# Patient Record
Sex: FEMALE | Race: BLACK OR AFRICAN AMERICAN | NOT HISPANIC OR LATINO | Employment: FULL TIME | URBAN - METROPOLITAN AREA
[De-identification: names, ages, dates, MRNs, and addresses within clinical notes are randomized per-mention and may not be internally consistent; named-entity substitution may affect disease eponyms.]

---

## 2017-07-21 ENCOUNTER — OFFICE VISIT (OUTPATIENT)
Dept: URGENT CARE | Facility: CLINIC | Age: 27
End: 2017-07-21
Payer: COMMERCIAL

## 2017-07-21 VITALS
WEIGHT: 140 LBS | TEMPERATURE: 101 F | BODY MASS INDEX: 21.97 KG/M2 | HEART RATE: 102 BPM | SYSTOLIC BLOOD PRESSURE: 134 MMHG | RESPIRATION RATE: 16 BRPM | HEIGHT: 67 IN | DIASTOLIC BLOOD PRESSURE: 98 MMHG

## 2017-07-21 DIAGNOSIS — K52.9 GASTROENTERITIS: Primary | ICD-10-CM

## 2017-07-21 LAB
CTP QC/QA: YES
FLUAV AG NPH QL: NEGATIVE
FLUBV AG NPH QL: NEGATIVE

## 2017-07-21 PROCEDURE — S0119 ONDANSETRON 4 MG: HCPCS | Mod: S$GLB,,, | Performed by: NURSE PRACTITIONER

## 2017-07-21 PROCEDURE — 87804 INFLUENZA ASSAY W/OPTIC: CPT | Mod: QW,S$GLB,, | Performed by: NURSE PRACTITIONER

## 2017-07-21 PROCEDURE — 99203 OFFICE O/P NEW LOW 30 MIN: CPT | Mod: 25,S$GLB,, | Performed by: NURSE PRACTITIONER

## 2017-07-21 RX ORDER — IBUPROFEN 200 MG
600 TABLET ORAL
Status: COMPLETED | OUTPATIENT
Start: 2017-07-21 | End: 2017-07-21

## 2017-07-21 RX ORDER — ONDANSETRON 4 MG/1
4 TABLET, ORALLY DISINTEGRATING ORAL
Status: COMPLETED | OUTPATIENT
Start: 2017-07-21 | End: 2017-07-21

## 2017-07-21 RX ORDER — DICYCLOMINE HYDROCHLORIDE 20 MG/1
20 TABLET ORAL 3 TIMES DAILY PRN
Qty: 12 TABLET | Refills: 0 | Status: SHIPPED | OUTPATIENT
Start: 2017-07-21 | End: 2018-05-24

## 2017-07-21 RX ORDER — NORGESTIMATE AND ETHINYL ESTRADIOL 0.25-0.035
1 KIT ORAL DAILY
COMMUNITY
End: 2018-05-24

## 2017-07-21 RX ORDER — ONDANSETRON 4 MG/1
4 TABLET, ORALLY DISINTEGRATING ORAL EVERY 6 HOURS PRN
Qty: 12 TABLET | Refills: 0 | Status: SHIPPED | OUTPATIENT
Start: 2017-07-21 | End: 2017-07-24

## 2017-07-21 RX ADMIN — Medication 600 MG: at 10:07

## 2017-07-21 RX ADMIN — ONDANSETRON 4 MG: 4 TABLET, ORALLY DISINTEGRATING ORAL at 10:07

## 2017-07-21 NOTE — PROGRESS NOTES
"Subjective:       Patient ID: Keara Higgins is a 26 y.o. female.    Vitals:  height is 5' 7" (1.702 m) and weight is 63.5 kg (140 lb). Her oral temperature is 100.8 °F (38.2 °C) (abnormal). Her blood pressure is 134/98 (abnormal) and her pulse is 102. Her respiration is 16.     Chief Complaint: Emesis; Abdominal Pain; and Diarrhea    Pt states all of the symptoms started yesteday, also feeling lightheaded like she may pass out. Pt also feels like her heart is beating really hard      Emesis    This is a new problem. The current episode started yesterday. The problem occurs intermittently. The problem has been gradually improving. The emesis has an appearance of bile. There has been no fever. The fever has been present for 1 to 2 days. Associated symptoms include chills and diarrhea. Pertinent negatives include no abdominal pain or chest pain.   Diarrhea    This is a new problem. The current episode started yesterday. Associated symptoms include chills and vomiting. Pertinent negatives include no abdominal pain. She has tried bismuth subsalicylate for the symptoms. The treatment provided no relief.     Review of Systems   Constitution: Positive for chills.   Cardiovascular: Negative for chest pain.   Musculoskeletal: Negative for back pain.   Gastrointestinal: Positive for diarrhea and vomiting. Negative for abdominal pain.   All other systems reviewed and are negative.      Objective:      Physical Exam   Constitutional: She is oriented to person, place, and time. She appears well-developed and well-nourished. She appears ill.   HENT:   Head: Normocephalic and atraumatic.   Right Ear: Hearing normal.   Left Ear: Hearing normal.   Nose: Nose normal.   Eyes: EOM are normal.   Neck: Normal range of motion. Neck supple.   Cardiovascular: Regular rhythm, S1 normal, S2 normal and normal heart sounds.  Tachycardia present.    Pulmonary/Chest: Effort normal and breath sounds normal. No respiratory distress.   Abdominal: " Soft. Normal appearance. Bowel sounds are increased.   Neurological: She is alert and oriented to person, place, and time.   Skin: Skin is warm and dry.       Assessment:       1. Gastroenteritis        Plan:     Patient declined UPT. Negative flu. She is feeling better after po zofran/ibuprofen in clinic. Able to tolerate fluids. No episodes of vomiting since arrival. Instructed to go to emergency room if symptoms do not improve/worsen or if she is unable to tolerate fluids. She verbalized understanding.    Gastroenteritis  -     POCT Influenza A/B    Other orders  -     ibuprofen tablet 600 mg; Take 3 tablets (600 mg total) by mouth one time.  -     ondansetron disintegrating tablet 4 mg; Take 1 tablet (4 mg total) by mouth one time.  -     ondansetron (ZOFRAN-ODT) 4 MG TbDL; Take 1 tablet (4 mg total) by mouth every 6 (six) hours as needed (nausea vomiting).  Dispense: 12 tablet; Refill: 0  -     dicyclomine (BENTYL) 20 mg tablet; Take 1 tablet (20 mg total) by mouth 3 (three) times daily as needed.  Dispense: 12 tablet; Refill: 0      Office Visit on 07/21/2017   Component Date Value Ref Range Status    Rapid Influenza A Ag 07/21/2017 Negative  Negative Final    Rapid Influenza B Ag 07/21/2017 Negative  Negative Final     Acceptable 07/21/2017 Yes   Final

## 2017-07-21 NOTE — PATIENT INSTRUCTIONS
If your symptoms worsen or do not improve you should go to the Emergency Room.  If you are unable to tolerate oral intake/fluids you should go to the Emergency room  Diet for Vomiting or Diarrhea (Adult)    Your symptoms may return or get worse after eating certain foods listed below. If this happens, stop eating these foods until your symptoms ease and you feel better.  Once the vomiting stops, follow the steps below.   During the first 12 to 24 hours  During the first 12 to 24 hours, follow this diet:  · Drinks. Plain water, sport drinks like electrolyte solutions, soft drinks without caffeine, mineral water (plain or flavored), clear fruit juices, and decaffeinated tea and coffee.  · Soups. Clear broth.  · Desserts. Plain gelatin, popsicles, and fruit juice bars. As you feel better, you may add 6 to 8 ounces of yogurt per day. If you have diarrhea, don't have foods or drinks that contain sugar, high-fructose corn syrup, or sugar alcohols.  During the next 24 hours  During the next 24 hours you may add the following to the above:  · Hot cereal, plain toast, bread, rolls, and crackers  · Plain noodles, rice, mashed potatoes, and chicken noodle or rice soup  · Unsweetened canned fruit (but not pineapple) and bananas  Don't eat more than 15 grams of fat a day. Do this by staying away from margarine, butter, oils, mayonnaise, sauces, gravies, fried foods, peanut butter, meat, poultry, and fish.  Don't eat much fiber. Stay away from raw or cooked vegetables, fresh fruits (except bananas), and bran cereals.  Limit how much caffeine and chocolate you have. Do not use any spices or seasonings except salt.  During the next 24 hours  Slowly go back to your normal diet, as you feel better and your symptoms ease.  Date Last Reviewed: 8/1/2016  © 5604-7423 [employer]. [address] [place] [address], [place] [address]. All rights reserved. This information is not intended as a substitute for professional medical care.  Always follow your healthcare professional's instructions.        Noninfectious Gastroenteritis (Ages 6 Years to Adult)    Gastroenteritis can cause nausea, vomiting, diarrhea, and abdominal cramping. This may occur as a result of food sensitivity, inflammation of your gastrointestinal tract, medicines, stress, or other causes not related to infection. Your symptoms will usually last from 1 to 3 days, but can last longer. Antibiotics are not effective, but simple home treatment will be helpful.  Home care  Medicine  · You may use acetaminophen or NSAID medicines like ibuprofen or naproxen to control fever, unless another medicine is prescribed. (Note: If you have chronic liver or kidney disease, or ever had a stomach ulcer or gastrointestinalI bleeding, talk with your healthcare provider before using these medicines.) Aspirin should never be used in anyone under 18 years of age who is ill with a fever. It may cause severe liver damage. Don't increase your NSAID medicines if you are already taking these medicines for another condition (like arthritis). Don't use NSAIDS if you are on aspirin (such as for heart disease, or after a stroke).  · If medicines for diarrhea or vomiting are prescribed, take only as directed.  General care and preventing spread of the illness  · If symptoms are severe, rest at home for the next 24 hours or until you feel better.  · Hand washing with soap and water is the best way to prevent the spread of infection. Wash your hands after touching anyone who is sick.  · Wash your hands after using the toilet and before meals. Clean the toilet after each use.  · Caffeine, tobacco, and alcohol can make your diarrhea, cramping, and pain worse.  Diet  · Water and clear liquids are important so you do not get dehydrated. Drink a small amount at a time.  · Do not force yourself to eat, especially if you have cramps, vomiting, or diarrhea. When you finally decide to start eating, do not eat large  amounts at a time, even if you are hungry.  · If you eat, avoid fatty, greasy, spicy, or fried foods.  · Do not eat dairy products if you have diarrhea; they can make the diarrhea worse.  During the first 24 hours (the first full day), follow the diet below:  · Beverages: Water, clear liquids, soft drinks without caffeine, like ginger ale; mineral water (plain or flavored); decaffeinated tea and coffee.  · Soups: Clear broth, consommé, and bouillon Sports drinks aren't a good choice because they have too much sugar and not enough electrolytes. In this case, commercially available products called oral rehydration solutions are best.  · Desserts: Plain gelatin, popsicles, and fruit juice bars.  During the next 24 hours (the second day), you may add the following to the above if you have improved. If not, continue what you did the first day:  · Hot cereal, plain toast, bread, rolls, crackers  · Plain noodles, rice, mashed potatoes, chicken noodle or rice soup  · Unsweetened canned fruit (avoid pineapple), bananas  · Limit caffeine and chocolate. No spices or seasonings except salt.  During the next 24 hours  · Gradually resume a normal diet, as you feel better and your symptoms improve.  · If at any time your symptoms start getting worse, go back to clear liquids until you feel better.  Food preparation  · If you have diarrhea, you should not prepare food for others. When you  prepare food for yourself, wash your hands before and after.  · Wash your hands after using cutting boards, countertops, and knives that have been in contact with raw food.  · Keep uncooked meats away from cooked and ready-to-eat foods.  Follow-up care  Follow up with your healthcare provider if you are not improving over the next 2 to 3 days, or as advised. If a stool (diarrhea) sample was taken, call for the results as directed.  When to seek medical care  Call your healthcare provider right away if any of these occur:   · Increasing abdominal  pain or constant lower right abdominal pain  · Continued vomiting (unable to keep liquids down)  · Frequent diarrhea (more than 5 times a day)  · Blood in vomit or stool (black or red color)  · Inability to tolerate solid food after a few days.  · Dark urine, reduced urine output  · Weakness, dizziness  · Drowsiness  · Fever of 100.4ºF (38.0ºC) or higher, or as directed by your healthcare provider  · New rash  Call 911  Call 911 if any of these occur:  · Trouble breathing  · Chest pain  · Confusion  · Severe drowsiness or trouble awakening  · Seizure  · Stiff neck  Date Last Reviewed: 11/16/2015  © 7240-0740 [employer]. [address] [place] [address], [place] [address]. All rights reserved. This information is not intended as a substitute for professional medical care. Always follow your healthcare professional's instructions.

## 2018-05-24 ENCOUNTER — LAB VISIT (OUTPATIENT)
Dept: LAB | Facility: OTHER | Age: 28
End: 2018-05-24
Payer: COMMERCIAL

## 2018-05-24 ENCOUNTER — OFFICE VISIT (OUTPATIENT)
Dept: OBSTETRICS AND GYNECOLOGY | Facility: CLINIC | Age: 28
End: 2018-05-24
Payer: COMMERCIAL

## 2018-05-24 VITALS
SYSTOLIC BLOOD PRESSURE: 112 MMHG | DIASTOLIC BLOOD PRESSURE: 78 MMHG | WEIGHT: 147.25 LBS | BODY MASS INDEX: 23.11 KG/M2 | HEIGHT: 67 IN

## 2018-05-24 DIAGNOSIS — N76.0 ACUTE VAGINITIS: ICD-10-CM

## 2018-05-24 DIAGNOSIS — Z11.3 SCREEN FOR STD (SEXUALLY TRANSMITTED DISEASE): ICD-10-CM

## 2018-05-24 DIAGNOSIS — Z12.4 ENCOUNTER FOR PAPANICOLAOU SMEAR FOR CERVICAL CANCER SCREENING: ICD-10-CM

## 2018-05-24 DIAGNOSIS — Z30.9 ENCOUNTER FOR CONTRACEPTIVE MANAGEMENT, UNSPECIFIED TYPE: ICD-10-CM

## 2018-05-24 DIAGNOSIS — Z01.419 WOMEN'S ANNUAL ROUTINE GYNECOLOGICAL EXAMINATION: Primary | ICD-10-CM

## 2018-05-24 PROCEDURE — 87510 GARDNER VAG DNA DIR PROBE: CPT

## 2018-05-24 PROCEDURE — 87491 CHLMYD TRACH DNA AMP PROBE: CPT

## 2018-05-24 PROCEDURE — 36415 COLL VENOUS BLD VENIPUNCTURE: CPT

## 2018-05-24 PROCEDURE — 87340 HEPATITIS B SURFACE AG IA: CPT

## 2018-05-24 PROCEDURE — 99999 PR PBB SHADOW E&M-EST. PATIENT-LVL III: CPT | Mod: PBBFAC,,, | Performed by: NURSE PRACTITIONER

## 2018-05-24 PROCEDURE — 99385 PREV VISIT NEW AGE 18-39: CPT | Mod: S$GLB,,, | Performed by: NURSE PRACTITIONER

## 2018-05-24 PROCEDURE — 88175 CYTOPATH C/V AUTO FLUID REDO: CPT

## 2018-05-24 PROCEDURE — 86592 SYPHILIS TEST NON-TREP QUAL: CPT

## 2018-05-24 PROCEDURE — 87480 CANDIDA DNA DIR PROBE: CPT

## 2018-05-24 PROCEDURE — 86703 HIV-1/HIV-2 1 RESULT ANTBDY: CPT

## 2018-05-24 RX ORDER — GUAIFENESIN 1200 MG
2 TABLET, EXTENDED RELEASE 12 HR ORAL
COMMUNITY
End: 2019-04-02

## 2018-05-24 RX ORDER — NORGESTIMATE AND ETHINYL ESTRADIOL 0.25-0.035
1 KIT ORAL DAILY
Qty: 90 TABLET | Refills: 3 | Status: SHIPPED | OUTPATIENT
Start: 2018-05-24 | End: 2019-04-12

## 2018-05-24 RX ORDER — TINIDAZOLE 500 MG/1
2 TABLET ORAL DAILY
Qty: 8 TABLET | Refills: 0 | Status: SHIPPED | OUTPATIENT
Start: 2018-05-24 | End: 2018-05-26

## 2018-05-24 NOTE — PROGRESS NOTES
CC: Annual  HPI: Pt is a 27 y.o.  female who presents for routine annual exam and to establish care.  She is from [place].  She uses OCPs for contraception. She does want STD screening- full panel.  Pt reports + vaginal irritation.  Reports has had recurrent irritation after intercourse. Reports she switched from latex condoms to lamb skin condoms and symptoms have continued to persist.  Reports maternal grandmother with some type of female cancer- she was  at diagnosis.       ROS:  GENERAL: Feeling well overall. Denies fever or chills.   SKIN: Denies rash or lesions.   HEAD: Denies head injury or headache.   NODES: Denies enlarged lymph nodes.   CHEST: Denies chest pain or shortness of breath.   CARDIOVASCULAR: Denies palpitations or left sided chest pain.   ABDOMEN: No abdominal pain, constipation, diarrhea, nausea, vomiting or rectal bleeding.   URINARY: No dysuria, hematuria, or burning on urination.  REPRODUCTIVE: See HPI.   BREASTS: Denies pain, lumps, or nipple discharge.   HEMATOLOGIC: No easy bruisability or excessive bleeding.   MUSCULOSKELETAL: Denies joint pain or swelling.   NEUROLOGIC: Denies syncope or weakness.   PSYCHIATRIC: Denies depression, anxiety or mood swings.    PE:   APPEARANCE: Well nourished, well developed, Black or  female in no acute distress.  NODES: no cervical, supraclavicular, or inguinal lymphadenopathy  BREASTS: Symmetrical, no skin changes or visible lesions. No palpable masses, nipple discharge or adenopathy bilaterally.  ABDOMEN: Soft. No tenderness or masses. No distention. No hernias palpated. No CVA tenderness.  VULVA: No lesions. Normal external female genitalia.  URETHRAL MEATUS: Normal size and location, no lesions, no prolapse.  URETHRA: No masses, tenderness, or prolapse.  VAGINA: Moist. No lesions or lacerations noted. + thin discharge present. No odor present.   CERVIX: No lesions or discharge. No cervical motion tenderness.   UTERUS: Normal  size, regular shape, mobile, non-tender.  ADNEXA: No tenderness. No fullness or masses palpated in the adnexal regions.   ANUS PERINEUM: Normal.      Diagnosis:  1. Women's annual routine gynecological examination    2. Encounter for Papanicolaou smear for cervical cancer screening    3. Encounter for contraceptive management, unspecified type    4. Screen for STD (sexually transmitted disease)    5. Acute vaginitis        Plan:   Pap  OCPs refilled  STD screening - full panel  Tindamax  Vaginitis Prevention:  a. avoiding feminine products such as deoderant soaps, body wash, bubble bath, douches, scented toilet paper, deoderant tampons or pads, feminine wipes, chronic pad use, etc.  b. avoiding other vulvovaginal irritants such as long hot baths, humidity, tight, synthetic clothing, chlorine and sitting around in wet bathing suits  c. wearing cotton underwear, avoiding thong underwear and no underwear to bed  d. taking showers instead of baths and use a hair dryer on cool setting afterwards to dry  e. wearing cotton to exercise and shower immediately after exercise and change clothes  f. using polyurethane condoms without spermicide if sexually active and symptoms are triggered by intercourse      Orders Placed This Encounter    C. trachomatis/N. gonorrhoeae by AMP DNA Cervix    Vaginosis Screen by DNA Probe    HIV-1 and HIV-2 antibodies    RPR    Hepatitis B surface antigen    norgestimate-ethinyl estradiol (ORTHO-CYCLEN) 0.25-35 mg-mcg per tablet    tinidazole (TINDAMAX) 500 MG tablet       Patient was counseled today on the new ACS guidelines for cervical cytology screening as well as the current recommendations for breast cancer screening. She was counseled to follow up with her PCP for other routine health maintenance. Counseling session lasted approximately 10 minutes, and all her questions were answered.    Follow-up with me in 1 year for routine exam    KEMI George

## 2018-05-25 LAB
CANDIDA RRNA VAG QL PROBE: NEGATIVE
G VAGINALIS RRNA GENITAL QL PROBE: NEGATIVE
HBV SURFACE AG SERPL QL IA: NEGATIVE
HIV 1+2 AB+HIV1 P24 AG SERPL QL IA: NEGATIVE
RPR SER QL: NORMAL
T VAGINALIS RRNA GENITAL QL PROBE: NEGATIVE

## 2018-05-26 LAB
C TRACH DNA SPEC QL NAA+PROBE: NOT DETECTED
N GONORRHOEA DNA SPEC QL NAA+PROBE: NOT DETECTED

## 2018-05-31 ENCOUNTER — TELEPHONE (OUTPATIENT)
Dept: OBSTETRICS AND GYNECOLOGY | Facility: CLINIC | Age: 28
End: 2018-05-31

## 2018-05-31 DIAGNOSIS — A60.00 GENITAL HERPES SIMPLEX, UNSPECIFIED SITE: Primary | ICD-10-CM

## 2018-05-31 RX ORDER — VALACYCLOVIR HYDROCHLORIDE 500 MG/1
500 TABLET, FILM COATED ORAL DAILY
Qty: 30 TABLET | Refills: 11 | Status: SHIPPED | OUTPATIENT
Start: 2018-05-31 | End: 2019-05-24

## 2018-05-31 NOTE — TELEPHONE ENCOUNTER
----- Message from Chandan Curran sent at 5/31/2018 10:50 AM CDT -----  Contact: pt            Name of Who is Calling: pt      What is the request in detail: is experiencing issues with the RX that was given to her.       Can the clinic reply by [facility]: no      [facility] [phone]

## 2018-05-31 NOTE — TELEPHONE ENCOUNTER
Spoke with patient and was told that after Tindamax, she has began to experience vaginal bumps, pain and bumps. Patient stated that this has happen in the past and is asking for a Rx of Valtrex.

## 2018-06-11 ENCOUNTER — OFFICE VISIT (OUTPATIENT)
Dept: URGENT CARE | Facility: CLINIC | Age: 28
End: 2018-06-11
Payer: COMMERCIAL

## 2018-06-11 VITALS
OXYGEN SATURATION: 99 % | TEMPERATURE: 98 F | SYSTOLIC BLOOD PRESSURE: 130 MMHG | HEART RATE: 70 BPM | HEIGHT: 67 IN | DIASTOLIC BLOOD PRESSURE: 93 MMHG | WEIGHT: 147 LBS | BODY MASS INDEX: 23.07 KG/M2 | RESPIRATION RATE: 16 BRPM

## 2018-06-11 DIAGNOSIS — J01.00 ACUTE MAXILLARY SINUSITIS, RECURRENCE NOT SPECIFIED: Primary | ICD-10-CM

## 2018-06-11 PROCEDURE — 96372 THER/PROPH/DIAG INJ SC/IM: CPT | Mod: S$GLB,,, | Performed by: NURSE PRACTITIONER

## 2018-06-11 PROCEDURE — 99214 OFFICE O/P EST MOD 30 MIN: CPT | Mod: 25,S$GLB,, | Performed by: NURSE PRACTITIONER

## 2018-06-11 RX ORDER — FLUTICASONE PROPIONATE 50 MCG
2 SPRAY, SUSPENSION (ML) NASAL DAILY
Qty: 1 BOTTLE | Refills: 0 | Status: SHIPPED | OUTPATIENT
Start: 2018-06-11 | End: 2019-04-02

## 2018-06-11 RX ORDER — DOXYCYCLINE 100 MG/1
100 CAPSULE ORAL 2 TIMES DAILY
Qty: 14 CAPSULE | Refills: 0 | Status: SHIPPED | OUTPATIENT
Start: 2018-06-11 | End: 2018-06-18

## 2018-06-11 RX ORDER — CODEINE PHOSPHATE AND GUAIFENESIN 10; 100 MG/5ML; MG/5ML
5 SOLUTION ORAL EVERY 6 HOURS PRN
Qty: 120 ML | Refills: 0 | Status: SHIPPED | OUTPATIENT
Start: 2018-06-11 | End: 2018-06-21

## 2018-06-11 RX ORDER — BETAMETHASONE SODIUM PHOSPHATE AND BETAMETHASONE ACETATE 3; 3 MG/ML; MG/ML
6 INJECTION, SUSPENSION INTRA-ARTICULAR; INTRALESIONAL; INTRAMUSCULAR; SOFT TISSUE
Status: COMPLETED | OUTPATIENT
Start: 2018-06-11 | End: 2018-06-11

## 2018-06-11 RX ADMIN — BETAMETHASONE SODIUM PHOSPHATE AND BETAMETHASONE ACETATE 6 MG: 3; 3 INJECTION, SUSPENSION INTRA-ARTICULAR; INTRALESIONAL; INTRAMUSCULAR; SOFT TISSUE at 06:06

## 2018-06-11 NOTE — PROGRESS NOTES
"Subjective:       Patient ID: Keara Higgins is a 27 y.o. female.    Vitals:  height is 5' 7" (1.702 m) and weight is 66.7 kg (147 lb). Her oral temperature is 98.4 °F (36.9 °C). Her blood pressure is 130/93 (abnormal) and her pulse is 70. Her respiration is 16 and oxygen saturation is 99%.     Chief Complaint: Cough    Patient presents with productive cough, nasal congestion, postnsasal drip, teeth pain, sinus pressure x 7 days.        Cough   This is a new problem. The current episode started 1 to 4 weeks ago. The problem has been unchanged. The problem occurs every few minutes. The cough is productive of sputum and productive of purulent sputum. Associated symptoms include chills, headaches, nasal congestion, postnasal drip and rhinorrhea. Pertinent negatives include no chest pain, ear congestion, ear pain, eye redness, fever, hemoptysis, myalgias, rash, sore throat, shortness of breath, sweats or wheezing. Nothing aggravates the symptoms. Treatments tried: delsym, nyquil, dayquil, tessakib pearls. The treatment provided no relief.     Review of Systems   Constitution: Positive for chills. Negative for fever and malaise/fatigue.   HENT: Positive for congestion, odynophagia, postnasal drip and rhinorrhea. Negative for ear pain, hoarse voice and sore throat.    Eyes: Negative for discharge and redness.   Cardiovascular: Negative for chest pain, dyspnea on exertion and leg swelling.   Respiratory: Positive for cough, sleep disturbances due to breathing and sputum production. Negative for hemoptysis, shortness of breath and wheezing.    Skin: Negative for rash.   Musculoskeletal: Negative for myalgias.   Gastrointestinal: Negative for abdominal pain and nausea.   Neurological: Positive for headaches.       Objective:      Physical Exam   Constitutional: She is oriented to person, place, and time. She appears well-developed and well-nourished. She is cooperative.  Non-toxic appearance. She does not appear ill. No " distress.   HENT:   Head: Normocephalic and atraumatic.   Right Ear: Hearing, tympanic membrane, external ear and ear canal normal.   Left Ear: Hearing, tympanic membrane, external ear and ear canal normal.   Nose: Mucosal edema and rhinorrhea present. No nasal deformity. No epistaxis. Right sinus exhibits maxillary sinus tenderness. Right sinus exhibits no frontal sinus tenderness. Left sinus exhibits maxillary sinus tenderness. Left sinus exhibits no frontal sinus tenderness.   Mouth/Throat: Uvula is midline and mucous membranes are normal. No oral lesions. No trismus in the jaw. Normal dentition. No dental abscesses, uvula swelling or dental caries. Posterior oropharyngeal edema (cobblestone with mild erythema and postnasal drip) and posterior oropharyngeal erythema present. No oropharyngeal exudate.   Eyes: Conjunctivae and lids are normal. No scleral icterus.   Sclera clear bilat   Neck: Trachea normal, full passive range of motion without pain and phonation normal. Neck supple.   Cardiovascular: Normal rate, regular rhythm, normal heart sounds and normal pulses.    Pulmonary/Chest: Effort normal and breath sounds normal. No respiratory distress. She has no wheezes.   Abdominal: Soft. Normal appearance and bowel sounds are normal. She exhibits no distension. There is no tenderness.   Musculoskeletal: Normal range of motion. She exhibits no edema or deformity.   Lymphadenopathy:     She has no cervical adenopathy.   Neurological: She is alert and oriented to person, place, and time. She exhibits normal muscle tone. Coordination normal.   Skin: Skin is warm, dry and intact. She is not diaphoretic. No pallor.   Psychiatric: She has a normal mood and affect. Her speech is normal and behavior is normal. Judgment and thought content normal. Cognition and memory are normal.   Nursing note and vitals reviewed.      Assessment:       1. Acute maxillary sinusitis, recurrence not specified        Plan:         Acute  maxillary sinusitis, recurrence not specified  -     fluticasone (FLONASE) 50 mcg/actuation nasal spray; 2 sprays (100 mcg total) by Each Nare route once daily.  Dispense: 1 Bottle; Refill: 0  -     doxycycline (VIBRAMYCIN) 100 MG Cap; Take 1 capsule (100 mg total) by mouth 2 (two) times daily.  Dispense: 14 capsule; Refill: 0  -     guaifenesin-codeine 100-10 mg/5 ml (TUSSI-ORGANIDIN NR)  mg/5 mL syrup; Take 5 mLs by mouth every 6 (six) hours as needed.  Dispense: 120 mL; Refill: 0  -     betamethasone acetate-betamethasone sodium phosphate injection 6 mg; Inject 1 mL (6 mg total) into the muscle one time.      Patient Instructions     Please drink plenty of fluids.  Please get plenty of rest.  Please return here or go to the Emergency Department for any concerns or worsening of condition.  If you were prescribed antibiotics, please take them to completion.  If you were prescribed a narcotic medication, do not drive or operate heavy equipment or machinery while taking these medications.  If you do not have Hypertension or any history of palpitations, it is ok to take over the counter Sudafed or Mucinex D or Allegra-D or Claritin-D or Zyrtec-D.  If you do take one of the above, it is ok to combine that with plain over the counter Mucinex or Allegra or Claritin or Zyrtec.  If for example you are taking Zyrtec -D, you can combine that with Mucinex, but not Mucinex-D.  If you are taking Mucinex-D, you can combine that with plain Allegra or Claritin or Zyrtec.   If you do have Hypertension or palpitations, it is safe to take Coricidin HBP for relief of sinus symptoms.  We recommend you take over the counter Flonase (Fluticasone) or another nasally inhaled steroid unless you are already taking one.  Nasal irrigation with a saline spray or Netti Pot like device per their directions is also recommended.  If not allergic, please take over the counter Tylenol (Acetaminophen) and/or Motrin (Ibuprofen) as directed for  control of pain and/or fever.  Please follow up with your primary care doctor or specialist as needed.    If you  smoke, please stop smoking.  Self-Care for Sinusitis     Drinking plenty of water can help sinuses drain.   Sinusitis can often be managed with self-care. Self-care can keep sinuses moist and make you feel more comfortable. Remember to follow your doctor's instructions closely. This can make a big difference in getting your sinus problem under control.  Drink fluids  Drinking extra fluids helps thin your mucus. This lets it drain from your sinuses more easily. Have a glass of water every hour or two. A humidifier helps in much the same way. Fluids can also offset the drying effects of certain medicines. If you use a humidifier, follow the product maker's instructions on how to use it. Clean it on a regular schedule.  Use saltwater rinses  Rinses help keep your sinuses and nose moist. Mix a teaspoon of salt in 8 ounces of fresh, warm water. Use a bulb syringe to gently squirt the water into your nose a few times a day. You can also buy ready-made saline nasal sprays.  Apply hot or cold packs  Applying heat to the area surrounding your sinuses may make you feel more comfortable. Use a hot water bottle or a hand towel dipped in hot water. Some people also find ice packs effective for relieving pain.  Medicines  Your doctor may prescribe medications to help treat your sinusitis. If you have an infection, antibiotics can help clear it up. If you are prescribed antibiotics, take all pills on schedule until they are gone, even if you feel better. Decongestants help relieve swelling. Use decongestant sprays for short periods only under the direction of your doctor. If you have allergies, your doctor may prescribe medications to help relieve them.   Date Last Reviewed: 10/1/2016  © 8538-3986 [employer]. [address] [place] [address], [place] [address]. All rights reserved. This information is not intended  as a substitute for professional medical care. Always follow your healthcare professional's instructions.        Sinusitis (Antibiotic Treatment)    The sinuses are air-filled spaces within the bones of the face. They connect to the inside of the nose. Sinusitis is an inflammation of the tissue lining the sinus cavity. Sinus inflammation can occur during a cold. It can also be due to allergies to pollens and other particles in the air. Sinusitis can cause symptoms of sinus congestion and fullness. A sinus infection causes fever, headache and facial pain. There is often green or yellow drainage from the nose or into the back of the throat (post-nasal drip). You have been given antibiotics to treat this condition.  Home care:  · Take the full course of antibiotics as instructed. Do not stop taking them, even if you feel better.  · Drink plenty of water, hot tea, and other liquids. This may help thin mucus. It also may promote sinus drainage.  · Heat may help soothe painful areas of the face. Use a towel soaked in hot water. Or,  the shower and direct the hot spray onto your face. Using a vaporizer along with a menthol rub at night may also help.   · An expectorant containing guaifenesin may help thin the mucus and promote drainage from the sinuses.  · Over-the-counter decongestants may be used unless a similar medicine was prescribed. Nasal sprays work the fastest. Use one that contains phenylephrine or oxymetazoline. First blow the nose gently. Then use the spray. Do not use these medicines more often than directed on the label or symptoms may get worse. You may also use tablets containing pseudoephedrine. Avoid products that combine ingredients, because side effects may be increased. Read labels. You can also ask the pharmacist for help. (NOTE: Persons with high blood pressure should not use decongestants. They can raise blood pressure.)  · Over-the-counter antihistamines may help if allergies contributed to  your sinusitis.    · Do not use nasal rinses or irrigation during an acute sinus infection, unless told to by your health care provider. Rinsing may spread the infection to other sinuses.  · Use acetaminophen or ibuprofen to control pain, unless another pain medicine was prescribed. (If you have chronic liver or kidney disease or ever had a stomach ulcer, talk with your doctor before using these medicines. Aspirin should never be used in anyone under 18 years of age who is ill with a fever. It may cause severe liver damage.)  · Don't smoke. This can worsen symptoms.  Follow-up care  Follow up with your healthcare provider or our staff if you are not improving within the next week.  When to seek medical advice  Call your healthcare provider if any of these occur:  · Facial pain or headache becoming more severe  · Stiff neck  · Unusual drowsiness or confusion  · Swelling of the forehead or eyelids  · Vision problems, including blurred or double vision  · Fever of 100.4ºF (38ºC) or higher, or as directed by your healthcare provider  · Seizure  · Breathing problems  · Symptoms not resolving within 10 days  Date Last Reviewed: 4/13/2015  © 6891-9208 [employer]. [address] [place] [address]. All rights reserved. This information is not intended as a substitute for professional medical care. Always follow your healthcare professional's instructions.

## 2018-06-11 NOTE — PATIENT INSTRUCTIONS
Please drink plenty of fluids.  Please get plenty of rest.  Please return here or go to the Emergency Department for any concerns or worsening of condition.  If you were prescribed antibiotics, please take them to completion.  If you were prescribed a narcotic medication, do not drive or operate heavy equipment or machinery while taking these medications.  If you do not have Hypertension or any history of palpitations, it is ok to take over the counter Sudafed or Mucinex D or Allegra-D or Claritin-D or Zyrtec-D.  If you do take one of the above, it is ok to combine that with plain over the counter Mucinex or Allegra or Claritin or Zyrtec.  If for example you are taking Zyrtec -D, you can combine that with Mucinex, but not Mucinex-D.  If you are taking Mucinex-D, you can combine that with plain Allegra or Claritin or Zyrtec.   If you do have Hypertension or palpitations, it is safe to take Coricidin HBP for relief of sinus symptoms.  We recommend you take over the counter Flonase (Fluticasone) or another nasally inhaled steroid unless you are already taking one.  Nasal irrigation with a saline spray or Netti Pot like device per their directions is also recommended.  If not allergic, please take over the counter Tylenol (Acetaminophen) and/or Motrin (Ibuprofen) as directed for control of pain and/or fever.  Please follow up with your primary care doctor or specialist as needed.    If you  smoke, please stop smoking.  Self-Care for Sinusitis     Drinking plenty of water can help sinuses drain.   Sinusitis can often be managed with self-care. Self-care can keep sinuses moist and make you feel more comfortable. Remember to follow your doctor's instructions closely. This can make a big difference in getting your sinus problem under control.  Drink fluids  Drinking extra fluids helps thin your mucus. This lets it drain from your sinuses more easily. Have a glass of water every hour or two. A humidifier helps in much the  same way. Fluids can also offset the drying effects of certain medicines. If you use a humidifier, follow the product maker's instructions on how to use it. Clean it on a regular schedule.  Use saltwater rinses  Rinses help keep your sinuses and nose moist. Mix a teaspoon of salt in 8 ounces of fresh, warm water. Use a bulb syringe to gently squirt the water into your nose a few times a day. You can also buy ready-made saline nasal sprays.  Apply hot or cold packs  Applying heat to the area surrounding your sinuses may make you feel more comfortable. Use a hot water bottle or a hand towel dipped in hot water. Some people also find ice packs effective for relieving pain.  Medicines  Your doctor may prescribe medications to help treat your sinusitis. If you have an infection, antibiotics can help clear it up. If you are prescribed antibiotics, take all pills on schedule until they are gone, even if you feel better. Decongestants help relieve swelling. Use decongestant sprays for short periods only under the direction of your doctor. If you have allergies, your doctor may prescribe medications to help relieve them.   Date Last Reviewed: 10/1/2016  © 6264-9799 [employer]. [address] [place] [address], [place] [address]. All rights reserved. This information is not intended as a substitute for professional medical care. Always follow your healthcare professional's instructions.        Sinusitis (Antibiotic Treatment)    The sinuses are air-filled spaces within the bones of the face. They connect to the inside of the nose. Sinusitis is an inflammation of the tissue lining the sinus cavity. Sinus inflammation can occur during a cold. It can also be due to allergies to pollens and other particles in the air. Sinusitis can cause symptoms of sinus congestion and fullness. A sinus infection causes fever, headache and facial pain. There is often green or yellow drainage from the nose or into the back of the throat  (post-nasal drip). You have been given antibiotics to treat this condition.  Home care:  · Take the full course of antibiotics as instructed. Do not stop taking them, even if you feel better.  · Drink plenty of water, hot tea, and other liquids. This may help thin mucus. It also may promote sinus drainage.  · Heat may help soothe painful areas of the face. Use a towel soaked in hot water. Or,  the shower and direct the hot spray onto your face. Using a vaporizer along with a menthol rub at night may also help.   · An expectorant containing guaifenesin may help thin the mucus and promote drainage from the sinuses.  · Over-the-counter decongestants may be used unless a similar medicine was prescribed. Nasal sprays work the fastest. Use one that contains phenylephrine or oxymetazoline. First blow the nose gently. Then use the spray. Do not use these medicines more often than directed on the label or symptoms may get worse. You may also use tablets containing pseudoephedrine. Avoid products that combine ingredients, because side effects may be increased. Read labels. You can also ask the pharmacist for help. (NOTE: Persons with high blood pressure should not use decongestants. They can raise blood pressure.)  · Over-the-counter antihistamines may help if allergies contributed to your sinusitis.    · Do not use nasal rinses or irrigation during an acute sinus infection, unless told to by your health care provider. Rinsing may spread the infection to other sinuses.  · Use acetaminophen or ibuprofen to control pain, unless another pain medicine was prescribed. (If you have chronic liver or kidney disease or ever had a stomach ulcer, talk with your doctor before using these medicines. Aspirin should never be used in anyone under 18 years of age who is ill with a fever. It may cause severe liver damage.)  · Don't smoke. This can worsen symptoms.  Follow-up care  Follow up with your healthcare provider or our staff if  you are not improving within the next week.  When to seek medical advice  Call your healthcare provider if any of these occur:  · Facial pain or headache becoming more severe  · Stiff neck  · Unusual drowsiness or confusion  · Swelling of the forehead or eyelids  · Vision problems, including blurred or double vision  · Fever of 100.4ºF (38ºC) or higher, or as directed by your healthcare provider  · Seizure  · Breathing problems  · Symptoms not resolving within 10 days  Date Last Reviewed: 4/13/2015 © 2000-2017 [employer]. [address] [place] [address], [place] [address]. All rights reserved. This information is not intended as a substitute for professional medical care. Always follow your healthcare professional's instructions.

## 2018-11-15 ENCOUNTER — OFFICE VISIT (OUTPATIENT)
Dept: URGENT CARE | Facility: CLINIC | Age: 28
End: 2018-11-15
Payer: COMMERCIAL

## 2018-11-15 VITALS
DIASTOLIC BLOOD PRESSURE: 88 MMHG | WEIGHT: 145 LBS | TEMPERATURE: 99 F | RESPIRATION RATE: 20 BRPM | OXYGEN SATURATION: 100 % | BODY MASS INDEX: 21.98 KG/M2 | HEART RATE: 90 BPM | SYSTOLIC BLOOD PRESSURE: 126 MMHG | HEIGHT: 68 IN

## 2018-11-15 DIAGNOSIS — J01.00 ACUTE NON-RECURRENT MAXILLARY SINUSITIS: Primary | ICD-10-CM

## 2018-11-15 DIAGNOSIS — R05.9 COUGH: ICD-10-CM

## 2018-11-15 PROCEDURE — 99214 OFFICE O/P EST MOD 30 MIN: CPT | Mod: 25,S$GLB,, | Performed by: PHYSICIAN ASSISTANT

## 2018-11-15 PROCEDURE — 96372 THER/PROPH/DIAG INJ SC/IM: CPT | Mod: S$GLB,,, | Performed by: PHYSICIAN ASSISTANT

## 2018-11-15 PROCEDURE — 3008F BODY MASS INDEX DOCD: CPT | Mod: CPTII,S$GLB,, | Performed by: PHYSICIAN ASSISTANT

## 2018-11-15 RX ORDER — AZITHROMYCIN 250 MG/1
TABLET, FILM COATED ORAL
Qty: 6 TABLET | Refills: 0 | Status: SHIPPED | OUTPATIENT
Start: 2018-11-15 | End: 2019-04-02

## 2018-11-15 RX ORDER — CODEINE PHOSPHATE AND GUAIFENESIN 10; 100 MG/5ML; MG/5ML
5 SOLUTION ORAL 3 TIMES DAILY PRN
Qty: 120 ML | Refills: 0 | Status: SHIPPED | OUTPATIENT
Start: 2018-11-15 | End: 2018-11-25

## 2018-11-15 RX ORDER — BETAMETHASONE SODIUM PHOSPHATE AND BETAMETHASONE ACETATE 3; 3 MG/ML; MG/ML
6 INJECTION, SUSPENSION INTRA-ARTICULAR; INTRALESIONAL; INTRAMUSCULAR; SOFT TISSUE
Status: COMPLETED | OUTPATIENT
Start: 2018-11-15 | End: 2018-11-15

## 2018-11-15 RX ADMIN — BETAMETHASONE SODIUM PHOSPHATE AND BETAMETHASONE ACETATE 6 MG: 3; 3 INJECTION, SUSPENSION INTRA-ARTICULAR; INTRALESIONAL; INTRAMUSCULAR; SOFT TISSUE at 10:11

## 2018-11-15 NOTE — LETTER
November 15, 2018      [facility]  [place] [address]  Phone: [phone]  Fax: [phone]       Patient: Keara Higgins   YOB: 1990  Date of Visit: 11/15/2018    To Whom It May Concern:    Chantal Higgins  was at [facility] on 11/15/2018. She may return to work/school on 11/16/18 with no restrictions. If you have any questions or concerns, or if I can be of further assistance, please do not hesitate to contact me.    Sincerely,    Paola Bustillo PA-C

## 2018-11-15 NOTE — PATIENT INSTRUCTIONS
Sinusitis (Antibiotic Treatment)    The sinuses are air-filled spaces within the bones of the face. They connect to the inside of the nose. Sinusitis is an inflammation of the tissue lining the sinus cavity. Sinus inflammation can occur during a cold. It can also be due to allergies to pollens and other particles in the air. Sinusitis can cause symptoms of sinus congestion and fullness. A sinus infection causes fever, headache and facial pain. There is often green or yellow drainage from the nose or into the back of the throat (post-nasal drip). You have been given antibiotics to treat this condition.  Home care:  · Take the full course of antibiotics as instructed. Do not stop taking them, even if you feel better.  · Drink plenty of water, hot tea, and other liquids. This may help thin mucus. It also may promote sinus drainage.  · Heat may help soothe painful areas of the face. Use a towel soaked in hot water. Or,  the shower and direct the hot spray onto your face. Using a vaporizer along with a menthol rub at night may also help.   · An expectorant containing guaifenesin may help thin the mucus and promote drainage from the sinuses.  · Over-the-counter decongestants may be used unless a similar medicine was prescribed. Nasal sprays work the fastest. Use one that contains phenylephrine or oxymetazoline. First blow the nose gently. Then use the spray. Do not use these medicines more often than directed on the label or symptoms may get worse. You may also use tablets containing pseudoephedrine. Avoid products that combine ingredients, because side effects may be increased. Read labels. You can also ask the pharmacist for help. (NOTE: Persons with high blood pressure should not use decongestants. They can raise blood pressure.)  · Over-the-counter antihistamines may help if allergies contributed to your sinusitis.    · Do not use nasal rinses or irrigation during an acute sinus infection, unless told to by  your health care provider. Rinsing may spread the infection to other sinuses.  · Use acetaminophen or ibuprofen to control pain, unless another pain medicine was prescribed. (If you have chronic liver or kidney disease or ever had a stomach ulcer, talk with your doctor before using these medicines. Aspirin should never be used in anyone under 18 years of age who is ill with a fever. It may cause severe liver damage.)  · Don't smoke. This can worsen symptoms.  Follow-up care  Follow up with your healthcare provider or our staff if you are not improving within the next week.  When to seek medical advice  Call your healthcare provider if any of these occur:  · Facial pain or headache becoming more severe  · Stiff neck  · Unusual drowsiness or confusion  · Swelling of the forehead or eyelids  · Vision problems, including blurred or double vision  · Fever of 100.4ºF (38ºC) or higher, or as directed by your healthcare provider  · Seizure  · Breathing problems  · Symptoms not resolving within 10 days  Date Last Reviewed: 4/13/2015  © 1590-8265 [employer]. [address] [place] [address]. All rights reserved. This information is not intended as a substitute for professional medical care. Always follow your healthcare professional's instructions.      Please follow up with your Primary care provider within 2-5 days if your signs and symptoms have not resolved or worsen.     If your condition worsens or fails to improve we recommend that you receive another evaluation at the emergency room immediately or contact your primary medical clinic to discuss your concerns.   You must understand that you have received an Urgent Care treatment only and that you may be released before all of your medical problems are known or treated. You, the patient, will arrange for follow up care as instructed.     RED FLAGS/WARNING SYMPTOMS DISCUSSED WITH PATIENT THAT WOULD WARRANT EMERGENT MEDICAL ATTENTION. PATIENT  VERBALIZED UNDERSTANDING.

## 2018-11-15 NOTE — PROGRESS NOTES
"Subjective:       Patient ID: Keara Higgins is a 27 y.o. female.    Vitals:  height is 5' 8" (1.727 m) and weight is 65.8 kg (145 lb). Her temperature is 99.3 °F (37.4 °C). Her blood pressure is 126/88 and her pulse is 90. Her respiration is 20 and oxygen saturation is 100%.     Chief Complaint: Cough    Cough   This is a new problem. The current episode started in the past 7 days. The problem has been unchanged. The cough is non-productive. Pertinent negatives include no chills, ear pain, eye redness, fever, headaches, hemoptysis, myalgias, rash, sore throat, shortness of breath or wheezing. Nothing aggravates the symptoms.   Sinus Problem   This is a new problem. The current episode started in the past 7 days. The problem has been gradually worsening since onset. There has been no fever. Her pain is at a severity of 6/10. The pain is moderate. Associated symptoms include congestion, coughing and sinus pressure. Pertinent negatives include no chills, diaphoresis, ear pain, headaches, hoarse voice, neck pain, shortness of breath or sore throat. Past treatments include nothing.       Constitution: Negative for chills, sweating, fatigue and fever.   HENT: Positive for congestion, sinus pain and sinus pressure. Negative for ear pain, sore throat and voice change.    Neck: Negative for neck pain and painful lymph nodes.   Eyes: Negative for eye redness.   Respiratory: Positive for cough. Negative for chest tightness, sputum production, bloody sputum, COPD, shortness of breath, stridor, wheezing and asthma.    Gastrointestinal: Negative for nausea and vomiting.   Musculoskeletal: Negative for muscle ache.   Skin: Negative for rash.   Allergic/Immunologic: Negative for seasonal allergies and asthma.   Neurological: Negative for headaches.   Hematologic/Lymphatic: Negative for swollen lymph nodes.       Objective:      Physical Exam   Constitutional: She is oriented to person, place, and time. She appears well-developed " and well-nourished. She is cooperative.  Non-toxic appearance. She does not appear ill. No distress.   HENT:   Head: Normocephalic and atraumatic.   Right Ear: Hearing, external ear and ear canal normal. Tympanic membrane is injected. A middle ear effusion is present.   Left Ear: Hearing, external ear and ear canal normal. Tympanic membrane is injected. A middle ear effusion is present.   Nose: Mucosal edema, rhinorrhea and sinus tenderness present. No nasal deformity. No epistaxis. Right sinus exhibits maxillary sinus tenderness. Right sinus exhibits no frontal sinus tenderness. Left sinus exhibits maxillary sinus tenderness. Left sinus exhibits no frontal sinus tenderness.   Mouth/Throat: Uvula is midline and mucous membranes are normal. No trismus in the jaw. Normal dentition. No uvula swelling. Posterior oropharyngeal erythema present. Tonsils are 1+ on the right. Tonsils are 1+ on the left. No tonsillar exudate.   Eyes: Conjunctivae and lids are normal. No scleral icterus.   Sclera clear bilat   Neck: Trachea normal, full passive range of motion without pain and phonation normal. Neck supple.   Cardiovascular: Normal rate, regular rhythm, normal heart sounds, intact distal pulses and normal pulses.   Pulmonary/Chest: Effort normal and breath sounds normal. No stridor. No respiratory distress. She has no decreased breath sounds. She has no wheezes. She has no rhonchi. She has no rales.   Abdominal: Soft. Normal appearance and bowel sounds are normal. She exhibits no distension. There is no tenderness.   Musculoskeletal: Normal range of motion. She exhibits no edema or deformity.   Neurological: She is alert and oriented to person, place, and time. She exhibits normal muscle tone. Coordination normal.   Skin: Skin is warm, dry and intact. She is not diaphoretic. No pallor.   Psychiatric: She has a normal mood and affect. Her speech is normal and behavior is normal. Judgment and thought content normal. Cognition  and memory are normal.   Nursing note and vitals reviewed.      Assessment:       1. Acute non-recurrent maxillary sinusitis    2. Cough        Plan:         Acute non-recurrent maxillary sinusitis  -     betamethasone acetate-betamethasone sodium phosphate injection 6 mg  -     azithromycin (ZITHROMAX Z-SUAD) 250 MG tablet; Take 2 tablets (500 mg) on  Day 1,  followed by 1 tablet (250 mg) once daily on Days 2 through 5.  Dispense: 6 tablet; Refill: 0  -     guaifenesin-codeine 100-10 mg/5 ml (TUSSI-ORGANIDIN NR)  mg/5 mL syrup; Take 5 mLs by mouth 3 (three) times daily as needed for Cough.  Dispense: 120 mL; Refill: 0    Cough          Sinusitis (Antibiotic Treatment)    The sinuses are air-filled spaces within the bones of the face. They connect to the inside of the nose. Sinusitis is an inflammation of the tissue lining the sinus cavity. Sinus inflammation can occur during a cold. It can also be due to allergies to pollens and other particles in the air. Sinusitis can cause symptoms of sinus congestion and fullness. A sinus infection causes fever, headache and facial pain. There is often green or yellow drainage from the nose or into the back of the throat (post-nasal drip). You have been given antibiotics to treat this condition.  Home care:  · Take the full course of antibiotics as instructed. Do not stop taking them, even if you feel better.  · Drink plenty of water, hot tea, and other liquids. This may help thin mucus. It also may promote sinus drainage.  · Heat may help soothe painful areas of the face. Use a towel soaked in hot water. Or,  the shower and direct the hot spray onto your face. Using a vaporizer along with a menthol rub at night may also help.   · An expectorant containing guaifenesin may help thin the mucus and promote drainage from the sinuses.  · Over-the-counter decongestants may be used unless a similar medicine was prescribed. Nasal sprays work the fastest. Use one that  contains phenylephrine or oxymetazoline. First blow the nose gently. Then use the spray. Do not use these medicines more often than directed on the label or symptoms may get worse. You may also use tablets containing pseudoephedrine. Avoid products that combine ingredients, because side effects may be increased. Read labels. You can also ask the pharmacist for help. (NOTE: Persons with high blood pressure should not use decongestants. They can raise blood pressure.)  · Over-the-counter antihistamines may help if allergies contributed to your sinusitis.    · Do not use nasal rinses or irrigation during an acute sinus infection, unless told to by your health care provider. Rinsing may spread the infection to other sinuses.  · Use acetaminophen or ibuprofen to control pain, unless another pain medicine was prescribed. (If you have chronic liver or kidney disease or ever had a stomach ulcer, talk with your doctor before using these medicines. Aspirin should never be used in anyone under 18 years of age who is ill with a fever. It may cause severe liver damage.)  · Don't smoke. This can worsen symptoms.  Follow-up care  Follow up with your healthcare provider or our staff if you are not improving within the next week.  When to seek medical advice  Call your healthcare provider if any of these occur:  · Facial pain or headache becoming more severe  · Stiff neck  · Unusual drowsiness or confusion  · Swelling of the forehead or eyelids  · Vision problems, including blurred or double vision  · Fever of 100.4ºF (38ºC) or higher, or as directed by your healthcare provider  · Seizure  · Breathing problems  · Symptoms not resolving within 10 days  Date Last Reviewed: 4/13/2015  © 6524-9726 [employer]. [address] [place] [address], [place] [address]. All rights reserved. This information is not intended as a substitute for professional medical care. Always follow your healthcare professional's instructions.      Please  follow up with your Primary care provider within 2-5 days if your signs and symptoms have not resolved or worsen.     If your condition worsens or fails to improve we recommend that you receive another evaluation at the emergency room immediately or contact your primary medical clinic to discuss your concerns.   You must understand that you have received an Urgent Care treatment only and that you may be released before all of your medical problems are known or treated. You, the patient, will arrange for follow up care as instructed.     RED FLAGS/WARNING SYMPTOMS DISCUSSED WITH PATIENT THAT WOULD WARRANT EMERGENT MEDICAL ATTENTION. PATIENT VERBALIZED UNDERSTANDING.

## 2019-04-01 NOTE — PROGRESS NOTES
Subjective:      Patient ID: Keara Higgins is a 28 y.o. female.    Chief Complaint: Low-back Pain      HPI  ([place])    She is otherwise healthy.     She has constant left sided LBP with left posterior/anterior leg pain to knee and posterior leg pain to her foot x 2 weeks, but she's had chronic intermittent pain x 10 years. Flare ups generally resolve in day or so and have never been this bad. No right sided back or leg pain. LBP > left leg pain. No alleviating factors. Pain is worse with prolonged sitting, stretching. Minimal improvement with standing with weight on right leg. She has tingling in her foot. No numbness or weakness. Pain varies and can be sharp, shooting, stabbing, and aching. No known injury to her back. She rates her pain as a 6 on a scale of 1-10.     She has tried OTC motrin/aleve with minimal relief. No PT, injections, surgery on her back.       Review of Systems   Constitution: Negative for fever, malaise/fatigue, night sweats, weight gain and weight loss.   HENT: Negative for hearing loss, nosebleeds and odynophagia.    Eyes: Negative for blurred vision and double vision.   Cardiovascular: Negative for chest pain, irregular heartbeat and palpitations.   Respiratory: Negative for cough, hemoptysis, shortness of breath and wheezing.    Endocrine: Negative for cold intolerance and polydipsia.   Hematologic/Lymphatic: Does not bruise/bleed easily.   Skin: Negative for dry skin, poor wound healing, rash and suspicious lesions.   Musculoskeletal: Positive for back pain and neck pain.        See HPI for pertinent positives.   Gastrointestinal: Negative for bloating, abdominal pain, constipation, diarrhea, hematochezia, melena, nausea and vomiting.   Genitourinary: Negative for bladder incontinence, dysuria, hematuria, hesitancy and incomplete emptying.   Neurological: Positive for paresthesias. Negative for disturbances in coordination, dizziness, focal weakness, headaches, loss of balance,  numbness, seizures and weakness.   Psychiatric/Behavioral: Negative for depression and hallucinations. The patient is not nervous/anxious.            Objective:        General: Keara is well-developed, well-nourished, appears stated age, in no acute distress, alert and oriented to time, place and person.     General    Vitals reviewed.  Constitutional: She is oriented to person, place, and time. She appears well-developed and well-nourished.   Pulmonary/Chest: Effort normal.   Abdominal: She exhibits no distension.   Neurological: She is alert and oriented to person, place, and time.   Psychiatric: She has a normal mood and affect. Her behavior is normal. Judgment and thought content normal.           Gait: normal, tandem walking is normal and she can heel/toe stand.     On exam of the lumbar spine, Inspection of back is normal, Mild left sided lower lumbar tenderness.     Skin in lumbar region is warm to the touch without visible rashes.     muscle tone normal without spasm, limited range of motion with pain  Pain in flexion. and Pain in extension.    Strength testing of the bilateral LEs shows  Right hip abduction:  +5/5  Left hip abduction:  +5/5  Right hip flexion:  +5/5  Left hip flexion:  +5/5  Right hip extensors:  +5/5  Left hip extensors:  +5/5  Right quadriceps:  +5/5  Left quadriceps:  +5/5  Right hamstring:  +5/5  Left hamstring:  +5/5  Right dorsiflexion:  +5/5  Left dorsiflexion:  +5/5  Right plantar flexion:  +5/5  Left plantar flexion:  +5/5   Right EHL:  +5/5   Left EHL:  +5/5    negative clonus of bilateral LEs.     negative straight leg raise on bilateral LEs.     DTRs:  Right patellar:  +2     Left patellar:  +2  Right achilles:  +2   Left achilles:  +2    Sensation is grossly intact in L2, L3, L4, L5, and S1 distribution.    Right hip has no pain with IR/ER. Left hip has no pain with IR/ER.      On exam of bilateral UEs, patient has full painfree ROM with no signs of clubbing, laxity,  cyanosis, edema, instability, weakness, or tenderness.         Assessment:       1. Chronic left-sided low back pain with left-sided sciatica    2. Thoracic and lumbosacral neuritis           Plan:       Orders Placed This Encounter    X-Ray Lumbar Spine Ap Lateral w/Flex Ext    Ambulatory referral to Physical Therapy - Lumbar    methylPREDNISolone (MEDROL DOSEPACK) 4 mg tablet    tiZANidine (ZANAFLEX) 4 MG tablet       10 year history of chronic intermittent back pain with 2 weeks of constant left sided LBP with left posterior/anterior leg pain to knee and posterior leg pain to her foot. Flare ups generally resolve in day or so and have never been this bad. No right sided back or leg pain. LBP > left leg pain. No imaging available. Likely with component of myofascial pain. Treatment options reviewed with patient and following plan made:     - Lumbar XRs on her way out. Will put results on [facility].   - Medrol dose pack for symptom relief. Reviewed dosing and side effects.   - New prescription for zanaflex to use prn muscle spasms. Reviewed dosing and side effects.   - PT for lumbar spine with good HEP. External orders given.   - Will email her next week to check on her progress.     Follow-up: Follow up if symptoms worsen or fail to improve. If there are any questions prior to this, the patient was instructed to contact the office.

## 2019-04-02 ENCOUNTER — OFFICE VISIT (OUTPATIENT)
Dept: SPINE | Facility: CLINIC | Age: 29
End: 2019-04-02
Payer: COMMERCIAL

## 2019-04-02 ENCOUNTER — APPOINTMENT (OUTPATIENT)
Dept: RADIOLOGY | Facility: OTHER | Age: 29
End: 2019-04-02
Attending: PHYSICIAN ASSISTANT
Payer: COMMERCIAL

## 2019-04-02 VITALS
TEMPERATURE: 99 F | DIASTOLIC BLOOD PRESSURE: 89 MMHG | BODY MASS INDEX: 21.98 KG/M2 | HEART RATE: 70 BPM | WEIGHT: 145 LBS | HEIGHT: 68 IN | SYSTOLIC BLOOD PRESSURE: 145 MMHG

## 2019-04-02 DIAGNOSIS — M54.14 THORACIC AND LUMBOSACRAL NEURITIS: ICD-10-CM

## 2019-04-02 DIAGNOSIS — G89.29 CHRONIC LEFT-SIDED LOW BACK PAIN WITH LEFT-SIDED SCIATICA: Primary | ICD-10-CM

## 2019-04-02 DIAGNOSIS — M54.42 CHRONIC LEFT-SIDED LOW BACK PAIN WITH LEFT-SIDED SCIATICA: Primary | ICD-10-CM

## 2019-04-02 DIAGNOSIS — M54.42 CHRONIC LEFT-SIDED LOW BACK PAIN WITH LEFT-SIDED SCIATICA: ICD-10-CM

## 2019-04-02 DIAGNOSIS — G89.29 CHRONIC LEFT-SIDED LOW BACK PAIN WITH LEFT-SIDED SCIATICA: ICD-10-CM

## 2019-04-02 DIAGNOSIS — M54.17 THORACIC AND LUMBOSACRAL NEURITIS: ICD-10-CM

## 2019-04-02 PROCEDURE — 99204 PR OFFICE/OUTPT VISIT, NEW, LEVL IV, 45-59 MIN: ICD-10-PCS | Mod: S$GLB,,, | Performed by: PHYSICIAN ASSISTANT

## 2019-04-02 PROCEDURE — 3008F PR BODY MASS INDEX (BMI) DOCUMENTED: ICD-10-PCS | Mod: CPTII,S$GLB,, | Performed by: PHYSICIAN ASSISTANT

## 2019-04-02 PROCEDURE — 72100 XR LUMBAR SPINE AP AND LAT WITH FLEX/EXT: ICD-10-PCS | Mod: 26,,, | Performed by: RADIOLOGY

## 2019-04-02 PROCEDURE — 99204 OFFICE O/P NEW MOD 45 MIN: CPT | Mod: S$GLB,,, | Performed by: PHYSICIAN ASSISTANT

## 2019-04-02 PROCEDURE — 72100 X-RAY EXAM L-S SPINE 2/3 VWS: CPT | Mod: 26,,, | Performed by: RADIOLOGY

## 2019-04-02 PROCEDURE — 72120 XR LUMBAR SPINE AP AND LAT WITH FLEX/EXT: ICD-10-PCS | Mod: 26,,, | Performed by: RADIOLOGY

## 2019-04-02 PROCEDURE — 72100 X-RAY EXAM L-S SPINE 2/3 VWS: CPT | Mod: TC

## 2019-04-02 PROCEDURE — 99999 PR PBB SHADOW E&M-EST. PATIENT-LVL IV: ICD-10-PCS | Mod: PBBFAC,,, | Performed by: PHYSICIAN ASSISTANT

## 2019-04-02 PROCEDURE — 3008F BODY MASS INDEX DOCD: CPT | Mod: CPTII,S$GLB,, | Performed by: PHYSICIAN ASSISTANT

## 2019-04-02 PROCEDURE — 99999 PR PBB SHADOW E&M-EST. PATIENT-LVL IV: CPT | Mod: PBBFAC,,, | Performed by: PHYSICIAN ASSISTANT

## 2019-04-02 PROCEDURE — 72120 X-RAY BEND ONLY L-S SPINE: CPT | Mod: 26,,, | Performed by: RADIOLOGY

## 2019-04-02 RX ORDER — TIZANIDINE 4 MG/1
4 TABLET ORAL 3 TIMES DAILY PRN
Qty: 90 TABLET | Refills: 0 | Status: SHIPPED | OUTPATIENT
Start: 2019-04-02 | End: 2019-05-02

## 2019-04-02 RX ORDER — METHYLPREDNISOLONE 4 MG/1
TABLET ORAL
Qty: 1 PACKAGE | Refills: 0 | Status: SHIPPED | OUTPATIENT
Start: 2019-04-02 | End: 2019-05-24

## 2019-04-10 ENCOUNTER — PATIENT MESSAGE (OUTPATIENT)
Dept: SPINE | Facility: CLINIC | Age: 29
End: 2019-04-10

## 2019-04-12 ENCOUNTER — OFFICE VISIT (OUTPATIENT)
Dept: INTERNAL MEDICINE | Facility: CLINIC | Age: 29
End: 2019-04-12
Payer: COMMERCIAL

## 2019-04-12 ENCOUNTER — HOSPITAL ENCOUNTER (OUTPATIENT)
Dept: RADIOLOGY | Facility: HOSPITAL | Age: 29
Discharge: HOME | End: 2019-04-12
Attending: FAMILY MEDICINE
Payer: COMMERCIAL

## 2019-04-12 ENCOUNTER — TELEPHONE (OUTPATIENT)
Dept: OBSTETRICS AND GYNECOLOGY | Facility: CLINIC | Age: 29
End: 2019-04-12

## 2019-04-12 VITALS
HEART RATE: 84 BPM | HEIGHT: 68 IN | TEMPERATURE: 99 F | SYSTOLIC BLOOD PRESSURE: 100 MMHG | DIASTOLIC BLOOD PRESSURE: 70 MMHG | WEIGHT: 159.38 LBS | BODY MASS INDEX: 24.15 KG/M2 | OXYGEN SATURATION: 99 %

## 2019-04-12 DIAGNOSIS — R06.02 SHORTNESS OF BREATH: ICD-10-CM

## 2019-04-12 DIAGNOSIS — R05.3 CHRONIC COUGH: ICD-10-CM

## 2019-04-12 PROBLEM — L30.9 ECZEMA: Status: ACTIVE | Noted: 2019-04-12

## 2019-04-12 PROCEDURE — 71046 XR CHEST PA AND LATERAL: ICD-10-PCS | Mod: 26,,, | Performed by: RADIOLOGY

## 2019-04-12 PROCEDURE — 99214 PR OFFICE/OUTPT VISIT, EST, LEVL IV, 30-39 MIN: ICD-10-PCS | Mod: S$GLB,,, | Performed by: FAMILY MEDICINE

## 2019-04-12 PROCEDURE — 71046 X-RAY EXAM CHEST 2 VIEWS: CPT | Mod: 26,,, | Performed by: RADIOLOGY

## 2019-04-12 PROCEDURE — 99999 PR PBB SHADOW E&M-EST. PATIENT-LVL III: ICD-10-PCS | Mod: PBBFAC,,, | Performed by: FAMILY MEDICINE

## 2019-04-12 PROCEDURE — 71046 X-RAY EXAM CHEST 2 VIEWS: CPT | Mod: TC

## 2019-04-12 PROCEDURE — 3008F BODY MASS INDEX DOCD: CPT | Mod: CPTII,S$GLB,, | Performed by: FAMILY MEDICINE

## 2019-04-12 PROCEDURE — 99214 OFFICE O/P EST MOD 30 MIN: CPT | Mod: S$GLB,,, | Performed by: FAMILY MEDICINE

## 2019-04-12 PROCEDURE — 3008F PR BODY MASS INDEX (BMI) DOCUMENTED: ICD-10-PCS | Mod: CPTII,S$GLB,, | Performed by: FAMILY MEDICINE

## 2019-04-12 PROCEDURE — 99999 PR PBB SHADOW E&M-EST. PATIENT-LVL III: CPT | Mod: PBBFAC,,, | Performed by: FAMILY MEDICINE

## 2019-04-12 RX ORDER — PROMETHAZINE HYDROCHLORIDE AND DEXTROMETHORPHAN HYDROBROMIDE 6.25; 15 MG/5ML; MG/5ML
5 SYRUP ORAL EVERY 6 HOURS PRN
Qty: 180 ML | Refills: 2 | Status: SHIPPED | OUTPATIENT
Start: 2019-04-12 | End: 2019-04-22

## 2019-04-12 RX ORDER — ETONOGESTREL AND ETHINYL ESTRADIOL VAGINAL RING .015; .12 MG/D; MG/D
1 RING VAGINAL
Qty: 1 EACH | Refills: 1 | Status: SHIPPED | OUTPATIENT
Start: 2019-04-12 | End: 2019-05-24

## 2019-04-12 NOTE — TELEPHONE ENCOUNTER
----- Message from Luba Fuentes NP sent at 4/12/2019  7:49 AM CDT -----  Contact: self  Rx sent in. She is due for annual at end of May. I gave her 2 refills.   ----- Message -----  From: Marija Kiran MA  Sent: 4/11/2019   4:16 PM  To: Luba Fuentes NP    Patient will like to go back on the nuva ring.   ----- Message -----  From: Amaya Brown  Sent: 4/11/2019  11:35 AM  To: [facility] Staff    Patient will like to change her birth control back to the nuvaring. Patient states she forgets to take the pill. Patient can be reached at [phone].

## 2019-04-12 NOTE — TELEPHONE ENCOUNTER
Contacted patient and informed prescription has been filled, and she is due for annual at the end of May. Patient stated she will call back to schedule annual appointment.

## 2019-04-12 NOTE — PROGRESS NOTES
"Subjective:      Patient ID: Keara Higgins is a 28 y.o. female.    Chief Complaint: Cough (x years off and on )      HPI:  Keara Higgins is a 28 year old female who presents to clinic today with a chief complaint of cough.    Cough has been an intermittent issue for about 1 year.  Occurs twice monthly on average, lasts approximately 1 week.  Productive of colorless mucous.  Endorses associated wheezing, shortness of breath with coughing spells (not at rest).  Endorses associated post-nasal drip.  Denies associated fevers/chills, chest pain, itchy/watery eyes.  States she had acid reflux once but this is not a persisting problem for her.  Symptoms gradually worsening.  Denies any known aggravating factors.  Denies any known alleviating factors.  Uses Delym and cepocal cough drops; cough drops helps a little when in a coughing "spell."  No previous work up.  Does not smoke.  Denies history of known pulmonary disease.  Father had lung cancer so patient would like to ensure her cough is not related to something more serious.    History reviewed. No pertinent past medical history.    Past Surgical History:   Procedure Laterality Date    gallblader removal         Family History   Problem Relation Age of Onset    Hypertension Mother     Heart disease Father     Diabetes Father     Prostate cancer Father     Cancer Maternal Grandmother     Cancer Maternal Grandfather     Heart disease Paternal Grandmother     Heart disease Paternal Grandfather        Social History     Socioeconomic History    Marital status:      Spouse name: Not on file    Number of children: Not on file    Years of education: Not on file    Highest education level: Not on file   Occupational History    Not on file   Social Needs    Financial resource strain: Not on file    Food insecurity:     Worry: Not on file     Inability: Not on file    Transportation needs:     Medical: Not on file     Non-medical: Not on file " "  Tobacco Use    Smoking status: Never Smoker    Smokeless tobacco: Never Used   Substance and Sexual Activity    Alcohol use: No    Drug use: No    Sexual activity: Yes     Partners: Male     Birth control/protection: None   Lifestyle    Physical activity:     Days per week: Not on file     Minutes per session: Not on file    Stress: Not on file   Relationships    Social connections:     Talks on phone: Not on file     Gets together: Not on file     Attends Scientologist service: Not on file     Active member of club or organization: Not on file     Attends meetings of clubs or organizations: Not on file     Relationship status: Not on file   Other Topics Concern    Not on file   Social History Narrative    Not on file       Review of Systems   Constitutional: Negative for chills, fatigue and fever.   HENT: Positive for postnasal drip. Negative for congestion, hearing loss, nosebleeds, rhinorrhea, sore throat and trouble swallowing.    Eyes: Negative for pain and visual disturbance.   Respiratory: Positive for cough, shortness of breath and wheezing.    Cardiovascular: Negative for chest pain and palpitations.   Gastrointestinal: Negative for abdominal distention, abdominal pain, constipation, diarrhea, nausea and vomiting.   Genitourinary: Negative for decreased urine volume, difficulty urinating, dysuria, hematuria and urgency.   Musculoskeletal: Negative for arthralgias, back pain and myalgias.   Skin: Negative for color change and rash.   Neurological: Negative for dizziness, tremors, weakness, light-headedness, numbness and headaches.   Psychiatric/Behavioral: Negative for agitation, behavioral problems and confusion. The patient is not nervous/anxious.      Objective:     Vitals:    04/12/19 0849   BP: 100/70   BP Location: Left arm   Patient Position: Sitting   BP Method: Medium (Manual)   Pulse: 84   Temp: 98.7 °F (37.1 °C)   TempSrc: Oral   SpO2: 99%   Weight: 72.3 kg (159 lb 6.3 oz)   Height: 5' 8" " (1.727 m)       Physical Exam   Constitutional: She is oriented to person, place, and time. She appears well-developed and well-nourished.   HENT:   Head: Normocephalic and atraumatic.   Right Ear: External ear normal.   Left Ear: External ear normal.   Nose: Nose normal.   Mouth/Throat: Oropharynx is clear and moist.   Eyes: Pupils are equal, round, and reactive to light. Conjunctivae and EOM are normal.   Neck: Normal range of motion. Neck supple. No tracheal deviation present.   Cardiovascular: Normal rate, regular rhythm and normal heart sounds. Exam reveals no gallop and no friction rub.   No murmur heard.  Pulmonary/Chest: Effort normal and breath sounds normal. No respiratory distress. She has no wheezes. She has no rales.   Abdominal: Soft. Bowel sounds are normal. She exhibits no distension. There is no tenderness. There is no rebound and no guarding.   Musculoskeletal: Normal range of motion. She exhibits no edema or deformity.   Neurological: She is alert and oriented to person, place, and time. Coordination normal.   Skin: Skin is warm and dry.   Psychiatric: She has a normal mood and affect. Her behavior is normal.   Nursing note and vitals reviewed.     Assessment:      1. Chronic cough    2. Shortness of breath      Plan:   Keara was seen today for cough.    Diagnoses and all orders for this visit:    Chronic cough  -     X-Ray Chest PA And Lateral; Future  -     Complete PFT with bronchodilator; Future  -     PULSE OXIMETRY WITH REST - PULM; Future  -     promethazine-dextromethorphan (PROMETHAZINE-DM) 6.25-15 mg/5 mL Syrp; Take 5 mLs by mouth every 6 (six) hours as needed (cough).    Shortness of breath  -     Complete PFT with bronchodilator; Future  -     PULSE OXIMETRY WITH REST - PULM; Future

## 2019-05-13 RX ORDER — TIZANIDINE 4 MG/1
4 TABLET ORAL 3 TIMES DAILY PRN
COMMUNITY
End: 2019-05-13 | Stop reason: SDUPTHER

## 2019-05-13 RX ORDER — TIZANIDINE 4 MG/1
4 TABLET ORAL 3 TIMES DAILY PRN
Qty: 90 TABLET | Refills: 0 | Status: SHIPPED | OUTPATIENT
Start: 2019-05-13 | End: 2019-05-24

## 2019-05-24 ENCOUNTER — OFFICE VISIT (OUTPATIENT)
Dept: OPTOMETRY | Facility: CLINIC | Age: 29
End: 2019-05-24
Payer: COMMERCIAL

## 2019-05-24 DIAGNOSIS — H04.123 DRY EYE SYNDROME OF BOTH EYES: Primary | ICD-10-CM

## 2019-05-24 DIAGNOSIS — R51.9 HEADACHE BEHIND THE EYES: ICD-10-CM

## 2019-05-24 DIAGNOSIS — H52.7 ACCOMMODATIVE DYSFUNCTION: ICD-10-CM

## 2019-05-24 DIAGNOSIS — H52.223 REGULAR ASTIGMATISM OF BOTH EYES: ICD-10-CM

## 2019-05-24 PROCEDURE — 92015 PR REFRACTION: ICD-10-PCS | Mod: S$GLB,,, | Performed by: OPTOMETRIST

## 2019-05-24 PROCEDURE — 99999 PR PBB SHADOW E&M-EST. PATIENT-LVL III: ICD-10-PCS | Mod: PBBFAC,,, | Performed by: OPTOMETRIST

## 2019-05-24 PROCEDURE — 92004 PR EYE EXAM, NEW PATIENT,COMPREHESV: ICD-10-PCS | Mod: S$GLB,,, | Performed by: OPTOMETRIST

## 2019-05-24 PROCEDURE — 92004 COMPRE OPH EXAM NEW PT 1/>: CPT | Mod: S$GLB,,, | Performed by: OPTOMETRIST

## 2019-05-24 PROCEDURE — 99999 PR PBB SHADOW E&M-EST. PATIENT-LVL III: CPT | Mod: PBBFAC,,, | Performed by: OPTOMETRIST

## 2019-05-24 PROCEDURE — 92015 DETERMINE REFRACTIVE STATE: CPT | Mod: S$GLB,,, | Performed by: OPTOMETRIST

## 2019-05-24 NOTE — PROGRESS NOTES
HPI     LINCOLN:1st exam   Glasses? no  Contacts? no  H/o eye surgery, injections or laser: no  H/o eye injury: no  Known eye conditions? no  Family h/o eye conditions? no  Eye gtts?no    (+) Flashes OU occasional, two +months present w/ or w/o having HA's  (-)   Floaters (-) Mucous   (-) Tearing (-) Itching (+) Burning OU while on computer   (+) Headaches frequent for the past two+  Months                           Tension, temple area, behind eyes and also   central head                             All day every day pain ranging 3-9 on pain scale   for the past month    (+) Eye Pain/discomfort OU past two+months w/ or w/o HA's- sharp pain   usually   (-) Irritation   (-) Redness (-) Double vision (+) Blurry vision  Near va OU for the past   two +months     Diabetic? (-)  A1c?  (No results found for: HGBA1C)          Last edited by Abbey Powell, OD on 5/24/2019  1:54 PM. (History)        ROS     Negative for: Constitutional, Gastrointestinal, Neurological, Skin,   Genitourinary, Musculoskeletal, HENT, Endocrine, Cardiovascular, Eyes,   Respiratory, Psychiatric, Allergic/Imm, Heme/Lymph    Last edited by Abbey Powell, OD on 5/24/2019  1:54 PM. (History)        Assessment /Plan     For exam results, see Encounter Report.    Dry eye syndrome of both eyes    Accommodative dysfunction    Regular astigmatism of both eyes    Headache behind the eyes      1. Recommend Systane Ultra or Refresh Optive BID-TID OU to aid with symptoms of dry eyes.  2-3. SRx released to patient. Patient educated on lens options. Normal ocular health. RTC 1 year for routine exam.   4. No e/o ocular pathology. Question of sinuses playing a part. Pt has apptmt scheduled with PCP. Pt advised to mention to PCP.

## 2019-05-27 ENCOUNTER — OFFICE VISIT (OUTPATIENT)
Dept: INTERNAL MEDICINE | Facility: CLINIC | Age: 29
End: 2019-05-27
Payer: COMMERCIAL

## 2019-05-27 VITALS
BODY MASS INDEX: 24.15 KG/M2 | HEIGHT: 68 IN | TEMPERATURE: 98 F | WEIGHT: 159.38 LBS | SYSTOLIC BLOOD PRESSURE: 116 MMHG | RESPIRATION RATE: 15 BRPM | HEART RATE: 72 BPM | DIASTOLIC BLOOD PRESSURE: 80 MMHG

## 2019-05-27 DIAGNOSIS — Z00.00 ANNUAL PHYSICAL EXAM: Primary | ICD-10-CM

## 2019-05-27 DIAGNOSIS — M54.9 PAIN, UPPER BACK: ICD-10-CM

## 2019-05-27 DIAGNOSIS — R51.9 NONINTRACTABLE HEADACHE, UNSPECIFIED CHRONICITY PATTERN, UNSPECIFIED HEADACHE TYPE: ICD-10-CM

## 2019-05-27 DIAGNOSIS — J30.9 ALLERGIC RHINITIS WITH POSTNASAL DRIP: ICD-10-CM

## 2019-05-27 DIAGNOSIS — Z11.3 SCREENING EXAMINATION FOR STD (SEXUALLY TRANSMITTED DISEASE): ICD-10-CM

## 2019-05-27 DIAGNOSIS — M54.2 NECK PAIN: ICD-10-CM

## 2019-05-27 DIAGNOSIS — R05.3 PERSISTENT COUGH: ICD-10-CM

## 2019-05-27 DIAGNOSIS — R09.82 ALLERGIC RHINITIS WITH POSTNASAL DRIP: ICD-10-CM

## 2019-05-27 PROCEDURE — 99999 PR PBB SHADOW E&M-EST. PATIENT-LVL IV: ICD-10-PCS | Mod: PBBFAC,,, | Performed by: HOSPITALIST

## 2019-05-27 PROCEDURE — 99395 PR PREVENTIVE VISIT,EST,18-39: ICD-10-PCS | Mod: S$GLB,,, | Performed by: HOSPITALIST

## 2019-05-27 PROCEDURE — 99395 PREV VISIT EST AGE 18-39: CPT | Mod: S$GLB,,, | Performed by: HOSPITALIST

## 2019-05-27 PROCEDURE — 99999 PR PBB SHADOW E&M-EST. PATIENT-LVL IV: CPT | Mod: PBBFAC,,, | Performed by: HOSPITALIST

## 2019-05-27 RX ORDER — IBUPROFEN 800 MG/1
800 TABLET ORAL 3 TIMES DAILY PRN
Qty: 21 TABLET | Refills: 0 | Status: SHIPPED | OUTPATIENT
Start: 2019-05-27 | End: 2020-02-24

## 2019-05-27 RX ORDER — LORATADINE 10 MG/1
10 TABLET ORAL DAILY
Refills: 0 | COMMUNITY
Start: 2019-05-27 | End: 2021-05-13

## 2019-05-27 NOTE — PROGRESS NOTES
Subjective:     @Patient ID: Keara Higgins is a 28 y.o. female.    Chief Complaint: Cough (productive--); Headache (light sensitivity); Eye Problem (blurred vision); Back Pain; Acne; and Stress    HPI  29 yo F presents for annual and with different issues. Pt is new to me    1. Headache: reports started 1.5 months and worsening. Appear to be constant and daily. Reports pain 9/10. Reports behind eyes, left temple  And top-center of her head. Feels like a burning sensation or sharp sensation. Reports tylenol and ibuprofen once a day (usually 2-3 tablets). No relief. Reports having blurry vision. Only at work and occurs when looking at the computer.   Only wears reading glasess     2. Neck/backPain: Sx started few weeks ago. Located upper back pain and neck pain; feels like tightness, stabbing sensation. Reports constants. Uses tylenol/ibuprofen. No pain with ROM. Reports she has been snoring a lot     3. Cough: Onset 2 years ago. States when episodes occur she has sob and wheeze. Seen by primary care 1 month ago and CXR normal. Has not yet done PFTs     Reports she is under stress with her job. Working longer hours as they are short staffed. She works for [employer]. States her normal 8 hour shifts are now 12 hours 5 days a week. She does run for exercise but not as much     Lipid disorders/ASCVD risk (ages >/= 45 or >/= 20 if increased risk ): ordered  DM (>45y yearly or if obese, HTN): A1c   Hepatitis C (one time if born between [id number]): ordered  STD screening: ordered  Eye exam: done 2019   Cervical Cancer (Pap Smear ages 21-65 every 3 years or Pap + HPV q5 years after 30 years of age): Dr Mattson. Pap done 2018     Vaccines:   Influenza (yearly):   Tetanus (every 10 yrs - 1st tdap): done within 10 years     Exercise: running  Diet: regular       Most of ROS filled out by pt prior to arrival   Review of Systems   Constitutional: Negative for chills and fever.   HENT: Positive for ear pain,  "postnasal drip and rhinorrhea. Negative for congestion and sore throat.    Eyes: Negative for pain and visual disturbance.   Respiratory: Positive for cough, shortness of breath and wheezing.    Cardiovascular: Negative for chest pain and leg swelling.   Gastrointestinal: Negative for abdominal pain, nausea and vomiting.   Endocrine: Negative for polydipsia and polyuria.   Genitourinary: Negative for difficulty urinating and dysuria.   Musculoskeletal: Negative for arthralgias, back pain and myalgias.   Skin: Negative for rash and wound.   Allergic/Immunologic: Negative for environmental allergies.   Neurological: Positive for headaches. Negative for dizziness and weakness.   Psychiatric/Behavioral: Negative for agitation and confusion.     Past medical history, surgical history, and family medical history reviewed and updated as appropriate.    Medications and allergies reviewed.     Objective:     Vitals:    05/27/19 1309   BP: 116/80   BP Location: Right arm   Patient Position: Sitting   Pulse: 72   Resp: 15   Temp: 98.3 °F (36.8 °C)   TempSrc: Oral   Weight: 72.3 kg (159 lb 6.3 oz)   Height: 5' 7.5" (1.715 m)     Body mass index is 24.6 kg/m².  Physical Exam   Constitutional: She is oriented to person, place, and time. She appears well-developed and well-nourished. No distress.   HENT:   Head: Normocephalic and atraumatic.   Mouth/Throat: Oropharynx is clear and moist. No oropharyngeal exudate.   Eyes: Conjunctivae are normal. Right eye exhibits no discharge. Left eye exhibits no discharge. No scleral icterus.   Neck: Normal range of motion. Neck supple.   Cardiovascular: Normal rate, regular rhythm and intact distal pulses. Exam reveals no friction rub.   No murmur heard.  Pulmonary/Chest: Effort normal and breath sounds normal.   Abdominal: Soft. Bowel sounds are normal. She exhibits no distension. There is no tenderness. There is no guarding.   Musculoskeletal: Normal range of motion. She exhibits no edema. "   Lymphadenopathy:     She has no cervical adenopathy.   Neurological: She is alert and oriented to person, place, and time.   Skin: Skin is warm and dry.   Psychiatric: She has a normal mood and affect. Her behavior is normal.   Vitals reviewed.      No results found for: WBC, HGB, HCT, PLT, CHOL, TRIG, HDL, LDLDIRECT, ALT, AST, NA, K, CL, CREATININE, BUN, CO2, TSH, PSA, INR, GLUF, HGBA1C, MICROALBUR    Assessment:     1. Annual physical exam    2. Persistent cough    3. Nonintractable headache, unspecified chronicity pattern, unspecified headache type    4. Screening examination for STD (sexually transmitted disease)    5. Pain, upper back    6. Neck pain    7. Allergic rhinitis with postnasal drip      Plan:   Keara was seen today for cough, headache, eye problem, back pain, acne and stress.    Diagnoses and all orders for this visit:    Annual physical exam  -     Comprehensive metabolic panel; Future  -     CBC auto differential; Future  -     TSH; Future  -     Vitamin D; Future  -     Lipid panel; Future  -     Urinalysis; Future  -     RPR; Future  -     C. trachomatis/N. gonorrhoeae by AMP DNA [facility]; Urine; Future  -     HIV 1/2 Ag/Ab (4th Gen); Future  -     Hepatitis panel, acute; Future    Persistent cough  - Suspect due to allergies vs asthma. Recommend PFTs  -     Complete PFT w/ bronchodilator; Future    Nonintractable headache, unspecified chronicity pattern, unspecified headache type  - Unclear etiology. Possibly tension type Vs cluster? Suspect due to stress with job. Ok to continue tylenol/nsaid use. Notify MD if no improvement   -     Ambulatory Referral to Neurology    Screening examination for STD (sexually transmitted disease)  -     RPR; Future  -     C. trachomatis/N. gonorrhoeae by AMP DNA [facility]; Urine; Future  -     HIV 1/2 Ag/Ab (4th Gen); Future  -     Hepatitis panel, acute; Future    Pain, upper back  - Likely musculoskeletal  -     ibuprofen (ADVIL,MOTRIN) 800 MG tablet; Take 1  tablet (800 mg total) by mouth 3 (three) times daily as needed for Pain.    Neck pain  - Likely musculoskeletal  -     ibuprofen (ADVIL,MOTRIN) 800 MG tablet; Take 1 tablet (800 mg total) by mouth 3 (three) times daily as needed for Pain.    Allergic rhinitis with postnasal drip  -     loratadine (CLARITIN) 10 mg tablet; Take 1 tablet (10 mg total) by mouth once daily.          Follow up in about 1 year (around 5/27/2020), or if symptoms worsen or fail to improve.    Cinthia Ely MD  Internal Medicine    5/27/2019

## 2019-05-27 NOTE — PATIENT INSTRUCTIONS
Stress Relief: A Positive Lifestyle  Learning to manage stress doesn't happen overnight. It's a process. The more you keep at it, the more you'll feel in control of daily events.     Leave plenty of time for family fun. Have a cookout or play games together. And try to share at least one meal each day.        Set limits  Trying to fit too much into a day is a major cause of stress. Setting limits will help you feel more in control. This sometimes means saying no--to people and even to things you might want to do. This can be hard at times. But knowing your priorities can help you make choices.  Know your priorities  Using your time and energy wisely is a good way to control stress. Save time for the things that matter most in your life. Ask yourself: Do I really need to do this? Do I want to do this? If you answer yes, then go ahead. But keep in mind you can also answer no.  Learn to accept support  Everybody needs support now and then. So don't feel embarrassed to ask for help when you need it. Most people are glad to lend a hand. And asking for help can open up new lines of communication and friendships.  Stress and children  Be careful not to take your stress out on your children. They may not understand why you're stressed. But they can sense your moods. Be aware that many children--especially teenagers--are under stress, too. So plan time to talk with your kids. Ask them about school and any problems theyre having. Finally, make sure they have plenty of time to just be kids and have fun.  Be part of your community  Taking part in community or mariam-based events can offer a sense of belonging. It also helps put you in touch with active and caring people nearby. So whether its a clean-up day at a local park or taking meals to the elderly, try to reach out to friends and neighbors. Just remember: Taking time for yourself once in a while makes it easier to help others later on.   Date Last Reviewed:  1/1/2017 © 2000-2017 [employer]. [address] [place] [id number]. All rights reserved. This information is not intended as a substitute for professional medical care. Always follow your healthcare professional's instructions.        Stress Relief: Activities  When you're feeling stressed, some simple exercises can provide relief right away. These exercises are not the kind you need sweatpants for. You can do them almost anytime and anywhere. They will help you feel more relaxed.  Walking    Taking a walk is a great way to fight stress. Walking offers a chance to take a break from a stressful situation. It can also give you a few minutes to think things through. Even a short walk can help you feel better. That's because walking is a positive action that you control.  Stretching  Muscle tension is a common response to stress. Stretching is a simple way to loosen up. Try these stretches:  · Neck stretch. Sit up straight and tuck in your chin. Place your left hand on the right side of your head. Gently pull your head to the left and hold for 10 seconds. Switch sides and repeat the exercise.  · Shoulder and arm stretch. Put your hands together and lock your fingers. Then raise your hands above your head, palms upward. Hold for 15 seconds and relax. Repeat 3 times.  Deep breathing  Deep breathing is a simple method for relieving tension. Use 3 deep breaths each time you do this exercise.  1. Inhale. Breathe in slowly and deeply through your nose. Take in as much air as possible. Hold for 3 seconds.  2. Exhale. Breathe out slowly through your mouth. Try pursing your lips as if you were going to whistle. This helps control how fast you exhale.  Date Last Reviewed: 1/1/2017 © 2000-2017 [employer]. [address] [place] [id number]. All rights reserved. This information is not intended as a substitute for professional medical care. Always follow your healthcare professional's  instructions.

## 2019-09-24 ENCOUNTER — OFFICE VISIT (OUTPATIENT)
Dept: URGENT CARE | Facility: CLINIC | Age: 29
End: 2019-09-24
Payer: COMMERCIAL

## 2019-09-24 VITALS
HEART RATE: 63 BPM | RESPIRATION RATE: 18 BRPM | TEMPERATURE: 99 F | WEIGHT: 159 LBS | OXYGEN SATURATION: 98 % | DIASTOLIC BLOOD PRESSURE: 105 MMHG | HEIGHT: 68 IN | BODY MASS INDEX: 24.1 KG/M2 | SYSTOLIC BLOOD PRESSURE: 144 MMHG

## 2019-09-24 DIAGNOSIS — R07.81 RIB PAIN ON RIGHT SIDE: Primary | ICD-10-CM

## 2019-09-24 DIAGNOSIS — N64.4 SORENESS BREAST: ICD-10-CM

## 2019-09-24 DIAGNOSIS — Z76.89 ENCOUNTER TO ESTABLISH CARE: ICD-10-CM

## 2019-09-24 DIAGNOSIS — R10.2 VAGINAL PAIN: ICD-10-CM

## 2019-09-24 DIAGNOSIS — R05.9 COUGH: ICD-10-CM

## 2019-09-24 LAB
B-HCG UR QL: NEGATIVE
CTP QC/QA: YES

## 2019-09-24 PROCEDURE — 3008F BODY MASS INDEX DOCD: CPT | Mod: CPTII,S$GLB,, | Performed by: PHYSICIAN ASSISTANT

## 2019-09-24 PROCEDURE — 99214 PR OFFICE/OUTPT VISIT, EST, LEVL IV, 30-39 MIN: ICD-10-PCS | Mod: S$GLB,,, | Performed by: PHYSICIAN ASSISTANT

## 2019-09-24 PROCEDURE — 93005 EKG 12-LEAD: ICD-10-PCS | Mod: S$GLB,,, | Performed by: PHYSICIAN ASSISTANT

## 2019-09-24 PROCEDURE — 93010 EKG 12-LEAD: ICD-10-PCS | Mod: S$GLB,,, | Performed by: INTERNAL MEDICINE

## 2019-09-24 PROCEDURE — 3008F PR BODY MASS INDEX (BMI) DOCUMENTED: ICD-10-PCS | Mod: CPTII,S$GLB,, | Performed by: PHYSICIAN ASSISTANT

## 2019-09-24 PROCEDURE — 99214 OFFICE O/P EST MOD 30 MIN: CPT | Mod: S$GLB,,, | Performed by: PHYSICIAN ASSISTANT

## 2019-09-24 PROCEDURE — 71101 X-RAY EXAM UNILAT RIBS/CHEST: CPT | Mod: FY,RT,S$GLB, | Performed by: RADIOLOGY

## 2019-09-24 PROCEDURE — 93005 ELECTROCARDIOGRAM TRACING: CPT | Mod: S$GLB,,, | Performed by: PHYSICIAN ASSISTANT

## 2019-09-24 PROCEDURE — 93010 ELECTROCARDIOGRAM REPORT: CPT | Mod: S$GLB,,, | Performed by: INTERNAL MEDICINE

## 2019-09-24 PROCEDURE — 81025 URINE PREGNANCY TEST: CPT | Mod: S$GLB,,, | Performed by: PHYSICIAN ASSISTANT

## 2019-09-24 PROCEDURE — 81025 POCT URINE PREGNANCY: ICD-10-PCS | Mod: S$GLB,,, | Performed by: PHYSICIAN ASSISTANT

## 2019-09-24 PROCEDURE — 71101 XR RIBS MIN 3 VIEWS W/ PA CHEST RIGHT: ICD-10-PCS | Mod: FY,RT,S$GLB, | Performed by: RADIOLOGY

## 2019-09-25 NOTE — PROGRESS NOTES
"Subjective:       Patient ID: Keara Higgins is a 28 y.o. female.    Vitals:  height is 5' 7.5" (1.715 m) and weight is 72.1 kg (159 lb). Her temperature is 98.8 °F (37.1 °C). Her blood pressure is 144/105 (abnormal) and her pulse is 63. Her respiration is 18 and oxygen saturation is 98%.     Chief Complaint: Breast Pain    Toes numb, vaginal area throbbing, breast pain, sharp pain on right side under right breast over ribs.  She denies any vaginal itching or discharge. She states last time she was pregnant she had breast pain and vaginal pain like this.      Vaginal Pain   The patient's pertinent negatives include no genital itching, genital lesions, genital odor, genital rash, missed menses, pelvic pain, vaginal bleeding or vaginal discharge. This is a new problem. The current episode started yesterday. The problem occurs constantly. The problem has been unchanged. The pain is moderate. She is not pregnant. Pertinent negatives include no abdominal pain, anorexia, back pain, chills, constipation, diarrhea, discolored urine, dysuria, fever, flank pain, frequency, headaches, hematuria, joint pain, joint swelling, nausea, painful intercourse, rash, sore throat, urgency or vomiting. Nothing aggravates the symptoms. She has tried nothing for the symptoms. She is sexually active. No, her partner does not have an STD. She uses nothing for contraception. Her menstrual history has been regular.       Constitution: Negative for chills, fatigue and fever.   HENT: Negative for congestion and sore throat.    Neck: Negative for painful lymph nodes.   Cardiovascular: Negative for chest pain and leg swelling.   Eyes: Negative for double vision and blurred vision.   Respiratory: Negative for cough and shortness of breath.    Gastrointestinal: Negative for abdominal pain, nausea, vomiting, constipation and diarrhea.   Genitourinary: Positive for vaginal pain. Negative for dysuria, frequency, urgency, flank pain, hematuria, history " of kidney stones, missed menses, vaginal discharge and pelvic pain.   Musculoskeletal: Positive for pain. Negative for joint pain, joint swelling, back pain, muscle cramps and muscle ache.   Skin: Negative for color change, pale, rash and bruising.   Allergic/Immunologic: Negative for seasonal allergies.   Neurological: Positive for numbness. Negative for dizziness, history of vertigo, light-headedness, passing out and headaches.   Hematologic/Lymphatic: Negative for swollen lymph nodes.   Psychiatric/Behavioral: Negative for nervous/anxious, sleep disturbance and depression. The patient is not nervous/anxious.        Objective:      Physical Exam   Constitutional: She is oriented to person, place, and time. She appears well-developed and well-nourished. She is cooperative.  Non-toxic appearance. She does not appear ill. No distress.   HENT:   Head: Normocephalic and atraumatic.   Right Ear: Hearing, tympanic membrane, external ear and ear canal normal.   Left Ear: Hearing, tympanic membrane, external ear and ear canal normal.   Nose: Nose normal. No mucosal edema, rhinorrhea or nasal deformity. No epistaxis. Right sinus exhibits no maxillary sinus tenderness and no frontal sinus tenderness. Left sinus exhibits no maxillary sinus tenderness and no frontal sinus tenderness.   Mouth/Throat: Uvula is midline, oropharynx is clear and moist and mucous membranes are normal. No trismus in the jaw. Normal dentition. No uvula swelling. No posterior oropharyngeal erythema.   Eyes: Conjunctivae and lids are normal. Right eye exhibits no discharge. Left eye exhibits no discharge. No scleral icterus.   Sclera clear bilat   Neck: Trachea normal, normal range of motion, full passive range of motion without pain and phonation normal. Neck supple.   Cardiovascular: Normal rate, regular rhythm, normal heart sounds, intact distal pulses and normal pulses.   Pulmonary/Chest: Effort normal and breath sounds normal. No accessory muscle  usage or stridor. No respiratory distress. She has no decreased breath sounds. She has no wheezes. She has no rhonchi. She has no rales. Chest wall is not dull to percussion. She exhibits tenderness. She exhibits no mass, no bony tenderness, no laceration, no crepitus, no deformity, no swelling and no retraction.       Abdominal: Soft. Normal appearance and bowel sounds are normal. She exhibits no distension, no pulsatile midline mass and no mass. There is no tenderness. Hernia confirmed negative in the right inguinal area and confirmed negative in the left inguinal area.   Genitourinary: Rectum normal and vagina normal. Pelvic exam was performed with patient supine. No labial fusion. There is no rash, tenderness, lesion or injury on the right labia. There is no rash, tenderness, lesion or injury on the left labia. No erythema, tenderness or bleeding in the vagina. No foreign body in the vagina. No signs of injury around the vagina. No vaginal discharge found.   Musculoskeletal: Normal range of motion. She exhibits no edema or deformity.   Lymphadenopathy: No inguinal adenopathy noted on the right or left side.   Neurological: She is alert and oriented to person, place, and time. She exhibits normal muscle tone. Coordination normal.   Skin: Skin is warm, dry and intact. She is not diaphoretic. No pallor.   Psychiatric: She has a normal mood and affect. Her speech is normal and behavior is normal. Judgment and thought content normal. Cognition and memory are normal.   Nursing note and vitals reviewed.        XRAY:  No acute fractures or dislocations noted. No acute intrathoracic processes noted    EKG: NSR; BPM 67; No ST Changes    Assessment:       1. Rib pain on right side    2. Soreness breast    3. Cough    4. Encounter to establish care    5. Vaginal pain        Plan:         Rib pain on right side  -     XR RIB RIGHT W/ PA CHEST; Future; Expected date: 09/24/2019  -     Ambulatory referral to Internal  Medicine    Soreness breast  -     POCT urine pregnancy  -     Ambulatory referral to Internal Medicine  -     Ambulatory referral to Obstetrics / Gynecology    Cough  -     EKG 12-lead  -     Ambulatory referral to Internal Medicine    Encounter to establish care  -     Ambulatory referral to Internal Medicine  -     Ambulatory referral to Obstetrics / Gynecology    Vaginal pain  -     Ambulatory referral to Internal Medicine  -     Ambulatory referral to Obstetrics / Gynecology

## 2019-09-25 NOTE — PATIENT INSTRUCTIONS
Noncardiac Chest Pain    Based on your visit today, the healthcare provider doesnt know what is causing your chest pain. In most cases, people who come to the emergency department with chest pain dont have a problem with their heart. Instead, the pain is caused by other conditions. It's important for the healthcare team to be sure you are not having a life threatening cause for chest pain such as a heart attack, blood clot in the lungs, collapsed lung, ruptured esophagus, or tearing of the aorta. Once these major causes have been ruled out, you may have further evaluation for non-heart causes of chest pain. These may be problems with the lungs, muscles, bones, digestive tract, nerves, or mental health.  Lung problems  · Inflammation around the lungs (pleurisy)  · Collapsed lung (pneumothorax)  · Fluid around the lungs (pleural effusion)  · Lung cancer (a rare cause of chest pain)  Muscle or bone problems  · Inflamed cartilage between the ribs (costochondritis)  · Fibromyalgia  · Rheumatoid arthritis  · Chest wall strain  Digestive system problems  · Reflux  · Stomach ulcer  · Spasms of the esophagus  · Gall stones  · Gallbladder inflammation  Mental health conditions  · Panic or anxiety attacks  · Emotional distress  Your condition doesnt seem serious and your pain doesnt appear to be coming from your heart. But sometimes the signs of a serious problem take more time to appear. Watch for the warning signs listed below.  Home care  Follow these guidelines when caring for yourself at home:  · Rest today and avoid strenuous activity.  · Take any prescribed medicine as directed.  Follow-up care  Follow up with your healthcare provider, or as advised, if you dont start to feel better within 24 hours.  When to seek medical advice  Call your healthcare provider right away if any of these occur:  · A change in the type of pain. Call if it feels different, becomes more serious, lasts longer, or begins to spread into  your shoulder, arm, neck, jaw, or back.  · Shortness of breath  · You feel more pain when you breathe  · Cough with dark-colored mucus or blood  · Weakness, dizziness, or fainting  · Fever of 100.4ºF (38ºC) or higher, or as directed by your healthcare provider  · Swelling, pain, or redness in one leg  Date Last Reviewed: 12/1/2016 © 2000-2017 [employer]. [address] [place] [address]. All rights reserved. This information is not intended as a substitute for professional medical care. Always follow your healthcare professional's instructions.        Breast Anatomy    Breasts come in all shapes and sizes. But they all share the same features. You can learn about the parts, or anatomy, of your breasts. This will help you know what you're seeing and feeling. Then you can learn what's normal for your breasts.     The areola is a dark [place] of skin that surrounds the nipple.  The nipple is the outlet for milk during breastfeeding.  Fibrous tissue supports your breasts, making them feel firm.  Lobules (mammary glands) produce milk during pregnancy and breastfeeding.  Ducts carry milk from the lobules during breastfeeding.  Fatty tissue fills the spaces around the ducts and lobules.  Axillary lymph nodes filter lymph fluid from your breast and help your body fight infection.  Ribs can be felt beneath the skin.  The clavicle (collarbone) marks the upper boundary of the breast tissue.  The sternum (breastbone) can be felt beneath the skin.  Chest muscles help move your arm.     Date Last Reviewed: 8/13/2015 © 2000-2017 [employer]. [address] [place] [address]. All rights reserved. This information is not intended as a substitute for professional medical care. Always follow your healthcare professional's instructions.        Preventing Vaginitis     Use mild, unscented soap when you bathe or shower to avoid irritating your vagina.    Vaginitis is irritation or infection  of the vagina or vulva (the outside opening of the vagina). Vaginitis can be caused by bacteria, viruses, parasites, or yeast. Chemicals (such as in perfumes or soaps or in spermicides) can sometimes be a cause. Vaginitis can be caused by hormone changes in pregnancy or with menopause. You can help prevent vaginitis. Follow the tips below. And see your healthcare provider if you have any symptoms.  Hygiene  · Avoid chemicals. Do not use vaginal sprays. Do not use scented toilet paper or tampons that are scented. Sprays and scents have chemicals that can irritate your vagina.  · Do not douche unless you are told to by your healthcare provider. Douching is rarely needed. And it upsets the normal balance in the vagina.  · Wash yourself well. Wash the outer vaginal area (vulva) every day with mild, unscented soap. Keep it as dry as possible.  · Wipe correctly. Make sure to wipe from front to back after a bowel movement. This helps keep from spreading bacteria from your anus to your vagina.  · Change your tampon often. During your period, make sure to change your tampon as often as directed on the package. This allows the normal flow of vaginal discharge and blood.  Lifestyle  · Limit your number of sexual partners. The more partners you have, the greater your risk of infection. Using condoms helps reduce your risk.  · Get enough sleep. Sleep helps keep your bodys immune system healthy. This helps you fight infection.  · Lose weight, if needed. Excess weight can reduce air circulation around your vagina. This can increase your risk of infection.  · Exercise regularly. Regular activity helps keep your body healthy.  · Take antibiotics only as directed. Antibiotics can change the normal chemical balance in the vagina.    Clothing  · Dont sit in wet clothes. Yeast thrives when its warm and damp.  · Dont wear tight pants. And dont wear tights, leggings, or hose without a cotton crotch. These types of clothing trap  warmth and moisture.  · Wear cotton underwear. Cotton lets air circulate around the vagina.  Symptoms of vaginitis  · Irritation, swelling, or itching of the genital area  · Vaginal discharge  · Bad vaginal odor  · Pain or burning during urination   Date Last Reviewed: 12/1/2016  © 4331-8495 [employer]. [address] [place] [address], [place] [address]. All rights reserved. This information is not intended as a substitute for professional medical care. Always follow your healthcare professional's instructions.        Cough, Chronic, Uncertain Cause (Adult)    Everyone has had a cough as part of the common cold, flu, or bronchitis. This kind of cough occurs along with an achy feeling, low-grade fever, nasal and sinus congestion, and a scratchy or sore throat. This usually gets better in 2 to 3 weeks. A cough that lasts longer than 3 weeks may be due to other causes.  If your cough does not improve over the next 2 weeks, further testing may be needed. Follow up with your healthcare provider as advised. Cough suppressants may be recommended. Based on your exam today, the exact cause of your cough is not certain. Below are some common causes for persistent cough.  Smokers cough  Smokers cough doesnt go away. If you continue to smoke, it only gets worse. The cough is from irritation in the air passages. Talk to your healthcare provider about quitting. Medicines or nicotine-replacement products, like gum or the patch, may make quitting easier.  Postnasal drip  A cough that is worse at night may be due to postnasal drip. Excess mucus in the nose drains from the back of your nose to your throat. This triggers the cough reflex. Postnasal drip may be due to a sinus infection or allergy. Common allergens include dust, tobacco smoke (both inhaled and secondhand smoke), environmental pollutants, pollen, mold, pets, cleaning agents, room deodorizers, and chemical fumes. Over-the-counter antihistamines or decongestants  may be helpful for allergies. A sinus infection may requires antibiotic treatment. See your healthcare provider if symptoms continue.  Medicines  Certain prescribed medicines can cause a chronic cough in some people:  · ACE inhibitors for high blood pressure. These include benazepril, captopril, enalapril, fosinopril, lisinopril, quinapril, ramipril, and others.  · Beta-blockers for high blood pressure and other conditions. These include propranolol, atenolol, metoprolol, nadolol, and others.  Let your healthcare provider know if you are taking any of these.  Asthma  Cough may be the only sign of mild asthma. You may have tests to find out if asthma is causing your cough. You may also take asthma medicine on a trial basis.  Acid reflux (heartburn, GERD)  The esophagus is the tube that carries food from the mouth to the stomach. A valve at its lower end prevents stomach acids from flowing upward. If this valve does not work properly, acid from the stomach enters the esophagus. This may cause a burning pain in the upper abdomen or lower chest, belching, or cough. Symptoms are often worse when lying flat. Avoid eating or drinking before bedtime. Try using extra pillows to raise your upper body, or place 4-inch blocks under the head of your bed. You may try an over-the-counter antacid or an acid-blocking medicine such as famotidine, cimetidine, ranitidine, esomeprazole, lansoprazole, or omeprazole. Stronger medicines for this condition can be prescribed by your healthcare provider.  Follow-up care  Follow up with your healthcare provider, or as advised, if your cough does not improve. Further testing may be needed.  Note: If an X-ray was taken, a specialist will review it. You will be notified of any new findings that may affect your care.  When to seek medical advice  Call your healthcare provider right away if any of these occur:  · Mild wheezing or difficulty breathing  · Fever of 100.4ºF (38ºC) or higher, or as  directed by your healthcare provider  · Unexpected weight loss  · Coughing up large amounts of colored sputum  · Night sweats (sheets and pajamas get soaking wet)  Call 911, or get immediate medical care  Contact emergency services right away if any of these occur:  · Coughing up blood  · Moderate to severe trouble breathing or wheezing  Date Last Reviewed: 9/13/2015 © 2000-2017 [employer]. [address] [place] [address], [place] [address]. All rights reserved. This information is not intended as a substitute for professional medical care. Always follow your healthcare professional's instructions.      Please follow-up with your or per family practice provider for further evaluation as discussed.    Please follow up with your Primary care provider within 2-5 days if your signs and symptoms have not resolved or worsen.     If your condition worsens or fails to improve we recommend that you receive another evaluation at the emergency room immediately or contact your primary medical clinic to discuss your concerns.   You must understand that you have received an Urgent Care treatment only and that you may be released before all of your medical problems are known or treated. You, the patient, will arrange for follow up care as instructed.     RED FLAGS/WARNING SYMPTOMS DISCUSSED WITH PATIENT THAT WOULD WARRANT EMERGENT MEDICAL ATTENTION. PATIENT VERBALIZED UNDERSTANDING.

## 2019-11-12 ENCOUNTER — PATIENT MESSAGE (OUTPATIENT)
Dept: INTERNAL MEDICINE | Facility: CLINIC | Age: 29
End: 2019-11-12

## 2019-11-12 DIAGNOSIS — R51.9 NONINTRACTABLE HEADACHE, UNSPECIFIED CHRONICITY PATTERN, UNSPECIFIED HEADACHE TYPE: Primary | ICD-10-CM

## 2020-02-16 ENCOUNTER — HOSPITAL ENCOUNTER (EMERGENCY)
Facility: HOSPITAL | Age: 30
Discharge: HOME | End: 2020-02-16
Attending: EMERGENCY MEDICINE
Payer: COMMERCIAL

## 2020-02-16 VITALS
RESPIRATION RATE: 17 BRPM | OXYGEN SATURATION: 99 % | HEIGHT: 68 IN | BODY MASS INDEX: 25.01 KG/M2 | WEIGHT: 165 LBS | HEART RATE: 74 BPM | TEMPERATURE: 98 F | SYSTOLIC BLOOD PRESSURE: 121 MMHG | DIASTOLIC BLOOD PRESSURE: 75 MMHG

## 2020-02-16 DIAGNOSIS — M35.9 CONNECTIVE TISSUE DISEASE: Primary | ICD-10-CM

## 2020-02-16 LAB
ALBUMIN SERPL BCP-MCNC: 3.6 G/DL (ref 3.5–5.2)
ALP SERPL-CCNC: 37 U/L (ref 55–135)
ALT SERPL W/O P-5'-P-CCNC: 33 U/L (ref 10–44)
ANION GAP SERPL CALC-SCNC: 14 MMOL/L (ref 8–16)
AST SERPL-CCNC: 25 U/L (ref 10–40)
B-HCG UR QL: NEGATIVE
BASOPHILS # BLD AUTO: 0.03 K/UL (ref 0–0.2)
BASOPHILS NFR BLD: 0.3 % (ref 0–1.9)
BILIRUB SERPL-MCNC: 0.6 MG/DL (ref 0.1–1)
BUN SERPL-MCNC: 13 MG/DL (ref 6–20)
CALCIUM SERPL-MCNC: 8.9 MG/DL (ref 8.7–10.5)
CHLORIDE SERPL-SCNC: 99 MMOL/L (ref 95–110)
CO2 SERPL-SCNC: 28 MMOL/L (ref 23–29)
CREAT SERPL-MCNC: 0.8 MG/DL (ref 0.5–1.4)
CRP SERPL-MCNC: 0.51 MG/DL (ref 0–0.75)
CTP QC/QA: YES
DIFFERENTIAL METHOD: ABNORMAL
EOSINOPHIL # BLD AUTO: 0.2 K/UL (ref 0–0.5)
EOSINOPHIL NFR BLD: 1.9 % (ref 0–8)
ERYTHROCYTE [DISTWIDTH] IN BLOOD BY AUTOMATED COUNT: 11.9 % (ref 11.5–14.5)
ERYTHROCYTE [SEDIMENTATION RATE] IN BLOOD BY WESTERGREN METHOD: 22 MM/HR (ref 0–20)
EST. GFR  (AFRICAN AMERICAN): >60 ML/MIN/1.73 M^2
EST. GFR  (NON AFRICAN AMERICAN): >60 ML/MIN/1.73 M^2
GLUCOSE SERPL-MCNC: 105 MG/DL (ref 70–110)
HCT VFR BLD AUTO: 39.1 % (ref 37–48.5)
HGB BLD-MCNC: 12.7 G/DL (ref 12–16)
IMM GRANULOCYTES # BLD AUTO: 0.03 K/UL (ref 0–0.04)
IMM GRANULOCYTES NFR BLD AUTO: 0.3 % (ref 0–0.5)
LYMPHOCYTES # BLD AUTO: 5.1 K/UL (ref 1–4.8)
LYMPHOCYTES NFR BLD: 49.8 % (ref 18–48)
MCH RBC QN AUTO: 29.5 PG (ref 27–31)
MCHC RBC AUTO-ENTMCNC: 32.5 G/DL (ref 32–36)
MCV RBC AUTO: 91 FL (ref 82–98)
MONOCYTES # BLD AUTO: 0.6 K/UL (ref 0.3–1)
MONOCYTES NFR BLD: 6 % (ref 4–15)
NEUTROPHILS # BLD AUTO: 4.3 K/UL (ref 1.8–7.7)
NEUTROPHILS NFR BLD: 41.7 % (ref 38–73)
NRBC BLD-RTO: 0 /100 WBC
PLATELET # BLD AUTO: 349 K/UL (ref 150–350)
PMV BLD AUTO: 9.9 FL (ref 9.2–12.9)
POTASSIUM SERPL-SCNC: 3.3 MMOL/L (ref 3.5–5.1)
PROT SERPL-MCNC: 6.9 G/DL (ref 6–8.4)
RBC # BLD AUTO: 4.31 M/UL (ref 4–5.4)
SODIUM SERPL-SCNC: 141 MMOL/L (ref 136–145)
URATE SERPL-MCNC: 4.8 MG/DL (ref 2.4–5.7)
WBC # BLD AUTO: 10.23 K/UL (ref 3.9–12.7)

## 2020-02-16 PROCEDURE — 99284 EMERGENCY DEPT VISIT MOD MDM: CPT | Mod: 25

## 2020-02-16 PROCEDURE — 63600175 PHARM REV CODE 636 W HCPCS: Performed by: EMERGENCY MEDICINE

## 2020-02-16 PROCEDURE — 86431 RHEUMATOID FACTOR QUANT: CPT

## 2020-02-16 PROCEDURE — 85651 RBC SED RATE NONAUTOMATED: CPT

## 2020-02-16 PROCEDURE — 85025 COMPLETE CBC W/AUTO DIFF WBC: CPT

## 2020-02-16 PROCEDURE — 80053 COMPREHEN METABOLIC PANEL: CPT

## 2020-02-16 PROCEDURE — 96374 THER/PROPH/DIAG INJ IV PUSH: CPT

## 2020-02-16 PROCEDURE — 84550 ASSAY OF BLOOD/URIC ACID: CPT

## 2020-02-16 PROCEDURE — 86140 C-REACTIVE PROTEIN: CPT

## 2020-02-16 PROCEDURE — 25000003 PHARM REV CODE 250: Performed by: EMERGENCY MEDICINE

## 2020-02-16 PROCEDURE — 81025 URINE PREGNANCY TEST: CPT | Performed by: EMERGENCY MEDICINE

## 2020-02-16 RX ORDER — HYDROCODONE BITARTRATE AND ACETAMINOPHEN 5; 325 MG/1; MG/1
1 TABLET ORAL EVERY 4 HOURS PRN
Qty: 18 TABLET | Refills: 0 | Status: SHIPPED | OUTPATIENT
Start: 2020-02-16 | End: 2021-05-13

## 2020-02-16 RX ORDER — METHYLPREDNISOLONE 4 MG/1
TABLET ORAL
Qty: 1 PACKAGE | Refills: 0 | Status: SHIPPED | OUTPATIENT
Start: 2020-02-16 | End: 2020-03-08

## 2020-02-16 RX ORDER — HYDROCODONE BITARTRATE AND ACETAMINOPHEN 5; 325 MG/1; MG/1
1 TABLET ORAL
Status: COMPLETED | OUTPATIENT
Start: 2020-02-16 | End: 2020-02-16

## 2020-02-16 RX ORDER — KETOROLAC TROMETHAMINE 30 MG/ML
30 INJECTION, SOLUTION INTRAMUSCULAR; INTRAVENOUS
Status: DISCONTINUED | OUTPATIENT
Start: 2020-02-16 | End: 2020-02-16

## 2020-02-16 RX ORDER — POTASSIUM CHLORIDE 20 MEQ/1
40 TABLET, EXTENDED RELEASE ORAL ONCE
Status: COMPLETED | OUTPATIENT
Start: 2020-02-16 | End: 2020-02-16

## 2020-02-16 RX ORDER — KETOROLAC TROMETHAMINE 30 MG/ML
15 INJECTION, SOLUTION INTRAMUSCULAR; INTRAVENOUS
Status: COMPLETED | OUTPATIENT
Start: 2020-02-16 | End: 2020-02-16

## 2020-02-16 RX ADMIN — HYDROCODONE BITARTRATE AND ACETAMINOPHEN 1 TABLET: 5; 325 TABLET ORAL at 10:02

## 2020-02-16 RX ADMIN — KETOROLAC TROMETHAMINE 15 MG: 30 INJECTION, SOLUTION INTRAMUSCULAR at 10:02

## 2020-02-16 RX ADMIN — POTASSIUM CHLORIDE 40 MEQ: 1500 TABLET, EXTENDED RELEASE ORAL at 12:02

## 2020-02-16 NOTE — ED PROVIDER NOTES
Encounter Date: 2/16/2020       History     Chief Complaint   Patient presents with    Knee Pain     c/o bilateral knee and wrist pain since yesterday, denies trauma or fall    Back Pain    Wrist Pain     29-year-old female who has a benign past medical history, presents emergency room with a history that yesterday she began with initially bilateral knee pain. No trauma changes in normal activities.  The patient works as a .  She states that since that time she has had progressive joint involvement involving both ankles and wrist at this time.  No history any fever.  No changes in activity.  She denies any complaints of any dysuria and denies any vaginal discharge. She denies that her joints have become swollen.  No prior history of any connective tissue disease.  No family history of such.        Review of patient's allergies indicates:   Allergen Reactions    Penicillins Hives     History reviewed. No pertinent past medical history.  Past Surgical History:   Procedure Laterality Date    CHOLECYSTECTOMY      gallblader removal      LIPOSUCTION OF ABDOMEN       Family History   Problem Relation Age of Onset    Hypertension Mother     Heart disease Father     Diabetes Father     Prostate cancer Father     Cancer Father         prostate    Cancer Maternal Grandmother     Cancer Maternal Grandfather     Heart disease Paternal Grandmother     Heart disease Paternal Grandfather      Social History     Tobacco Use    Smoking status: Never Smoker    Smokeless tobacco: Never Used   Substance Use Topics    Alcohol use: Yes     Alcohol/week: 1.0 standard drinks     Types: 1 Glasses of wine per week     Frequency: 2-4 times a month     Drinks per session: 1 or 2     Binge frequency: Never     Comment: 1-2x/week    Drug use: No     Review of Systems   Constitutional: Negative for activity change, chills, diaphoresis and fever.   HENT: Negative.  Negative for sore throat.    Eyes: Negative.     Respiratory: Negative.  Negative for shortness of breath.    Cardiovascular: Negative.  Negative for chest pain.   Gastrointestinal: Negative.  Negative for nausea.   Genitourinary: Negative.  Negative for dysuria.   Musculoskeletal: Positive for arthralgias. Negative for back pain, joint swelling and myalgias.   Skin: Negative for rash.   Neurological: Negative for dizziness, weakness and headaches.   Hematological: Does not bruise/bleed easily.   All other systems reviewed and are negative.      Physical Exam     Initial Vitals [02/16/20 0751]   BP Pulse Resp Temp SpO2   (!) 133/91 90 16 98.1 °F (36.7 °C) 99 %      MAP       --         Physical Exam    Constitutional: She appears well-developed and well-nourished. She is not diaphoretic. No distress.   HENT:   Head: Normocephalic and atraumatic.   Nose: Nose normal.   Mouth/Throat: Oropharynx is clear and moist. No oropharyngeal exudate.   Eyes: Conjunctivae are normal. Pupils are equal, round, and reactive to light. Right eye exhibits no discharge. Left eye exhibits no discharge. No scleral icterus.   Neck: Normal range of motion. Neck supple. No thyromegaly present. No JVD present.   Cardiovascular: Normal rate, regular rhythm, normal heart sounds and intact distal pulses. Exam reveals no gallop and no friction rub.    No murmur heard.  Pulmonary/Chest: Breath sounds normal. No respiratory distress. She has no wheezes. She has no rhonchi. She has no rales.   Abdominal: Soft. Bowel sounds are normal. She exhibits no distension. There is no tenderness. There is no rebound and no guarding.   Musculoskeletal: She exhibits no edema or tenderness.   Mildly painful but full active and passive range of motion to joints   Lymphadenopathy:     She has no cervical adenopathy.   Neurological: She is alert and oriented to person, place, and time. She has normal strength. She displays normal reflexes. No cranial nerve deficit or sensory deficit. GCS score is 15. GCS eye  subscore is 4. GCS verbal subscore is 5. GCS motor subscore is 6.   Skin: Skin is warm and dry. Capillary refill takes less than 2 seconds. No rash noted. No erythema. No pallor.   Psychiatric: She has a normal mood and affect. Her behavior is normal. Judgment and thought content normal.         ED Course   Procedures  Labs Reviewed   CBC W/ AUTO DIFFERENTIAL   COMPREHENSIVE METABOLIC PANEL   SEDIMENTATION RATE   C-REACTIVE PROTEIN   RHEUMATOID FACTOR   URIC ACID   POCT URINE PREGNANCY          Imaging Results    None                       Attending Attestation:             Attending ED Notes:   This 32-year-old female who is otherwise been an ex the since yesterday.  At this time she has a mildly elevated sed rate but a normal CRP.  CBC and chemistries unremarkable. Uric acid level is normal. A rheumatoid factor is pending as an outpatient.  She will be referred follow-up to Rheumatology but in the interim will be placed on NSAIDs and analgesics.                        Clinical Impression:       ICD-10-CM ICD-9-CM   1. Connective tissue disease M35.9 710.9                             De Parikh Jr., MD  02/16/20 1104

## 2020-02-18 LAB — RHEUMATOID FACT SERPL-ACNC: <10 IU/ML (ref 0–13.9)

## 2020-02-21 ENCOUNTER — DOCUMENTATION ONLY (OUTPATIENT)
Dept: FAMILY MEDICINE | Facility: CLINIC | Age: 30
End: 2020-02-21

## 2020-02-21 NOTE — PROGRESS NOTES
Pre-Visit Chart Review  For Appointment Scheduled on 2/24/2020    Health Maintenance Due   Topic Date Due    Lipid Panel  1989    TETANUS VACCINE  12/01/2007

## 2020-02-24 ENCOUNTER — HOSPITAL ENCOUNTER (OUTPATIENT)
Dept: RADIOLOGY | Facility: CLINIC | Age: 30
Discharge: HOME | End: 2020-02-24
Attending: FAMILY MEDICINE
Payer: COMMERCIAL

## 2020-02-24 ENCOUNTER — OFFICE VISIT (OUTPATIENT)
Dept: FAMILY MEDICINE | Facility: CLINIC | Age: 30
End: 2020-02-24
Payer: COMMERCIAL

## 2020-02-24 VITALS
DIASTOLIC BLOOD PRESSURE: 60 MMHG | WEIGHT: 170.19 LBS | BODY MASS INDEX: 25.79 KG/M2 | TEMPERATURE: 99 F | HEART RATE: 80 BPM | HEIGHT: 68 IN | SYSTOLIC BLOOD PRESSURE: 100 MMHG | OXYGEN SATURATION: 98 %

## 2020-02-24 DIAGNOSIS — M25.50 ARTHRALGIA OF MULTIPLE SITES: Primary | ICD-10-CM

## 2020-02-24 DIAGNOSIS — M25.571 ACUTE BILATERAL ANKLE PAIN: ICD-10-CM

## 2020-02-24 DIAGNOSIS — M25.572 ACUTE BILATERAL ANKLE PAIN: ICD-10-CM

## 2020-02-24 DIAGNOSIS — M25.531 BILATERAL WRIST PAIN: ICD-10-CM

## 2020-02-24 DIAGNOSIS — M54.50 LUMBAR BACK PAIN: ICD-10-CM

## 2020-02-24 DIAGNOSIS — R20.0 FINGER NUMBNESS: ICD-10-CM

## 2020-02-24 DIAGNOSIS — M25.532 BILATERAL WRIST PAIN: ICD-10-CM

## 2020-02-24 DIAGNOSIS — M25.561 ACUTE PAIN OF BOTH KNEES: ICD-10-CM

## 2020-02-24 DIAGNOSIS — M25.562 ACUTE PAIN OF BOTH KNEES: ICD-10-CM

## 2020-02-24 PROCEDURE — 72110 X-RAY EXAM L-2 SPINE 4/>VWS: CPT | Mod: TC,FY,PO

## 2020-02-24 PROCEDURE — 73110 XR WRIST COMPLETE 3 VIEWS BILATERAL: ICD-10-PCS | Mod: 26,S$GLB,, | Performed by: RADIOLOGY

## 2020-02-24 PROCEDURE — 73110 X-RAY EXAM OF WRIST: CPT | Mod: 26,S$GLB,, | Performed by: RADIOLOGY

## 2020-02-24 PROCEDURE — 73610 XR ANKLE COMPLETE 3 VIEW BILATERAL: ICD-10-PCS | Mod: 26,S$GLB,, | Performed by: RADIOLOGY

## 2020-02-24 PROCEDURE — 72110 XR LUMBAR SPINE COMPLETE 5 VIEW: ICD-10-PCS | Mod: 26,,, | Performed by: RADIOLOGY

## 2020-02-24 PROCEDURE — 72110 X-RAY EXAM L-2 SPINE 4/>VWS: CPT | Mod: 26,,, | Performed by: RADIOLOGY

## 2020-02-24 PROCEDURE — 3008F BODY MASS INDEX DOCD: CPT | Mod: CPTII,S$GLB,, | Performed by: FAMILY MEDICINE

## 2020-02-24 PROCEDURE — 73562 XR KNEE ORTHO BILAT: ICD-10-PCS | Mod: 26,S$GLB,, | Performed by: RADIOLOGY

## 2020-02-24 PROCEDURE — 73610 X-RAY EXAM OF ANKLE: CPT | Mod: TC,50,FY,PO

## 2020-02-24 PROCEDURE — 99214 PR OFFICE/OUTPT VISIT, EST, LEVL IV, 30-39 MIN: ICD-10-PCS | Mod: S$GLB,,, | Performed by: FAMILY MEDICINE

## 2020-02-24 PROCEDURE — 73562 X-RAY EXAM OF KNEE 3: CPT | Mod: TC,50,FY,PO

## 2020-02-24 PROCEDURE — 99999 PR PBB SHADOW E&M-EST. PATIENT-LVL IV: ICD-10-PCS | Mod: PBBFAC,,, | Performed by: FAMILY MEDICINE

## 2020-02-24 PROCEDURE — 3008F PR BODY MASS INDEX (BMI) DOCUMENTED: ICD-10-PCS | Mod: CPTII,S$GLB,, | Performed by: FAMILY MEDICINE

## 2020-02-24 PROCEDURE — 73110 X-RAY EXAM OF WRIST: CPT | Mod: TC,50,FY,PO

## 2020-02-24 PROCEDURE — 99214 OFFICE O/P EST MOD 30 MIN: CPT | Mod: S$GLB,,, | Performed by: FAMILY MEDICINE

## 2020-02-24 PROCEDURE — 73562 X-RAY EXAM OF KNEE 3: CPT | Mod: 26,S$GLB,, | Performed by: RADIOLOGY

## 2020-02-24 PROCEDURE — 99999 PR PBB SHADOW E&M-EST. PATIENT-LVL IV: CPT | Mod: PBBFAC,,, | Performed by: FAMILY MEDICINE

## 2020-02-24 PROCEDURE — 73610 X-RAY EXAM OF ANKLE: CPT | Mod: 26,S$GLB,, | Performed by: RADIOLOGY

## 2020-02-24 RX ORDER — DICLOFENAC SODIUM 10 MG/G
2 GEL TOPICAL 4 TIMES DAILY
Qty: 100 G | Refills: 3 | Status: SHIPPED | OUTPATIENT
Start: 2020-02-24 | End: 2020-04-03

## 2020-02-24 NOTE — PROGRESS NOTES
Subjective:       Patient ID: Keara Higgins is a 29 y.o. female.    Chief Complaint: Establish Care    HPI    Presents to clinic to establish care.     Has been having issues with her joints. States that both ankles, knees, wrists, and hands have been hurting. States that her hands are really weak. Going on for the last two weeks. Getting worse. Hot showers or resting helps. Cold or overuse makes it worse. Feels stabbing shooting pain from back to legs also throbbing. Elbows have shooting pain as well. Wrists and hands feel like throbbing and numb. No falls. Swelling everyday. Denies any fam hx of autoimmune issue. Has chronic back pain.     Headaches for about a year. Denies family history of migraines. Feels like stabbing. Happens behind eyes and temples. Occur three times a week. 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 States that she sees black dots (not associated with headache). Has been using tylenol which doesn't help. Chiropractor helps with neck exercises. Going on for about three months.     History reviewed. No pertinent past medical history.    Past Surgical History:   Procedure Laterality Date    CHOLECYSTECTOMY      gallblader removal      LIPOSUCTION OF ABDOMEN         Family History   Problem Relation Age of Onset    Hypertension Mother     Heart disease Father     Diabetes Father     Prostate cancer Father     Cancer Father         prostate    Cancer Maternal Grandmother     Cancer Maternal Grandfather     Heart disease Paternal Grandmother     Heart disease Paternal Grandfather        Social History     Tobacco Use    Smoking status: Never Smoker    Smokeless tobacco: Never Used   Substance Use Topics    Alcohol use: Yes     Alcohol/week: 1.0 standard drinks     Types: 1 Glasses of wine per week     Frequency: 2-4 times a month     Drinks per session: 1 or 2     Binge frequency: Never     Comment: 1-2x/week    Drug use: No       Social History     Substance  "and Sexual Activity   Sexual Activity Yes    Partners: Male    Birth control/protection: None          Current Outpatient Medications:     HYDROcodone-acetaminophen (NORCO) 5-325 mg per tablet, Take 1 tablet by mouth every 4 (four) hours as needed., Disp: 18 tablet, Rfl: 0    loratadine (CLARITIN) 10 mg tablet, Take 1 tablet (10 mg total) by mouth once daily. (Patient not taking: Reported on 9/24/2019), Disp: , Rfl: 0    methylPREDNISolone (MEDROL DOSEPACK) 4 mg tablet, As directed, Disp: 1 Package, Rfl: 0     Review of patient's allergies indicates:   Allergen Reactions    Penicillins Hives            Review of Systems   Constitutional: Negative for activity change and unexpected weight change.   HENT: Negative for hearing loss, rhinorrhea and trouble swallowing.    Eyes: Positive for visual disturbance. Negative for discharge.   Respiratory: Negative for chest tightness and wheezing.    Cardiovascular: Negative for chest pain and palpitations.   Gastrointestinal: Negative for blood in stool, constipation, diarrhea and vomiting.   Endocrine: Negative for polydipsia and polyuria.   Genitourinary: Negative for difficulty urinating, dysuria, hematuria and menstrual problem.   Musculoskeletal: Positive for arthralgias, joint swelling and neck pain.   Neurological: Positive for weakness and headaches.   Psychiatric/Behavioral: Negative for confusion and dysphoric mood.           Objective:          Vitals:    02/24/20 1406   BP: 100/60   Pulse: 80   Temp: 98.8 °F (37.1 °C)   SpO2: 98%   Weight: 77.2 kg (170 lb 3.1 oz)   Height: 5' 8" (1.727 m)       Physical Exam   Constitutional: She appears well-developed and well-nourished.   HENT:   Head: Normocephalic and atraumatic.   Eyes: Conjunctivae are normal.   Neck: Normal range of motion.   Cardiovascular: Normal rate, regular rhythm and normal heart sounds.   Pulmonary/Chest: Effort normal and breath sounds normal.   Abdominal: Soft. Bowel sounds are normal. " There is no tenderness.   Musculoskeletal: Normal range of motion.        Right hip: She exhibits normal strength and no tenderness.        Left hip: She exhibits normal strength and no tenderness.        Right hand: She exhibits no tenderness. Normal sensation noted.        Left hand: She exhibits no tenderness. Normal sensation noted.   Neurological: She is alert.   Skin: Skin is warm.   Psychiatric: She has a normal mood and affect. Her behavior is normal.   Vitals reviewed.              Assessment/Plan     Keara was seen today for establish care.    Diagnoses and all orders for this visit:    Arthralgia of multiple sites  -     DOMINIC Screen w/Reflex; Future  -     diclofenac sodium (VOLTAREN) 1 % Gel; Apply 2 g topically 4 (four) times daily.    Lumbar back pain  -     X-Ray Lumbar Spine Complete 5 View; Future  -     diclofenac sodium (VOLTAREN) 1 % Gel; Apply 2 g topically 4 (four) times daily.    Acute bilateral ankle pain  -     X-Ray Ankle Complete Bilateral; Future  -     diclofenac sodium (VOLTAREN) 1 % Gel; Apply 2 g topically 4 (four) times daily.    Acute pain of both knees  -     X-ray Knee Ortho Bilateral; Future  -     diclofenac sodium (VOLTAREN) 1 % Gel; Apply 2 g topically 4 (four) times daily.    Bilateral wrist pain  -     X-Ray Wrist Complete Bilateral; Future  -     diclofenac sodium (VOLTAREN) 1 % Gel; Apply 2 g topically 4 (four) times daily.    Finger numbness  -     EMG W/ ULTRASOUND AND NERVE CONDUCTION TEST 2 Extremities; Future      Told to f/u with eye doctor for dots in eyes. Could try otc excedrin for headaches.   F/u next month to monitor joint pain    Future Appointments   Date Time Provider Department Center   3/6/2020  7:00 AM Bertha Berry DO [id number] [place]   3/6/2020  2:20 PM DAISHA Benitez [facility] MED [place]   3/27/2020  1:20 PM MD MOR Nesbitt [facility] [place]           Amaya Fish MD  [facility]

## 2020-02-24 NOTE — PATIENT INSTRUCTIONS
Migraine Headache  This often severe type of headache is different from other types of headaches in that symptoms other than pain occur with the headache. Nausea and vomiting, lightheadedness, sensitivity to light (photophobia), and other visual disturbances are common migraine symptoms. The pain may last from a few hours to several days. It is not clear why migraines occur but certain factors called triggers can raise the risk of having a migraine attack. A migraine may be triggered by emotional stress or depression, or by hormone changes during the menstrual cycle. Other triggers include birth control pills, overuse of migraine medicines, alcohol or caffeine, foods with tyramine (such as aged cheese and wine), eyestrain, weather changes, missed meals, or too little or too much sleep.  Home care  Follow these tips when taking care of yourself at home:  · Dont drive yourself home if you were given pain medicine for your headache or are having visual symptoms. Instead, have someone else drive you home. Try to sleep when you get home. You should feel much better when you wake up.  · Cold can help ease migraine symptoms. Put an ice pack on your forehead or at the base of your skull. Put heat on the back of your neck to help ease any neck spasm.  · Drink only clear liquids or eat a light diet until your symptoms get better. This will help you avoid nausea and vomiting.  How to prevent migraines  Pay attention to what seems to trigger your headache. Try to avoid the triggers when you can. If you have frequent headaches, consider keeping a headache diary. In it, write down what you were doing, feeling, or eating in the hours before each headache. Show this to your healthcare provider to help find the cause of your headaches.  If stress seems to be a trigger for your headaches, figure out what is causing stress in your life. Learn new ways to handle your stress. Ideas include regular exercise, biofeedback,  self-hypnosis, yoga, and meditation. Talk with your healthcare provider to find out more information about managing stress. Many books and digital media are also available on this subject.  Tyramine is a substance found in many foods. It can trigger a migraine in some people. These foods contain tyramine:  · Chocolate  · Yogurt  · All cheeses, but especially aged cheeses  · Smoked or pickled fish and meat, including herring, caviar, bologna, pepperoni, and salami  · Liver  · Avocados  · Bananas  · Figs  · Raisins  · Red wine  Try staying away from these foods for 1 to 2 months to see if you have fewer headaches.  How to treat future headaches  · Take time out at the first sign of a headache, if possible. Find a quiet, dark, comfortable place to sit or lie down. Let yourself relax or sleep.  · Put an ice pack on your forehead or on the area of greatest pain. A heating pad and massage may help if you are having a muscle spasm and tightness in your neck.  · If you have been prescribed a medicine to stop a migraine headache, use this at the first warning sign of the headache for best results. First signs may be an aura or pain.  · If you need to take medicine often for your migraine, talk with your healthcare provider about other ways to prevent your headaches.  Follow-up care  Follow up with your healthcare provider, or as advised. Talk with your provider if you have frequent headaches. He or she can figure out a treatment plan. Ask if you can have medicine to take at home the next time you get a bad headache. This may keep you from having to visit the emergency department in the future. You may need to see a headache specialist (neurologist) if you continue to have headaches.  When to seek medical advice  Call your healthcare provider right away if any of these occur:  · Your head pain gets worse, or doesnt get better within 24 hours  · You cant keep liquids down (repeated vomiting)  · Pain in your sinuses, ears, or  throat  · Fever of 100.4º F (38º C) or higher, or as directed by your healthcare provider  · Stiff neck  · Extreme drowsiness, confusion, or fainting  · Dizziness, or dizziness with spinning sensation (vertigo)  · Weakness in an arm or leg, or on one side of your face  · Difficulty talking or seeing  Date Last Reviewed: 8/1/2016 © 2000-2017 [employer]. [address] [place] [address], [place] [address]. All rights reserved. This information is not intended as a substitute for professional medical care. Always follow your healthcare professional's instructions.        Preventing Migraine Headaches: Medicines and Lifestyle Changes     Going to bed and getting up at the same time each day, including weekends, may help prevent migraines.     A migraine is a type of severe headache. Having a migraine can be very painful. But there are steps you can take to help prevent migraines.  Medicines to help prevent migraines  · Your healthcare provider may prescribe certain medicines to help prevent migraines. These medicines may need to be taken daily. Or they may only need to be taken at times when youre likely to have a migraine.  · Common medicines used to help prevent migraines include:  ¨ Triptans (serotonin receptor agonists)  ¨ Nonsteroidal anti-inflammatory drugs (available over-the-counter)  ¨ Beta-blockers  ¨ Anticonvulsants  ¨ Tricyclic antidepressants  ¨ Calcium channel blockers  ¨ Certain vitamins, minerals, and plant extracts  ¨ Botulinum toxin injection (Botox) for certain chronic migraines   ¨ CGRP (calcitonin gene-related peptide) agnonists are being reviewed by the Food and Drug Administration (FDA)  Lifestyle changes for long-term prevention  Here are some suggestions:  · Exercise. Regular exercise can help prevent migraines and improve your health. (If exercise triggers your migraines, talk to your healthcare provider.)  · Keep regular habits. Dont skip or delay meals. Drink plenty of water. And go  to bed and get up at about the same time each day. This includes weekends.  · Try alternative treatments. These are treatments that do not involve the use of medicines or surgery. They may help relieve symptoms and prevent migraines. Some treatment options include biofeedback and acupuncture. Ask your healthcare provider to tell you more about these treatments if you have questions.  · Limit caffeine. You may find that caffeine helps relieve pain during an attack. But too much caffeine can also trigger migraines. So, limit the amount of caffeine you consume.  Date Last Reviewed: 10/11/2015  © 1801-5696 [employer]. [address] [place] [address]. All rights reserved. This information is not intended as a substitute for professional medical care. Always follow your healthcare professional's instructions.        Acetaminophen; Aspirin, ASA; Caffeine tablets, caplets, or geltabs  What is this medicine?  ACETAMINOPHEN; ASPIRIN; CAFFEINE (a set a VICKY olayinka fen; AS pir in; KAF een) is a pain reliever. It is used to treat mild aches and pains. This medicine may help with arthritis, colds, headache (including migraine), muscle aches, menstrual cramps, sinusitis, and toothache.  How should I use this medicine?  Take this medicine by mouth with a glass of water. Follow the directions on the package or prescription label. You can take this medicine with or without food. If it upsets your stomach, take it with food. Take your medicine at regular intervals. Do not take your medicine more often than directed.  Talk to your pediatrician regarding the use of this medicine in children. While this drug may be prescribed for children as young as 12 years for selected conditions, precautions do apply.  Patients over 65 years old may have a stronger reaction and need a smaller dose.  What side effects may I notice from receiving this medicine?  Side effects that you should report to your doctor or health care  professional as soon as possible:  · allergic reactions like skin rash, itching or hives, swelling of the face, lips, or tongue  · breathing problems  · fast, irregular heartbeat  · feeling faint or lightheaded, falls  · fever or sore throat  · pain on swallowing  · ringing in the ears or trouble hearing  · signs and symptoms of bleeding such as bloody or black, tarry stools; red or dark-brown urine; spitting up blood or brown material that looks like coffee grounds; red spots on the skin; unusual bruising or bleeding from the eye, gums, or nose  · trouble passing urine or change in the amount of urine  · unusually weak or tired  · yellowing of the eyes or skin  Side effects that usually do not require medical attention (report to your doctor or health care professional if they continue or are bothersome):  · diarrhea  · headache  · nausea, vomiting  · passing urine more often  · trouble sleeping  What may interact with this medicine?  Do not take this medicine with any of the following medications:  · MAOIs like Carbex, Eldepryl, Marplan, Nardil, and Parnate  · methotrexate  · other medicines with acetaminophen  · probenecid  This medicine may also interact with the following medications:  · alcohol  · alendronate  · bismuth subsalicylate  · clozapine  · flavocoxid  · grapefruit juice  · herbal supplements like feverfew, garlic, natalie, ginkgo biloba, horse chestnut  · isoniazid  · lithium  · medicines for diabetes or glaucoma like acetazolamide, methazolamide  · medicines for gout  · medicines that stimulate or keep you awake  · medicines that treat or prevent blood clots like enoxaparin, heparin, ticlopidine, warfarin  · NSAIDs, medicines for pain and inflammation, like ibuprofen or naproxen  · other aspirin and aspirin-like medicines  · some cough and cold medicines like pseudoephedrine  · varicella live vaccine  What if I miss a dose?  If you miss a dose, take it as soon as you can. If it is almost time for  your next dose, take only that dose. Do not take double or extra doses.  Where should I keep my medicine?  Keep out of the reach of children.  Store at room temperature between 15 and 30 degrees C (59 and 86 degrees F). Keep container tightly closed. Throw away any unused medicine after the expiration date.  What should I tell my health care provider before I take this medicine?  They need to know if you have any of these conditions:  · anemia  · anxiety or panic attacks  · asthma  · bleeding problems  · child with chickenpox, the flu, or other viral infection  · diabetes  · gout  · heart disease  · high blood pressure  · if you often drink alcohol  · kidney disease  · liver disease  · low level of vitamin K  · lupus  · smoke tobacco  · stomach ulcers or other problems  · trouble sleeping  · an unusual or allergic reaction to acetaminophen, aspirin, caffeine, other medicines, foods, dyes, or preservatives  · pregnant or trying to get pregnant  · breast-feeding  What should I watch for while using this medicine?  Tell your doctor or health care professional if the pain lasts more than 10 days, if it gets worse, or if there is a new or different kind of pain. Tell your doctor if you see redness or swelling. If you are treating a fever, check with your doctor if the fever that lasts for more than 3 days.  Do not take Tylenol (acetaminophen) or medicines that have acetaminophen with this medicine. Too much acetaminophen can be very dangerous. Always read medicine labels carefully.  Report any possible overdose to your doctor or health care professional right away, even if there are no symptoms. The effects of extra doses may not be seen for many days.  This medicine can irritate your stomach or cause bleeding problems. Do not smoke cigarettes or drink alcohol. Do not lie down for 30 minutes after taking this medicine to prevent irritation to your throat.  If you are scheduled for any medical or dental procedure, tell  your healthcare provider that you are taking this medicine. You may need to stop taking this medicine before the procedure.  Do not take this medicine close to bedtime. It may prevent you from sleeping.  This medicine may be used to treat migraines. If you take migraine medicines for 10 or more days a month, your migraines may get worse. Keep a diary of headache days and medicine use. Contact your healthcare professional if your migraine attacks occur more frequently.  NOTE:This sheet is a summary. It may not cover all possible information. If you have questions about this medicine, talk to your doctor, pharmacist, or health care provider. Copyright© 2017 [employer]

## 2020-02-27 ENCOUNTER — TELEPHONE (OUTPATIENT)
Dept: FAMILY MEDICINE | Facility: CLINIC | Age: 30
End: 2020-02-27

## 2020-02-27 NOTE — TELEPHONE ENCOUNTER
Patient states she has flonase and rx from previous visit nasal spray, and tried mucinex DM, has not improved. Patient told she would need to be seen for prescriptions, appointment made for next week with Main Mock NP

## 2020-02-27 NOTE — TELEPHONE ENCOUNTER
Patient notified of normal lab result. While on the phone she is asking if something can be called in for her cough and mucus she has had for the last few days. States it is a lot of sinus drainage and coughing up phloem.

## 2020-02-27 NOTE — TELEPHONE ENCOUNTER
What all has she tried. If she is congested she can try mucinex dm, flonase, and if she has allergies she can take an antihistamine. Can also do home remidies like steam showers, hot tea with honey and lemon, etc.

## 2020-02-28 ENCOUNTER — PROCEDURE VISIT (OUTPATIENT)
Dept: PHYSICAL MEDICINE AND REHAB | Facility: CLINIC | Age: 30
End: 2020-02-28
Payer: COMMERCIAL

## 2020-02-28 DIAGNOSIS — R20.0 FINGER NUMBNESS: ICD-10-CM

## 2020-02-28 PROCEDURE — 95886 PR EMG COMPLETE, W/ NERVE CONDUCTION STUDIES, 5+ MUSCLES: ICD-10-PCS | Mod: S$GLB,,, | Performed by: PHYSICAL MEDICINE & REHABILITATION

## 2020-02-28 PROCEDURE — 95910 NRV CNDJ TEST 7-8 STUDIES: CPT | Mod: S$GLB,,, | Performed by: PHYSICAL MEDICINE & REHABILITATION

## 2020-02-28 PROCEDURE — 95910 PR NERVE CONDUCTION STUDY; 7-8 STUDIES: ICD-10-PCS | Mod: S$GLB,,, | Performed by: PHYSICAL MEDICINE & REHABILITATION

## 2020-02-28 PROCEDURE — 95886 MUSC TEST DONE W/N TEST COMP: CPT | Mod: S$GLB,,, | Performed by: PHYSICAL MEDICINE & REHABILITATION

## 2020-03-26 ENCOUNTER — TELEPHONE (OUTPATIENT)
Dept: FAMILY MEDICINE | Facility: CLINIC | Age: 30
End: 2020-03-26

## 2020-03-27 ENCOUNTER — OFFICE VISIT (OUTPATIENT)
Dept: FAMILY MEDICINE | Facility: CLINIC | Age: 30
End: 2020-03-27
Payer: COMMERCIAL

## 2020-03-27 DIAGNOSIS — G56.01 CARPAL TUNNEL SYNDROME OF RIGHT WRIST: Primary | ICD-10-CM

## 2020-03-27 DIAGNOSIS — M25.50 POLYARTHRALGIA: ICD-10-CM

## 2020-03-27 PROCEDURE — 99213 PR OFFICE/OUTPT VISIT, EST, LEVL III, 20-29 MIN: ICD-10-PCS | Mod: 95,,, | Performed by: FAMILY MEDICINE

## 2020-03-27 PROCEDURE — 99213 OFFICE O/P EST LOW 20 MIN: CPT | Mod: 95,,, | Performed by: FAMILY MEDICINE

## 2020-03-27 NOTE — PROGRESS NOTES
Subjective:       Patient ID: Keara Higgins is a 29 y.o. female.    Chief Complaint: Follow-up    HPI     Patient evaluated throug telemedicine at her house.     F/u of body aches. Discussed Xrays and told that there was no acute findings. Tried the voltaren gel but broke out in a rash so she couldn't use it for long. States that over time her joint pain has improved slightly. NCS did show some carpal tunnel. Given braces for her hands. Doesn't help much. Will be seeing rheum for joint pain soon.     Past Medical History:   Diagnosis Date    Allergic rhinitis with postnasal drip 5/27/2019    Anxiety     Chronic cough 4/12/2019    Eczema 4/12/2019       Past Surgical History:   Procedure Laterality Date    CHOLECYSTECTOMY      gallblader removal      LIPOSUCTION OF ABDOMEN         Family History   Problem Relation Age of Onset    Hypertension Mother     Heart disease Father     Diabetes Father     Prostate cancer Father     Cancer Father         prostate    Cancer Maternal Grandmother     Cancer Maternal Grandfather     Heart disease Paternal Grandmother     Heart disease Paternal Grandfather        Social History     Tobacco Use    Smoking status: Never Smoker    Smokeless tobacco: Never Used   Substance Use Topics    Alcohol use: Yes     Alcohol/week: 1.0 standard drinks     Types: 1 Glasses of wine per week     Frequency: 2-4 times a month     Drinks per session: 1 or 2     Binge frequency: Never     Comment: 1-2x/week    Drug use: No       Social History     Substance and Sexual Activity   Sexual Activity Yes    Partners: Male    Birth control/protection: None          Current Outpatient Medications:     diclofenac sodium (VOLTAREN) 1 % Gel, Apply 2 g topically 4 (four) times daily., Disp: 100 g, Rfl: 3    HYDROcodone-acetaminophen (NORCO) 5-325 mg per tablet, Take 1 tablet by mouth every 4 (four) hours as needed., Disp: 18 tablet, Rfl: 0    loratadine (CLARITIN) 10 mg tablet, Take 1  tablet (10 mg total) by mouth once daily. (Patient not taking: Reported on 9/24/2019), Disp: , Rfl: 0     Review of patient's allergies indicates:   Allergen Reactions    Penicillins Hives            Review of Systems   Constitutional: Negative for activity change and unexpected weight change.   HENT: Negative for hearing loss, rhinorrhea and trouble swallowing.    Eyes: Negative for discharge and visual disturbance.   Respiratory: Negative for chest tightness and wheezing.    Cardiovascular: Negative for chest pain and palpitations.   Gastrointestinal: Negative for blood in stool, constipation, diarrhea and vomiting.   Endocrine: Negative for polydipsia and polyuria.   Genitourinary: Negative for difficulty urinating, dysuria, hematuria and menstrual problem.   Musculoskeletal: Positive for arthralgias and joint swelling. Negative for neck pain.   Neurological: Positive for headaches (Headaches have improved now that she started taking excedrin sporadically).   Psychiatric/Behavioral: Negative for confusion and dysphoric mood.           Objective:          Physical Exam   Constitutional: She appears well-developed and well-nourished.   HENT:   Head: Normocephalic and atraumatic.   Eyes: Conjunctivae are normal.   Pulmonary/Chest: Effort normal.   Neurological: She is alert.   Psychiatric: She has a normal mood and affect. Her behavior is normal.               Assessment/Plan     Keara was seen today for follow-up.    Diagnoses and all orders for this visit:    Carpal tunnel syndrome of right wrist  -     Ambulatory referral/consult to Orthopedics; Future    Polyarthralgia        - F/u with rheum      Follow up if symptoms worsen or fail to improve.    Future Appointments   Date Time Provider Department Center   5/21/2020  2:30 PM Bertha Berry DO [id number] Crystal Fish MD  [facility]

## 2020-04-03 ENCOUNTER — HOSPITAL ENCOUNTER (OUTPATIENT)
Dept: RADIOLOGY | Facility: HOSPITAL | Age: 30
Discharge: HOME | End: 2020-04-03
Attending: INTERNAL MEDICINE
Payer: COMMERCIAL

## 2020-04-03 ENCOUNTER — TELEPHONE (OUTPATIENT)
Dept: RHEUMATOLOGY | Facility: CLINIC | Age: 30
End: 2020-04-03

## 2020-04-03 ENCOUNTER — OFFICE VISIT (OUTPATIENT)
Dept: RHEUMATOLOGY | Facility: CLINIC | Age: 30
End: 2020-04-03
Payer: COMMERCIAL

## 2020-04-03 VITALS
HEIGHT: 68 IN | HEIGHT: 68 IN | HEART RATE: 74 BPM | BODY MASS INDEX: 26.34 KG/M2 | WEIGHT: 173.81 LBS | SYSTOLIC BLOOD PRESSURE: 134 MMHG | WEIGHT: 173.81 LBS | SYSTOLIC BLOOD PRESSURE: 134 MMHG | OXYGEN SATURATION: 99 % | TEMPERATURE: 98 F | HEART RATE: 74 BPM | DIASTOLIC BLOOD PRESSURE: 96 MMHG | DIASTOLIC BLOOD PRESSURE: 96 MMHG | OXYGEN SATURATION: 99 % | BODY MASS INDEX: 26.34 KG/M2

## 2020-04-03 DIAGNOSIS — M25.50 POLYARTHRALGIA: Primary | ICD-10-CM

## 2020-04-03 DIAGNOSIS — R70.0 ELEVATED SED RATE: ICD-10-CM

## 2020-04-03 DIAGNOSIS — M25.50 POLYARTHRALGIA: ICD-10-CM

## 2020-04-03 DIAGNOSIS — R04.0 NOSEBLEED: ICD-10-CM

## 2020-04-03 DIAGNOSIS — M54.50 MIDLINE LOW BACK PAIN WITHOUT SCIATICA, UNSPECIFIED CHRONICITY: ICD-10-CM

## 2020-04-03 PROCEDURE — 99999 PR PBB SHADOW E&M-EST. PATIENT-LVL IV: CPT | Mod: PBBFAC,,, | Performed by: INTERNAL MEDICINE

## 2020-04-03 PROCEDURE — 72040 X-RAY EXAM NECK SPINE 2-3 VW: CPT | Mod: TC,FY,PO

## 2020-04-03 PROCEDURE — 73521 X-RAY EXAM HIPS BI 2 VIEWS: CPT | Mod: 26,,, | Performed by: INTERNAL MEDICINE

## 2020-04-03 PROCEDURE — 73521 XR HIPS BILATERAL 2 VIEW INCL AP PELVIS: ICD-10-PCS | Mod: 26,,, | Performed by: INTERNAL MEDICINE

## 2020-04-03 PROCEDURE — 73521 X-RAY EXAM HIPS BI 2 VIEWS: CPT | Mod: TC,FY,PO

## 2020-04-03 PROCEDURE — 99999 PR PBB SHADOW E&M-EST. PATIENT-LVL IV: ICD-10-PCS | Mod: PBBFAC,,, | Performed by: INTERNAL MEDICINE

## 2020-04-03 PROCEDURE — 73130 XR HAND COMPLETE 3 VIEWS BILATERAL: ICD-10-PCS | Mod: 26,,, | Performed by: RADIOLOGY

## 2020-04-03 PROCEDURE — 3008F PR BODY MASS INDEX (BMI) DOCUMENTED: ICD-10-PCS | Mod: CPTII,S$GLB,, | Performed by: INTERNAL MEDICINE

## 2020-04-03 PROCEDURE — 99204 PR OFFICE/OUTPT VISIT, NEW, LEVL IV, 45-59 MIN: ICD-10-PCS | Mod: S$GLB,,, | Performed by: INTERNAL MEDICINE

## 2020-04-03 PROCEDURE — 72040 XR CERVICAL SPINE AP LATERAL: ICD-10-PCS | Mod: 26,,, | Performed by: RADIOLOGY

## 2020-04-03 PROCEDURE — 73130 X-RAY EXAM OF HAND: CPT | Mod: 26,,, | Performed by: RADIOLOGY

## 2020-04-03 PROCEDURE — 73130 X-RAY EXAM OF HAND: CPT | Mod: TC,50,FY,PO

## 2020-04-03 PROCEDURE — 72040 X-RAY EXAM NECK SPINE 2-3 VW: CPT | Mod: 26,,, | Performed by: RADIOLOGY

## 2020-04-03 PROCEDURE — 3008F BODY MASS INDEX DOCD: CPT | Mod: CPTII,S$GLB,, | Performed by: INTERNAL MEDICINE

## 2020-04-03 PROCEDURE — 99204 OFFICE O/P NEW MOD 45 MIN: CPT | Mod: S$GLB,,, | Performed by: INTERNAL MEDICINE

## 2020-04-03 RX ORDER — MELOXICAM 15 MG/1
15 TABLET ORAL DAILY
Qty: 30 TABLET | Refills: 2 | Status: SHIPPED | OUTPATIENT
Start: 2020-04-03 | End: 2021-10-22

## 2020-04-03 RX ORDER — MELOXICAM 15 MG/1
15 TABLET ORAL DAILY
Qty: 30 TABLET | Refills: 2 | Status: CANCELLED | OUTPATIENT
Start: 2020-04-03

## 2020-04-03 NOTE — PROGRESS NOTES
Subjective:       Patient ID: Keara Avila is a 29 y.o. female.    Chief Complaint: joint pain  HPI  Pt is a 29 year old female with PMH of chronic migraines who presented today for polyarthralgias of her hands, wrists and elbows.        Review of Systems      Objective:   There were no vitals taken for this visit.     Physical Exam     Results for KEARA AVILA (MRN [id number]) as of 4/3/2020 07:57   Ref. Range 2/16/2020 08:28 2/24/2020 14:56   DOMINIC Screen Latest Ref Range: Negative <1:80   Negative <1:80   Rheumatoid Factor Latest Ref Range: 0.0 - 13.9 IU/mL <10.0       Results for KEARA AVILA (MRN [id number]) as of 4/3/2020 07:57   Ref. Range 2/16/2020 08:28   WBC Latest Ref Range: 3.90 - 12.70 K/uL 10.23   RBC Latest Ref Range: 4.00 - 5.40 M/uL 4.31   Hemoglobin Latest Ref Range: 12.0 - 16.0 g/dL 12.7   Hematocrit Latest Ref Range: 37.0 - 48.5 % 39.1   MCV Latest Ref Range: 82 - 98 fL 91   MCH Latest Ref Range: 27.0 - 31.0 pg 29.5   MCHC Latest Ref Range: 32.0 - 36.0 g/dL 32.5   RDW Latest Ref Range: 11.5 - 14.5 % 11.9   Platelets Latest Ref Range: 150 - 350 K/uL 349   MPV Latest Ref Range: 9.2 - 12.9 fL 9.9   Gran% Latest Ref Range: 38.0 - 73.0 % 41.7   Gran # (ANC) Latest Ref Range: 1.8 - 7.7 K/uL 4.3   Lymph% Latest Ref Range: 18.0 - 48.0 % 49.8 (H)   Lymph # Latest Ref Range: 1.0 - 4.8 K/uL 5.1 (H)   Mono% Latest Ref Range: 4.0 - 15.0 % 6.0   Mono # Latest Ref Range: 0.3 - 1.0 K/uL 0.6   Eosinophil% Latest Ref Range: 0.0 - 8.0 % 1.9   Eos # Latest Ref Range: 0.0 - 0.5 K/uL 0.2   Basophil% Latest Ref Range: 0.0 - 1.9 % 0.3   Baso # Latest Ref Range: 0.00 - 0.20 K/uL 0.03   nRBC Latest Ref Range: 0 /100 WBC 0   Differential Method Unknown Automated   Immature Grans (Abs) Latest Ref Range: 0.00 - 0.04 K/uL 0.03   Immature Granulocytes Latest Ref Range: 0.0 - 0.5 % 0.3   Sed Rate Latest Ref Range: 0 - 20 mm/Hr 22 (H)   Sodium Latest Ref Range: 136 - 145 mmol/L 141   Potassium Latest Ref Range: 3.5 - 5.1  mmol/L 3.3 (L)   Chloride Latest Ref Range: 95 - 110 mmol/L 99   CO2 Latest Ref Range: 23 - 29 mmol/L 28   Anion Gap Latest Ref Range: 8 - 16 mmol/L 14   BUN, Bld Latest Ref Range: 6 - 20 mg/dL 13   Creatinine Latest Ref Range: 0.5 - 1.4 mg/dL 0.8   eGFR if non African American Latest Ref Range: >60 mL/min/1.73 m^2 >60.0   eGFR if African American Latest Ref Range: >60 mL/min/1.73 m^2 >60.0   Glucose Latest Ref Range: 70 - 110 mg/dL 105   Calcium Latest Ref Range: 8.7 - 10.5 mg/dL 8.9   Alkaline Phosphatase Latest Ref Range: 55 - 135 U/L 37 (L)   PROTEIN TOTAL Latest Ref Range: 6.0 - 8.4 g/dL 6.9   Albumin Latest Ref Range: 3.5 - 5.2 g/dL 3.6   Uric Acid Latest Ref Range: 2.4 - 5.7 mg/dL 4.8   BILIRUBIN TOTAL Latest Ref Range: 0.1 - 1.0 mg/dL 0.6   AST Latest Ref Range: 10 - 40 U/L 25   ALT Latest Ref Range: 10 - 44 U/L 33   CRP Latest Ref Range: 0.00 - 0.75 mg/dL 0.51     Xray of left wrist done 2/24/2020 independently reviewed showing no erosions.  Xray of b/l knees done 2/24/2020 independently reviewed showing no erosions.  Xray b/l ankles done 2/24/2020 independently reviewed showing no erosions.  Xray of lumbar spine done 2/24/2020 independently reviewed.  Assessment:       1. Polyarthralgia            Plan:       Problem List Items Addressed This Visit     None      Visit Diagnoses     Polyarthralgia    -  Primary        ***

## 2020-04-03 NOTE — TELEPHONE ENCOUNTER
----- Message from Apollo Forbes sent at 4/2/2020  5:51 PM CDT -----  Contact: [phone]/ self   Patient requesting to speak with you regarding her appointment that is scheduled. She says she was told that she can come to the appointment in person but is still scheduled for the video visit. Please call.

## 2020-04-03 NOTE — PROGRESS NOTES
Subjective:       Patient ID: Keara Higgins is a 29 y.o. female.    Chief Complaint: joint pain  HPI  Pt is a 29 year old female with PMH of chronic headaches and anxiety who presented for polyarthralgias in her wrists, hands and elbows.    Pt stated that her joint pain in her hands and wrists started about 2 months ago and has been getting worse, she has difficulty writing/typing and her pain shoots from her wrist to her elbow.  She describes the pain as a burning at time but mostly a constant aching.  Pt has had swelling of her hands and fingers as well.  Morning stiffness lasts in her wrist most of the day. Pt has history of right knee trauma while playing soccer in college.  Then on 2/16/2020 she woke up in the middle of the night she had aching, shooting, throbbing pain in all of her joints and back, she couldn't walk. She went to the hospital, she was given ketorolac which didn't help much and also a medrol dos pack which didn't help her pain.  Pt rates her pain today a 9/10.  She wears braces on her b/l wrists. She tries to keep her wrists moving, tylenol and hydrocodone have not helped with the pain.   No improvement with OTC NSAIDs.  Pt had rash with voltaren gel but no issues with oral NSAIDs.    Pt also has neck and back pain which started a few months ago as well.   Her lower back pain is better with activity, her neck pain wakes her from sleep.  No improvement with NSAIDS in her lower back pain. Pt has a side on her right lower back and right buttock, she finds she has to shift from side to side for her lower back pain.    Never smoker, occasional drinker, has 1-2 glasses of wine a week, no drug use.  Pt has a child, no h/o pre-eclampsia or eclampsia, no h/o miscarriages.  Pt is a federal . Patient denied family history of RA,  psoriasis, scleroderma, sjogrens, sarcoidosis, UC or Crohn's disease.  Great aunt may have had lupus.    Review of Systems   Constitutional: Positive for  "diaphoresis and fatigue. Negative for chills, fever and unexpected weight change.   HENT: Positive for nosebleeds. Negative for congestion, hearing loss, mouth sores, sore throat, trouble swallowing and voice change.         Had nose bleed last week, stopped after a few minutes. Does not typically get nose bleeds.   Eyes: Negative for photophobia, pain, redness and visual disturbance.   Respiratory: Negative for cough, chest tightness and shortness of breath.    Cardiovascular: Negative for chest pain and palpitations.   Gastrointestinal: Negative for abdominal pain, diarrhea, nausea and vomiting.   Genitourinary: Negative for difficulty urinating, dysuria, genital sores and hematuria.   Musculoskeletal: Positive for arthralgias, back pain, joint swelling and myalgias. Negative for neck pain and neck stiffness.   Skin: Negative for color change and rash.   Neurological: Positive for weakness and headaches. Negative for seizures and numbness. Tremors:           Weakness in the hands   Psychiatric/Behavioral: Negative for agitation, confusion and sleep disturbance. The patient is not nervous/anxious.          Objective:   BP (!) 134/96 (BP Location: Left arm, Patient Position: Sitting, BP Method: Medium (Automatic))   Pulse 74   Ht 5' 8" (1.727 m)   Wt 78.8 kg (173 lb 13.3 oz)   SpO2 99%   BMI 26.43 kg/m²      Physical Exam   Nursing note and vitals reviewed.  Constitutional: She is oriented to person, place, and time and well-developed, well-nourished, and in no distress. No distress.   HENT:   Head: Normocephalic and atraumatic.   Right Ear: External ear normal.   Left Ear: External ear normal.   Mouth/Throat: Oropharynx is clear and moist. No oropharyngeal exudate.   Eyes: Conjunctivae and EOM are normal. Right eye exhibits no discharge. Left eye exhibits no discharge. No scleral icterus.   Neck: Neck supple. No tracheal deviation present.   Cardiovascular: Normal rate, regular rhythm and normal heart sounds.  "   No murmur heard.  Pulmonary/Chest: Effort normal and breath sounds normal. No stridor. No respiratory distress. She has no wheezes. She has no rales.   Abdominal: Soft. Bowel sounds are normal. She exhibits no distension. There is no tenderness. There is no rebound.   Neurological: She is alert and oriented to person, place, and time.   Skin: Skin is warm and dry. No rash noted. She is not diaphoretic.     Psychiatric: Mood and affect normal.   Musculoskeletal: Normal range of motion. She exhibits tenderness. She exhibits no edema or deformity.   No active synovitis, enthesitis, dactylitis or effusion noted on exam      Tenderness b/l wrists           EMG done 2/28/2020:  Mild right carpal tunnel syndrome    Xray of b/l knees, lumbar spine and wrists done 2/24/2020 independently reviewed showing no erosions.  Assessment:       1. Polyarthralgia    2. Midline low back pain without sciatica, unspecified chronicity    3. Elevated sed rate    4. Nosebleed        Pt is a 29 year old female with PMH of chronic headaches and anxiety who presented for polyarthralgias in her wrists, hands and elbows. Pt with morning stiffness and b/l wrist tenderness on exam but no overt synovitis, will obtain further workup however atypical for an inflammatory arthritis to not respond to steroids/NSAIDs as she states per history.  DOMINIC and RF negative thus far with normal CRP and sed rate of 22 (normal 20 or less).    Plan:       -labs, urine and xrays as below  -meloxicam 15mg daily prescribed  -continue b/l braces at night.  -RTC in 4-6 weeks.  Problem List Items Addressed This Visit     None      Visit Diagnoses     Polyarthralgia    -  Primary    Relevant Medications    meloxicam (MOBIC) 15 MG tablet    Other Relevant Orders    Sjogrens syndrome-A extractable nuclear antibody    Cyclic Citrullinated Peptide Antibody, IgG    HIV-1 and HIV-2 antibodies    Hepatitis B surface antigen    HBcAB    Hepatitis B surface antibody    Hepatitis C  antibody    Hepatitis A antibody, IgG    Strongyloides IgG Antibodies    Quantiferon Gold TB    RPR    Varicella zoster antibody, IgG    CBC auto differential    CK    Comprehensive metabolic panel    C-Reactive Protein    Sedimentation rate    Urinalysis    Protein / creatinine ratio, urine    T4, free    TSH    Vitamin D    Protein electrophoresis, serum    Immunofixation electrophoresis    HLA B27 Antigen    X-Ray Hand 3 View Bilateral    X-Ray Hips Bilateral 2 View Inc AP Pelvis    Pregnancy, urine rapid (Completed)    Myeloperoxidase Antibody (MPO)    Proteinase 3 Autoantibodies    Anti-neutrophilic cytoplasmic antibody    X-Ray Cervical Spine AP And Lateral    Midline low back pain without sciatica, unspecified chronicity        Relevant Medications    meloxicam (MOBIC) 15 MG tablet    Other Relevant Orders    X-Ray Hips Bilateral 2 View Inc AP Pelvis    Elevated sed rate        Relevant Medications    meloxicam (MOBIC) 15 MG tablet    Other Relevant Orders    Sjogrens syndrome-A extractable nuclear antibody    Cyclic Citrullinated Peptide Antibody, IgG    HIV-1 and HIV-2 antibodies    Hepatitis B surface antigen    HBcAB    Hepatitis B surface antibody    Hepatitis C antibody    Hepatitis A antibody, IgG    Strongyloides IgG Antibodies    Quantiferon Gold TB    RPR    Varicella zoster antibody, IgG    CBC auto differential    CK    Comprehensive metabolic panel    C-Reactive Protein    Sedimentation rate    Urinalysis    Protein / creatinine ratio, urine    T4, free    TSH    Vitamin D    Protein electrophoresis, serum    Immunofixation electrophoresis    HLA B27 Antigen    X-Ray Hand 3 View Bilateral    X-Ray Hips Bilateral 2 View Inc AP Pelvis    Nosebleed        Relevant Orders    Myeloperoxidase Antibody (MPO)    Proteinase 3 Autoantibodies    Anti-neutrophilic cytoplasmic antibody

## 2020-04-07 ENCOUNTER — TELEPHONE (OUTPATIENT)
Dept: FAMILY MEDICINE | Facility: CLINIC | Age: 30
End: 2020-04-07

## 2020-04-07 NOTE — TELEPHONE ENCOUNTER
----- Message from Amaya Fish MD sent at 3/27/2020  1:46 PM CDT -----  Can we try and get her scheduled with ortho?

## 2020-05-04 ENCOUNTER — PATIENT OUTREACH (OUTPATIENT)
Dept: ADMINISTRATIVE | Facility: OTHER | Age: 30
End: 2020-05-04

## 2020-05-05 ENCOUNTER — OFFICE VISIT (OUTPATIENT)
Dept: ORTHOPEDICS | Facility: CLINIC | Age: 30
End: 2020-05-05
Payer: COMMERCIAL

## 2020-05-05 VITALS — RESPIRATION RATE: 16 BRPM | HEIGHT: 68 IN | BODY MASS INDEX: 26.22 KG/M2 | WEIGHT: 173 LBS

## 2020-05-05 DIAGNOSIS — G56.03 CARPAL TUNNEL SYNDROME, BILATERAL: Primary | ICD-10-CM

## 2020-05-05 PROCEDURE — 99204 OFFICE O/P NEW MOD 45 MIN: CPT | Mod: 25,S$GLB,, | Performed by: ORTHOPAEDIC SURGERY

## 2020-05-05 PROCEDURE — 76942 ECHO GUIDE FOR BIOPSY: CPT | Mod: S$GLB,,, | Performed by: ORTHOPAEDIC SURGERY

## 2020-05-05 PROCEDURE — 76942 CARPAL TUNNEL: R RADIOCARPAL: ICD-10-PCS | Mod: S$GLB,,, | Performed by: ORTHOPAEDIC SURGERY

## 2020-05-05 PROCEDURE — 20526 CARPAL TUNNEL: R RADIOCARPAL: ICD-10-PCS | Mod: RT,S$GLB,, | Performed by: ORTHOPAEDIC SURGERY

## 2020-05-05 PROCEDURE — 3008F PR BODY MASS INDEX (BMI) DOCUMENTED: ICD-10-PCS | Mod: CPTII,S$GLB,, | Performed by: ORTHOPAEDIC SURGERY

## 2020-05-05 PROCEDURE — 99999 PR PBB SHADOW E&M-EST. PATIENT-LVL III: ICD-10-PCS | Mod: PBBFAC,,, | Performed by: ORTHOPAEDIC SURGERY

## 2020-05-05 PROCEDURE — 20526 THER INJECTION CARP TUNNEL: CPT | Mod: RT,S$GLB,, | Performed by: ORTHOPAEDIC SURGERY

## 2020-05-05 PROCEDURE — 99999 PR PBB SHADOW E&M-EST. PATIENT-LVL III: CPT | Mod: PBBFAC,,, | Performed by: ORTHOPAEDIC SURGERY

## 2020-05-05 PROCEDURE — 99204 PR OFFICE/OUTPT VISIT, NEW, LEVL IV, 45-59 MIN: ICD-10-PCS | Mod: 25,S$GLB,, | Performed by: ORTHOPAEDIC SURGERY

## 2020-05-05 PROCEDURE — 3008F BODY MASS INDEX DOCD: CPT | Mod: CPTII,S$GLB,, | Performed by: ORTHOPAEDIC SURGERY

## 2020-05-05 RX ORDER — METHYLPREDNISOLONE ACETATE 40 MG/ML
40 INJECTION, SUSPENSION INTRA-ARTICULAR; INTRALESIONAL; INTRAMUSCULAR; SOFT TISSUE
Status: DISCONTINUED | OUTPATIENT
Start: 2020-05-05 | End: 2020-05-05 | Stop reason: HOSPADM

## 2020-05-05 RX ADMIN — METHYLPREDNISOLONE ACETATE 40 MG: 40 INJECTION, SUSPENSION INTRA-ARTICULAR; INTRALESIONAL; INTRAMUSCULAR; SOFT TISSUE at 08:05

## 2020-05-05 NOTE — PROCEDURES
Carpal Tunnel: R radiocarpal  Date/Time: 5/5/2020 8:45 AM  Performed by: Bipin Newman II, MD  Authorized by: Bipin Newman II, MD     Consent Done?:  Yes (Verbal)  Indications:  Pain  Timeout: prior to procedure the correct patient, procedure, and site was verified    Prep: patient was prepped and draped in usual sterile fashion      Location:  Wrist  Site:  R radiocarpal  Ultrasonic Guidance for Needle Placement?: Yes    Needle size:  22 G  Approach:  Volar  Medications:  40 mg methylPREDNISolone acetate 40 mg/mL  Patient tolerance:  Patient tolerated the procedure well with no immediate complications

## 2020-05-05 NOTE — PROGRESS NOTES
CC:  29-year-old female presents for evaluation of bilateral hand pain, right worse than left.  She did have a nerve conduction study done back in February that showed a moderate carpal tunnel syndrome on the right.  She reports pain in both hands that starts at the wrist and radiates into the thumb and index finger and also radiates upward towards the elbow.  She rates the pain as a 6/10.  She has tried nighttime splinting and NSAIDs without relief.    ROS:    Constitution: Denies chills, fever, and sweats.  HENT: Denies headaches or blurry vision.  Cardiovascular: Denies chest pain or irregular heart beat.  Respiratory: Denies cough or shortness of breath.  Gastrointestinal: Denies abdominal pain, nausea, or vomiting.  Genitourinary:  Denies urinary incontinence, bladder and kidney issues  Musculoskeletal:  Denies muscle cramps.  Positive for bilateral hand pain  Neurological: Denies dizziness or focal weakness.  Psychiatric/Behavioral: Normal mental status.  Hematologic/Lymphatic: Denies bleeding problem or easy bruising/bleeding.  Skin: Denies rash or suspicious lesions.    Physical examination     Gen - No acute distress, well nourished, well groomed   Eyes - Extraoccular motions intact, pupils equally round and reactive to light and accommodation   ENT - normocephalic, atruamtic, oropharynx clear   Neck - Supple, no abnormal masses   Cardiovascular - regular rate and rhythm   Pulmonary - clear to auscultation bilaterally, no wheezes, ronchi, or rales   Abdomen - soft, non-tender, non-distended, positive bowel sounds   Psych - The patient is alert and oriented x3 with normal mood and affect    Right Upper Extremity Examination     Skin is intact throughout   Motor is intact distally radial, median, ulnar, AIN, PIN   +2 radial and ulnar pulses   Sensation to light touch is intact distally radial, median, and ulnar   Full ROM at the DIP, PIP, and MCP joints   Wrist shows full ROM   No tenderness to palpation  noted     Carpal Tunnel compression test - negative   Phalen's Test - negative  Tinel's Test - positive  Finkelstien's Test - negative    No Ecchymosis noted   No Swelling noted     Triggering of fingers or thumb - negative    Left Upper Extremity Examination     Skin is intact throughout   Motor is intact distally radial, median, ulnar, AIN, PIN   +2 radial and ulnar pulses   Sensation to light touch is intact distally radial, median, and ulnar   Full ROM at the DIP, PIP, and MCP joints   Wrist shows full ROM   No tenderness to palpation noted     Carpal Tunnel compression test - positive   Phalen's Test - negative  Tinel's Test - positive  Finkelstien's Test - negative    No Ecchymosis noted   No Swelling noted     Triggering of fingers or thumb - negative    X-ray images were examined and personally interpreted by me.  Three views of bilateral wrist dated 02/24/2020 are available for interpretation.  Joint spaces are all well maintained.  There are no acute fractures.  Normal bony anatomy.    Nerve conduction study dated 02/28/2020 was reviewed.  There is a moderate carpal tunnel syndrome on the right.    Dx:  Bilateral carpal tunnel syndrome right worse than left.    Plan:  We discussed treatment options.  The patient has failed nighttime splinting and NSAIDs.  We did discuss injection versus surgery.  The patient states she would like to proceed with an injection.  We decided to treat the more symptomatic right side 1st.  Risks and benefits were explained the patient did wish to proceed.  Under ultrasound guidance we injected the right carpal tunnel with 1 cc of Depo-Medrol.  The patient tolerated well.  Follow up in 2 weeks for re-evaluation.  If she gets good results with the right injection we may inject the left side on her next visit.  Answers for HPI/ROS submitted by the patient on 5/5/2020   Hand injury  unexpected weight change: No  appetite change : No  sleep disturbance: Yes  IMMUNOCOMPROMISED:  No  nervous/ anxious: Yes  dysphoric mood: No  rash: No  visual disturbance: No  eye redness: No  eye pain: Yes  ear pain: No  tinnitus: No  hearing loss: No  sinus pressure : No  nosebleeds: No  enviro allergies: No  food allergies: No  cough: No  shortness of breath: No  sweating: No  dysuria: No  frequency: No  difficulty urinating: No  hematuria: No  painful intercourse: No  chest pain: No  palpitations: No  nausea: No  vomiting: No  diarrhea: No  blood in stool: No  constipation: No  headaches: No  dizziness: No  numbness: No  seizures: No  joint swelling: Yes  myalgia: Yes  weakness: Yes  back pain: Yes  Pain Chronicity: chronic  History of trauma: No  Onset: more than 1 month ago  Frequency: constantly  Progression since onset: gradually worsening  injury location: at work  pain- numeric: 6/10  pain location: left elbow, right elbow, left wrist, right wrist, left hand, right hand  pain quality: aching, dull, sharp, tightness, shooting, throbbing  Radiating Pain: Yes  If your pain is radiating, to what part of the body?: left arm, right arm, left forearm, right forearm, left hand, right hand  Aggravating factors: activity, walking  fever: No  inability to bear weight: Yes  itching: No  joint locking: Yes  limited range of motion: Yes  stiffness: Yes  tingling: Yes  Treatments tried: brace/corset, cold, heat, exercise, oral narcotics, OTC ointments, OTC pain meds, rest  physical therapy: ineffective  Improvement on treatment: no relief

## 2020-05-17 ENCOUNTER — PATIENT OUTREACH (OUTPATIENT)
Dept: ADMINISTRATIVE | Facility: OTHER | Age: 30
End: 2020-05-17

## 2020-05-21 ENCOUNTER — TELEPHONE (OUTPATIENT)
Dept: RHEUMATOLOGY | Facility: CLINIC | Age: 30
End: 2020-05-21

## 2020-05-21 NOTE — TELEPHONE ENCOUNTER
I tried to contact the patient this morning to confirm her appointment today with Dr Berry that is scheduled for   02:30 pm. I was unable to reach the patient via phone, but I did leave a voicemail.

## 2020-07-17 ENCOUNTER — TELEPHONE (OUTPATIENT)
Dept: FAMILY MEDICINE | Facility: CLINIC | Age: 30
End: 2020-07-17

## 2020-07-17 NOTE — TELEPHONE ENCOUNTER
----- Message from Jaja Castañeda sent at 7/17/2020  8:20 AM CDT -----  Contact: [phone] /Self  Patient is requesting to speak with nurse regarding a new prescription for her shoulder.  Please advise.

## 2020-07-17 NOTE — TELEPHONE ENCOUNTER
"Pt stated she needs referral or prescription to see massage therapist who takes her insurance for "large lump in neck and shoulder." I advised patient to contact her insurance to check coverage and check with LMT to see exactly what she needs for us to do and give us a call back. Understanding verbalized.   "

## 2020-08-04 ENCOUNTER — PATIENT OUTREACH (OUTPATIENT)
Dept: ADMINISTRATIVE | Facility: OTHER | Age: 30
End: 2020-08-04

## 2020-08-04 NOTE — PROGRESS NOTES
Chart was reviewed for overdue [facility]active [facility] Encounters ([facility])  topics  Updates were requested from care everywhere   updated

## 2020-08-05 ENCOUNTER — OFFICE VISIT (OUTPATIENT)
Dept: OPTOMETRY | Facility: CLINIC | Age: 30
End: 2020-08-05
Payer: COMMERCIAL

## 2020-08-05 DIAGNOSIS — H53.149 ACCOMMODATIVE ASTHENOPIA: ICD-10-CM

## 2020-08-05 DIAGNOSIS — H40.013 OPEN ANGLE WITH BORDERLINE FINDINGS OF BOTH EYES: ICD-10-CM

## 2020-08-05 DIAGNOSIS — H11.89 OTHER SPECIFIED DISORDERS OF CONJUNCTIVA: Primary | ICD-10-CM

## 2020-08-05 PROCEDURE — 92014 PR EYE EXAM, EST PATIENT,COMPREHESV: ICD-10-PCS | Mod: S$GLB,,, | Performed by: OPTOMETRIST

## 2020-08-05 PROCEDURE — 99999 PR PBB SHADOW E&M-EST. PATIENT-LVL II: ICD-10-PCS | Mod: PBBFAC,,, | Performed by: OPTOMETRIST

## 2020-08-05 PROCEDURE — 92014 COMPRE OPH EXAM EST PT 1/>: CPT | Mod: S$GLB,,, | Performed by: OPTOMETRIST

## 2020-08-05 PROCEDURE — 99999 PR PBB SHADOW E&M-EST. PATIENT-LVL II: CPT | Mod: PBBFAC,,, | Performed by: OPTOMETRIST

## 2020-08-05 NOTE — PROGRESS NOTES
HPI     DLS: 5/24/19- Andre     Pt states that OD was uncomfortable on Sunday. No gtts. States she noticed   on yesterday that she has a growth under OD upper lid that is bothersome.   States can feel when she touches lid. States noticing numbness and   tingling in hands and wrists, states has been dx with carpel tunnel does   not know if any of it is related. States no rhumatoid. Headache and temple   pain. States today no pain. Just headache and hand/wrist pain. OD is   Uncomfortable. Denies FOL. Has floaters OD. (not new)     (-) tinnitus   HAs for a few years, recently gotten worse, spending more time on   computer. No headaches on weekends when not on computer. No N/V. No vision   issues.     Last edited by Kaden John, OD on 8/5/2020  2:14 PM. (History)            Assessment /Plan     For exam results, see Encounter Report.    Other specified disorders of conjunctiva    Open angle with borderline findings of both eyes    Accommodative asthenopia      Orbital fat prolapse superior OD, OS. Discussed findings. Recommend otc artificial tears 2-4 times daily. Monitor yearly, sooner if any changes.    Glaucoma suspect secondary to larger than average CD ratio OU and + fam hx. Discussed with patient. Patient scheduled to RTC for HVF 24-2 RAQUEL FAST  OCT RNFL, will call with results.     Accommodative asthenopia, only having symptoms when working on computer all day. Recommend otc +0.75 readers while doing computer/near work. Return if worsening or no alleviation.      RTC  for glc eval, will call with results.

## 2020-08-07 ENCOUNTER — CLINICAL SUPPORT (OUTPATIENT)
Dept: OPHTHALMOLOGY | Facility: CLINIC | Age: 30
End: 2020-08-07
Payer: COMMERCIAL

## 2020-08-07 DIAGNOSIS — H40.013 OPEN ANGLE WITH BORDERLINE FINDINGS OF BOTH EYES: ICD-10-CM

## 2020-08-07 PROCEDURE — 92083 EXTENDED VISUAL FIELD XM: CPT | Mod: S$GLB,,, | Performed by: OPTOMETRIST

## 2020-08-07 PROCEDURE — 92083 HUMPHREY VISUAL FIELD - OU - BOTH EYES: ICD-10-PCS | Mod: S$GLB,,, | Performed by: OPTOMETRIST

## 2020-08-07 PROCEDURE — 92133 CPTRZD OPH DX IMG PST SGM ON: CPT | Mod: S$GLB,,, | Performed by: OPTOMETRIST

## 2020-08-07 PROCEDURE — 92133 POSTERIOR SEGMENT OCT OPTIC NERVE(OCULAR COHERENCE TOMOGRAPHY) - OU - BOTH EYES: ICD-10-PCS | Mod: S$GLB,,, | Performed by: OPTOMETRIST

## 2020-08-10 ENCOUNTER — TELEPHONE (OUTPATIENT)
Dept: OPTOMETRY | Facility: CLINIC | Age: 30
End: 2020-08-10

## 2020-08-12 ENCOUNTER — OFFICE VISIT (OUTPATIENT)
Dept: OPTOMETRY | Facility: CLINIC | Age: 30
End: 2020-08-12
Payer: COMMERCIAL

## 2020-08-12 DIAGNOSIS — H16.142 SUPERFICIAL PUNCTATE KERATITIS OF LEFT EYE: Primary | ICD-10-CM

## 2020-08-12 PROCEDURE — 99999 PR PBB SHADOW E&M-EST. PATIENT-LVL II: ICD-10-PCS | Mod: PBBFAC,,, | Performed by: OPTOMETRIST

## 2020-08-12 PROCEDURE — 99999 PR PBB SHADOW E&M-EST. PATIENT-LVL II: CPT | Mod: PBBFAC,,, | Performed by: OPTOMETRIST

## 2020-08-12 PROCEDURE — 92012 PR EYE EXAM, EST PATIENT,INTERMED: ICD-10-PCS | Mod: S$GLB,,, | Performed by: OPTOMETRIST

## 2020-08-12 PROCEDURE — 92012 INTRM OPH EXAM EST PATIENT: CPT | Mod: S$GLB,,, | Performed by: OPTOMETRIST

## 2020-08-12 RX ORDER — PREDNISOLONE ACETATE 10 MG/ML
1 SUSPENSION/ DROPS OPHTHALMIC 4 TIMES DAILY
Qty: 5 ML | Refills: 0 | Status: SHIPPED | OUTPATIENT
Start: 2020-08-12 | End: 2020-08-17

## 2020-08-12 NOTE — PROGRESS NOTES
"HPI     Eye Pain      Additional comments: pt woke up this morning w/ eye pain OS only ---   +FBS under PAM, red, irritated, tearing  // tried flushing with water,   +Thera Tears               Blurred Vision      Additional comments: OS vision "very blurry"               Comments     8/10 pain  + light sensitive          Last edited by Kaden John, OD on 8/12/2020  3:21 PM. (History)            Assessment /Plan     For exam results, see Encounter Report.    Superficial punctate keratitis of left eye  -     prednisoLONE acetate (PRED FORTE) 1 % DrpS; Place 1 drop into the left eye 4 (four) times daily. for 5 days  Dispense: 5 mL; Refill: 0      SPK inferior OS. - fb on upper and lower lid eversion. Start PF 1%: 1 gt qid OS x 5 days then d/c, shake bottle well before use. Pt to report back Friday if any worsening or no improvement.                  "

## 2020-10-27 ENCOUNTER — PATIENT MESSAGE (OUTPATIENT)
Dept: OPTOMETRY | Facility: CLINIC | Age: 30
End: 2020-10-27

## 2020-12-31 ENCOUNTER — TELEPHONE (OUTPATIENT)
Dept: FAMILY MEDICINE | Facility: CLINIC | Age: 30
End: 2020-12-31

## 2020-12-31 NOTE — TELEPHONE ENCOUNTER
----- Message from Yamilet Clark sent at 12/31/2020  2:47 PM CST -----  Contact: self  Patient is coughing, nasal congestion and lots of mucus, runny nose, explained to the patient that I couldn't schedule and that she will more than likely be sent for covid test.  Call back at [phone] (home).  Thanks

## 2020-12-31 NOTE — TELEPHONE ENCOUNTER
Patient confirmed she is experiencing nasal congestion, runny nose, and cough. Denied fever, loss of smell and taste. Patient stated symptoms began 3 days ago and she has been taking robitussin DM but it doesn't seem to be helping. Patient stated she is going to try and start taking mucinex to see if that helps. Advised patient we have no available appointments in clinic this week but can try to schedule her for next week, patient stated she will try to go to urgent care this weekend for treatment.

## 2021-04-05 ENCOUNTER — PATIENT MESSAGE (OUTPATIENT)
Dept: ADMINISTRATIVE | Facility: HOSPITAL | Age: 31
End: 2021-04-05

## 2021-05-12 ENCOUNTER — TELEPHONE (OUTPATIENT)
Dept: FAMILY MEDICINE | Facility: CLINIC | Age: 31
End: 2021-05-12

## 2021-05-12 ENCOUNTER — PATIENT MESSAGE (OUTPATIENT)
Dept: RESEARCH | Facility: HOSPITAL | Age: 31
End: 2021-05-12

## 2021-05-13 ENCOUNTER — TELEPHONE (OUTPATIENT)
Dept: FAMILY MEDICINE | Facility: CLINIC | Age: 31
End: 2021-05-13

## 2021-05-13 ENCOUNTER — OFFICE VISIT (OUTPATIENT)
Dept: FAMILY MEDICINE | Facility: CLINIC | Age: 31
End: 2021-05-13
Payer: COMMERCIAL

## 2021-05-13 ENCOUNTER — PATIENT MESSAGE (OUTPATIENT)
Dept: FAMILY MEDICINE | Facility: CLINIC | Age: 31
End: 2021-05-13

## 2021-05-13 VITALS
WEIGHT: 158.5 LBS | OXYGEN SATURATION: 99 % | HEIGHT: 68 IN | RESPIRATION RATE: 16 BRPM | HEART RATE: 73 BPM | SYSTOLIC BLOOD PRESSURE: 116 MMHG | TEMPERATURE: 97 F | DIASTOLIC BLOOD PRESSURE: 78 MMHG | BODY MASS INDEX: 24.02 KG/M2

## 2021-05-13 DIAGNOSIS — R53.83 FATIGUE, UNSPECIFIED TYPE: ICD-10-CM

## 2021-05-13 DIAGNOSIS — Z00.00 WELLNESS EXAMINATION: Primary | ICD-10-CM

## 2021-05-13 DIAGNOSIS — Z56.6 STRESS AT WORK: ICD-10-CM

## 2021-05-13 DIAGNOSIS — F41.9 ANXIETY: ICD-10-CM

## 2021-05-13 DIAGNOSIS — K21.9 GASTROESOPHAGEAL REFLUX DISEASE, UNSPECIFIED WHETHER ESOPHAGITIS PRESENT: ICD-10-CM

## 2021-05-13 DIAGNOSIS — Z13.220 SCREENING FOR LIPID DISORDERS: ICD-10-CM

## 2021-05-13 PROCEDURE — 99999 PR PBB SHADOW E&M-EST. PATIENT-LVL IV: CPT | Mod: PBBFAC,,, | Performed by: FAMILY MEDICINE

## 2021-05-13 PROCEDURE — 3008F BODY MASS INDEX DOCD: CPT | Mod: CPTII,S$GLB,, | Performed by: FAMILY MEDICINE

## 2021-05-13 PROCEDURE — 3008F PR BODY MASS INDEX (BMI) DOCUMENTED: ICD-10-PCS | Mod: CPTII,S$GLB,, | Performed by: FAMILY MEDICINE

## 2021-05-13 PROCEDURE — 1126F AMNT PAIN NOTED NONE PRSNT: CPT | Mod: S$GLB,,, | Performed by: FAMILY MEDICINE

## 2021-05-13 PROCEDURE — 99999 PR PBB SHADOW E&M-EST. PATIENT-LVL IV: ICD-10-PCS | Mod: PBBFAC,,, | Performed by: FAMILY MEDICINE

## 2021-05-13 PROCEDURE — 1126F PR PAIN SEVERITY QUANTIFIED, NO PAIN PRESENT: ICD-10-PCS | Mod: S$GLB,,, | Performed by: FAMILY MEDICINE

## 2021-05-13 PROCEDURE — 99214 OFFICE O/P EST MOD 30 MIN: CPT | Mod: S$GLB,,, | Performed by: FAMILY MEDICINE

## 2021-05-13 PROCEDURE — 99214 PR OFFICE/OUTPT VISIT, EST, LEVL IV, 30-39 MIN: ICD-10-PCS | Mod: S$GLB,,, | Performed by: FAMILY MEDICINE

## 2021-05-13 RX ORDER — SERTRALINE HYDROCHLORIDE 25 MG/1
25 TABLET, FILM COATED ORAL DAILY
Qty: 30 TABLET | Refills: 2 | Status: SHIPPED | OUTPATIENT
Start: 2021-05-13 | End: 2021-10-22

## 2021-05-13 RX ORDER — OMEPRAZOLE 20 MG/1
20 CAPSULE, DELAYED RELEASE ORAL DAILY
Qty: 90 CAPSULE | Refills: 1 | Status: SHIPPED | OUTPATIENT
Start: 2021-05-13 | End: 2021-10-22

## 2021-05-13 SDOH — SOCIAL DETERMINANTS OF HEALTH (SDOH): OTHER PHYSICAL AND MENTAL STRAIN RELATED TO WORK: Z56.6

## 2021-05-15 ENCOUNTER — LAB VISIT (OUTPATIENT)
Dept: LAB | Facility: HOSPITAL | Age: 31
End: 2021-05-15
Attending: FAMILY MEDICINE
Payer: COMMERCIAL

## 2021-05-15 DIAGNOSIS — Z13.220 SCREENING FOR LIPID DISORDERS: ICD-10-CM

## 2021-05-15 DIAGNOSIS — Z00.00 WELLNESS EXAMINATION: ICD-10-CM

## 2021-05-15 DIAGNOSIS — R53.83 FATIGUE, UNSPECIFIED TYPE: ICD-10-CM

## 2021-05-15 LAB
ALBUMIN SERPL BCP-MCNC: 4.1 G/DL (ref 3.5–5.2)
ALP SERPL-CCNC: 44 U/L (ref 55–135)
ALT SERPL W/O P-5'-P-CCNC: 14 U/L (ref 10–44)
ANION GAP SERPL CALC-SCNC: 9 MMOL/L (ref 8–16)
AST SERPL-CCNC: 15 U/L (ref 10–40)
BASOPHILS # BLD AUTO: 0.04 K/UL (ref 0–0.2)
BASOPHILS NFR BLD: 0.7 % (ref 0–1.9)
BILIRUB SERPL-MCNC: 0.8 MG/DL (ref 0.1–1)
BUN SERPL-MCNC: 10 MG/DL (ref 6–20)
CALCIUM SERPL-MCNC: 9.5 MG/DL (ref 8.7–10.5)
CHLORIDE SERPL-SCNC: 104 MMOL/L (ref 95–110)
CHOLEST SERPL-MCNC: 161 MG/DL (ref 120–199)
CHOLEST/HDLC SERPL: 2.6 {RATIO} (ref 2–5)
CO2 SERPL-SCNC: 25 MMOL/L (ref 23–29)
CREAT SERPL-MCNC: 0.8 MG/DL (ref 0.5–1.4)
DIFFERENTIAL METHOD: NORMAL
EOSINOPHIL # BLD AUTO: 0.1 K/UL (ref 0–0.5)
EOSINOPHIL NFR BLD: 1.7 % (ref 0–8)
ERYTHROCYTE [DISTWIDTH] IN BLOOD BY AUTOMATED COUNT: 12 % (ref 11.5–14.5)
EST. GFR  (AFRICAN AMERICAN): >60 ML/MIN/1.73 M^2
EST. GFR  (NON AFRICAN AMERICAN): >60 ML/MIN/1.73 M^2
GLUCOSE SERPL-MCNC: 82 MG/DL (ref 70–110)
HCT VFR BLD AUTO: 39.8 % (ref 37–48.5)
HDLC SERPL-MCNC: 63 MG/DL (ref 40–75)
HDLC SERPL: 39.1 % (ref 20–50)
HGB BLD-MCNC: 13.3 G/DL (ref 12–16)
IMM GRANULOCYTES # BLD AUTO: 0.01 K/UL (ref 0–0.04)
IMM GRANULOCYTES NFR BLD AUTO: 0.2 % (ref 0–0.5)
LDLC SERPL CALC-MCNC: 89.2 MG/DL (ref 63–159)
LYMPHOCYTES # BLD AUTO: 2 K/UL (ref 1–4.8)
LYMPHOCYTES NFR BLD: 33.8 % (ref 18–48)
MCH RBC QN AUTO: 30 PG (ref 27–31)
MCHC RBC AUTO-ENTMCNC: 33.4 G/DL (ref 32–36)
MCV RBC AUTO: 90 FL (ref 82–98)
MONOCYTES # BLD AUTO: 0.6 K/UL (ref 0.3–1)
MONOCYTES NFR BLD: 9.5 % (ref 4–15)
NEUTROPHILS # BLD AUTO: 3.2 K/UL (ref 1.8–7.7)
NEUTROPHILS NFR BLD: 54.1 % (ref 38–73)
NONHDLC SERPL-MCNC: 98 MG/DL
NRBC BLD-RTO: 0 /100 WBC
PLATELET # BLD AUTO: 298 K/UL (ref 150–450)
PMV BLD AUTO: 10.3 FL (ref 9.2–12.9)
POTASSIUM SERPL-SCNC: 4 MMOL/L (ref 3.5–5.1)
PROT SERPL-MCNC: 7.7 G/DL (ref 6–8.4)
RBC # BLD AUTO: 4.43 M/UL (ref 4–5.4)
SODIUM SERPL-SCNC: 138 MMOL/L (ref 136–145)
TRIGL SERPL-MCNC: 44 MG/DL (ref 30–150)
TSH SERPL DL<=0.005 MIU/L-ACNC: 0.83 UIU/ML (ref 0.4–4)
WBC # BLD AUTO: 5.88 K/UL (ref 3.9–12.7)

## 2021-05-15 PROCEDURE — 80053 COMPREHEN METABOLIC PANEL: CPT | Performed by: FAMILY MEDICINE

## 2021-05-15 PROCEDURE — 85025 COMPLETE CBC W/AUTO DIFF WBC: CPT | Performed by: FAMILY MEDICINE

## 2021-05-15 PROCEDURE — 36415 COLL VENOUS BLD VENIPUNCTURE: CPT | Performed by: FAMILY MEDICINE

## 2021-05-15 PROCEDURE — 80061 LIPID PANEL: CPT | Performed by: FAMILY MEDICINE

## 2021-05-15 PROCEDURE — 84443 ASSAY THYROID STIM HORMONE: CPT | Performed by: FAMILY MEDICINE

## 2021-10-01 ENCOUNTER — PATIENT OUTREACH (OUTPATIENT)
Dept: ADMINISTRATIVE | Facility: HOSPITAL | Age: 31
End: 2021-10-01

## 2021-10-01 ENCOUNTER — PATIENT MESSAGE (OUTPATIENT)
Dept: ADMINISTRATIVE | Facility: HOSPITAL | Age: 31
End: 2021-10-01

## 2021-10-11 ENCOUNTER — PATIENT OUTREACH (OUTPATIENT)
Dept: ADMINISTRATIVE | Facility: HOSPITAL | Age: 31
End: 2021-10-11

## 2021-10-22 ENCOUNTER — OFFICE VISIT (OUTPATIENT)
Dept: OPHTHALMOLOGY | Facility: CLINIC | Age: 31
End: 2021-10-22
Payer: COMMERCIAL

## 2021-10-22 DIAGNOSIS — H52.533 ACCOMMODATION SPASM, BILATERAL: Primary | ICD-10-CM

## 2021-10-22 DIAGNOSIS — H04.123 DRY EYE SYNDROME, BILATERAL: ICD-10-CM

## 2021-10-22 PROCEDURE — 99999 PR PBB SHADOW E&M-EST. PATIENT-LVL II: ICD-10-PCS | Mod: PBBFAC,,, | Performed by: OPHTHALMOLOGY

## 2021-10-22 PROCEDURE — 92004 PR EYE EXAM, NEW PATIENT,COMPREHESV: ICD-10-PCS | Mod: S$GLB,,, | Performed by: OPHTHALMOLOGY

## 2021-10-22 PROCEDURE — 92004 COMPRE OPH EXAM NEW PT 1/>: CPT | Mod: S$GLB,,, | Performed by: OPHTHALMOLOGY

## 2021-10-22 PROCEDURE — 99999 PR PBB SHADOW E&M-EST. PATIENT-LVL II: CPT | Mod: PBBFAC,,, | Performed by: OPHTHALMOLOGY

## 2021-10-22 PROCEDURE — 1159F PR MEDICATION LIST DOCUMENTED IN MEDICAL RECORD: ICD-10-PCS | Mod: CPTII,S$GLB,, | Performed by: OPHTHALMOLOGY

## 2021-10-22 PROCEDURE — 1160F RVW MEDS BY RX/DR IN RCRD: CPT | Mod: CPTII,S$GLB,, | Performed by: OPHTHALMOLOGY

## 2021-10-22 PROCEDURE — 1159F MED LIST DOCD IN RCRD: CPT | Mod: CPTII,S$GLB,, | Performed by: OPHTHALMOLOGY

## 2021-10-22 PROCEDURE — 1160F PR REVIEW ALL MEDS BY PRESCRIBER/CLIN PHARMACIST DOCUMENTED: ICD-10-PCS | Mod: CPTII,S$GLB,, | Performed by: OPHTHALMOLOGY

## 2022-01-09 ENCOUNTER — PATIENT OUTREACH (OUTPATIENT)
Dept: ADMINISTRATIVE | Facility: OTHER | Age: 32
End: 2022-01-09
Payer: COMMERCIAL

## 2022-01-09 NOTE — PROGRESS NOTES
Chart was reviewed for overdue [facility]counters ([facility])  topics  Updates were requested from care everywhere  Health Maintenance has been updated  LINKS immunization registry triggered

## 2022-01-10 ENCOUNTER — TELEPHONE (OUTPATIENT)
Dept: ORTHOPEDICS | Facility: CLINIC | Age: 32
End: 2022-01-10

## 2022-01-10 ENCOUNTER — OFFICE VISIT (OUTPATIENT)
Dept: ORTHOPEDICS | Facility: CLINIC | Age: 32
End: 2022-01-10
Payer: COMMERCIAL

## 2022-01-10 VITALS — WEIGHT: 158 LBS | BODY MASS INDEX: 23.95 KG/M2 | RESPIRATION RATE: 16 BRPM | HEIGHT: 68 IN

## 2022-01-10 DIAGNOSIS — G56.00 CARPAL TUNNEL SYNDROME: ICD-10-CM

## 2022-01-10 DIAGNOSIS — G56.03 CARPAL TUNNEL SYNDROME, BILATERAL: ICD-10-CM

## 2022-01-10 DIAGNOSIS — G56.03 CARPAL TUNNEL SYNDROME, BILATERAL: Primary | ICD-10-CM

## 2022-01-10 DIAGNOSIS — Z01.818 PRE-OP TESTING: ICD-10-CM

## 2022-01-10 DIAGNOSIS — Z01.818 PRE-OP TESTING: Primary | ICD-10-CM

## 2022-01-10 PROCEDURE — 99999 PR PBB SHADOW E&M-EST. PATIENT-LVL II: CPT | Mod: PBBFAC,,, | Performed by: ORTHOPAEDIC SURGERY

## 2022-01-10 PROCEDURE — 1160F PR REVIEW ALL MEDS BY PRESCRIBER/CLIN PHARMACIST DOCUMENTED: ICD-10-PCS | Mod: CPTII,S$GLB,, | Performed by: ORTHOPAEDIC SURGERY

## 2022-01-10 PROCEDURE — 3008F PR BODY MASS INDEX (BMI) DOCUMENTED: ICD-10-PCS | Mod: CPTII,S$GLB,, | Performed by: ORTHOPAEDIC SURGERY

## 2022-01-10 PROCEDURE — 1159F MED LIST DOCD IN RCRD: CPT | Mod: CPTII,S$GLB,, | Performed by: ORTHOPAEDIC SURGERY

## 2022-01-10 PROCEDURE — 99215 OFFICE O/P EST HI 40 MIN: CPT | Mod: S$GLB,,, | Performed by: ORTHOPAEDIC SURGERY

## 2022-01-10 PROCEDURE — 99999 PR PBB SHADOW E&M-EST. PATIENT-LVL II: ICD-10-PCS | Mod: PBBFAC,,, | Performed by: ORTHOPAEDIC SURGERY

## 2022-01-10 PROCEDURE — 1159F PR MEDICATION LIST DOCUMENTED IN MEDICAL RECORD: ICD-10-PCS | Mod: CPTII,S$GLB,, | Performed by: ORTHOPAEDIC SURGERY

## 2022-01-10 PROCEDURE — 3008F BODY MASS INDEX DOCD: CPT | Mod: CPTII,S$GLB,, | Performed by: ORTHOPAEDIC SURGERY

## 2022-01-10 PROCEDURE — 1160F RVW MEDS BY RX/DR IN RCRD: CPT | Mod: CPTII,S$GLB,, | Performed by: ORTHOPAEDIC SURGERY

## 2022-01-10 PROCEDURE — 99215 PR OFFICE/OUTPT VISIT, EST, LEVL V, 40-54 MIN: ICD-10-PCS | Mod: S$GLB,,, | Performed by: ORTHOPAEDIC SURGERY

## 2022-01-10 RX ORDER — MUPIROCIN 20 MG/G
OINTMENT TOPICAL
Status: CANCELLED | OUTPATIENT
Start: 2022-01-10

## 2022-01-10 RX ORDER — CLINDAMYCIN PHOSPHATE 900 MG/50ML
900 INJECTION, SOLUTION INTRAVENOUS
Status: CANCELLED | OUTPATIENT
Start: 2022-01-10

## 2022-01-10 NOTE — H&P (VIEW-ONLY)
CC:  31-year-old female follows up with bilateral carpal tunnel syndrome.  I saw the patient about 2 years ago and injected her carpal tunnel on the right side.  She states that that actually made her pain worse.  She did get any relief with the injection.  Her symptoms have progressively gotten worse and now she is having numbness pretty much constantly during the day and at night.  She rates her pain as a 5/10.    Past Medical History:   Diagnosis Date    Allergic rhinitis with postnasal drip 5/27/2019    Anxiety     Chronic cough 4/12/2019    Eczema 4/12/2019       Past Surgical History:   Procedure Laterality Date    CHOLECYSTECTOMY      gallblader removal      around age 19    LIPOSUCTION OF ABDOMEN         No current outpatient medications on file prior to visit.     No current facility-administered medications on file prior to visit.       ROS:    Constitution: Denies chills, fever, and sweats.  HENT: Denies headaches or blurry vision.  Cardiovascular: Denies chest pain or irregular heart beat.  Respiratory: Denies cough or shortness of breath.  Gastrointestinal: Denies abdominal pain, nausea, or vomiting.  Genitourinary:  Denies urinary incontinence, bladder and kidney issues  Musculoskeletal:  Denies muscle cramps.  Neurological: Denies dizziness or focal weakness.  Psychiatric/Behavioral: Normal mental status.  Hematologic/Lymphatic: Denies bleeding problem or easy bruising/bleeding.  Skin: Denies rash or suspicious lesions.    Physical examination     Gen - No acute distress, well nourished, well groomed   Eyes - Extraoccular motions intact, pupils equally round and reactive to light and accommodation   ENT - normocephalic, atruamtic, oropharynx clear   Neck - Supple, no abnormal masses   Cardiovascular - regular rate and rhythm   Pulmonary - clear to auscultation bilaterally, no wheezes, ronchi, or rales   Abdomen - soft, non-tender, non-distended, positive bowel sounds   Psych - The patient is  alert and oriented x3 with normal mood and affect    Right Upper Extremity Examination     Skin is intact throughout   Motor is intact distally radial, median, ulnar, AIN, PIN   +2 radial and ulnar pulses   Sensation to light touch is intact distally radial, median, and ulnar   Full ROM at the DIP, PIP, and MCP joints   Wrist shows full ROM   No tenderness to palpation noted     Carpal Tunnel compression test - positive   Phalen's Test - negative  Tinel's Test - negative  Finkelstien's Test - negative    No Ecchymosis noted   No Swelling noted     Triggering of fingers or thumb - negative     Left Upper Extremity Examination     Skin is intact throughout   Motor is intact distally radial, median, ulnar, AIN, PIN   +2 radial and ulnar pulses   Sensation to light touch is intact distally radial, median, and ulnar   Full ROM at the DIP, PIP, and MCP joints   Wrist shows full ROM   No tenderness to palpation noted     Carpal Tunnel compression test - positive   Phalen's Test - negative  Tinel's Test - negative  Finkelstien's Test - negative    No Ecchymosis noted   No Swelling noted     Triggering of fingers or thumb - negative    Dx:  Symptomatic bilateral carpal tunnel syndrome    Plan:  We discussed treatment options.  The patient really did not get any relief with the injection previously.  We discussed the potential morbidity to the upper extremity with both operative and continue non operative treatments.  The patient verbalized understanding.  She would like to proceed with left carpal tunnel release 1st.  She states that side is more symptomatic.  Risks and benefits of surgery were explained the patient.  She verbalized understanding and does wish to proceed.  We will schedule that for the next available date that is convenient for her.

## 2022-01-10 NOTE — TELEPHONE ENCOUNTER
----- Message from Krista Dooley MA sent at 1/10/2022 11:19 AM CST -----  Contact: pt  Pt calling to switch hands on upcoming surgery start with Left hand   Call back

## 2022-01-18 ENCOUNTER — TELEPHONE (OUTPATIENT)
Dept: ORTHOPEDICS | Facility: CLINIC | Age: 32
End: 2022-01-18
Payer: COMMERCIAL

## 2022-01-18 NOTE — TELEPHONE ENCOUNTER
----- Message from Krista Dooley MA sent at 1/18/2022  9:19 AM CST -----  Contact: pt  Wants to reschedule surgery   02/02/2022   Call back

## 2022-01-27 ENCOUNTER — HOSPITAL ENCOUNTER (OUTPATIENT)
Dept: PREADMISSION TESTING | Facility: HOSPITAL | Age: 32
Discharge: HOME | End: 2022-01-27
Attending: ORTHOPAEDIC SURGERY
Payer: COMMERCIAL

## 2022-01-27 ENCOUNTER — TELEPHONE (OUTPATIENT)
Dept: ORTHOPEDICS | Facility: CLINIC | Age: 32
End: 2022-01-27
Payer: COMMERCIAL

## 2022-01-27 NOTE — TELEPHONE ENCOUNTER
----- Message from Diana Garcia RN sent at 1/27/2022  8:33 AM CST -----  Patient requested that someone call her.  She has paperwork that needs to be filled out by her job and wants to know how much time will be in between getting her left CTR and right CTR.    Thanks  Diana Pre-op

## 2022-01-27 NOTE — LETTER
January 27, 2022    Keara Wood  [address] [place] [address]         [facility]  [place] [address]  Phone: [phone] January 27, 2022     Patient: Keara Wood   YOB: 1990   Date of Visit: 1/27/2022       To Whom It May Concern:    It is my medical opinion that Keara Wood is having a procedure on her left hand on 02/02/2022. Post operative she will be unable to perform any activities with her left upper extremity for approximately 12 weeks.    If you have any questions or concerns, please don't hesitate to call.    Sincerely,        Bipin Newman II, MD

## 2022-01-30 ENCOUNTER — LAB VISIT (OUTPATIENT)
Dept: PRIMARY CARE CLINIC | Facility: CLINIC | Age: 32
End: 2022-01-30
Payer: COMMERCIAL

## 2022-01-30 DIAGNOSIS — Z01.818 PRE-OP TESTING: ICD-10-CM

## 2022-01-30 PROCEDURE — U0003 INFECTIOUS AGENT DETECTION BY NUCLEIC ACID (DNA OR RNA); SEVERE ACUTE RESPIRATORY SYNDROME CORONAVIRUS 2 (SARS-COV-2) (CORONAVIRUS DISEASE [COVID-19]), AMPLIFIED PROBE TECHNIQUE, MAKING USE OF HIGH THROUGHPUT TECHNOLOGIES AS DESCRIBED BY CMS-2020-01-R: HCPCS | Performed by: ORTHOPAEDIC SURGERY

## 2022-01-30 PROCEDURE — U0005 INFEC AGEN DETEC AMPLI PROBE: HCPCS | Performed by: ORTHOPAEDIC SURGERY

## 2022-01-31 ENCOUNTER — PATIENT MESSAGE (OUTPATIENT)
Dept: SURGERY | Facility: HOSPITAL | Age: 32
End: 2022-01-31
Payer: COMMERCIAL

## 2022-01-31 LAB
SARS-COV-2 RNA RESP QL NAA+PROBE: NOT DETECTED
SARS-COV-2- CYCLE NUMBER: NORMAL

## 2022-02-01 ENCOUNTER — ANESTHESIA EVENT (OUTPATIENT)
Dept: SURGERY | Facility: HOSPITAL | Age: 32
End: 2022-02-01
Payer: COMMERCIAL

## 2022-02-01 ENCOUNTER — PATIENT MESSAGE (OUTPATIENT)
Dept: SURGERY | Facility: HOSPITAL | Age: 32
End: 2022-02-01
Payer: COMMERCIAL

## 2022-02-02 ENCOUNTER — HOSPITAL ENCOUNTER (OUTPATIENT)
Facility: HOSPITAL | Age: 32
Discharge: HOME | End: 2022-02-02
Attending: ORTHOPAEDIC SURGERY | Admitting: ORTHOPAEDIC SURGERY
Payer: COMMERCIAL

## 2022-02-02 ENCOUNTER — ANESTHESIA (OUTPATIENT)
Dept: SURGERY | Facility: HOSPITAL | Age: 32
End: 2022-02-02
Payer: COMMERCIAL

## 2022-02-02 VITALS
DIASTOLIC BLOOD PRESSURE: 81 MMHG | WEIGHT: 158.06 LBS | BODY MASS INDEX: 23.95 KG/M2 | RESPIRATION RATE: 19 BRPM | HEIGHT: 68 IN | SYSTOLIC BLOOD PRESSURE: 118 MMHG | OXYGEN SATURATION: 99 % | TEMPERATURE: 98 F | HEART RATE: 64 BPM

## 2022-02-02 DIAGNOSIS — Z01.818 PRE-OP TESTING: ICD-10-CM

## 2022-02-02 DIAGNOSIS — G56.00 CARPAL TUNNEL SYNDROME: Primary | ICD-10-CM

## 2022-02-02 DIAGNOSIS — G56.03 CARPAL TUNNEL SYNDROME, BILATERAL: ICD-10-CM

## 2022-02-02 LAB
ANION GAP SERPL CALC-SCNC: 11 MMOL/L (ref 8–16)
B-HCG UR QL: NEGATIVE
BASOPHILS # BLD AUTO: 0.02 K/UL (ref 0–0.2)
BASOPHILS NFR BLD: 0.3 % (ref 0–1.9)
BUN SERPL-MCNC: 15 MG/DL (ref 6–20)
CALCIUM SERPL-MCNC: 9.4 MG/DL (ref 8.7–10.5)
CHLORIDE SERPL-SCNC: 104 MMOL/L (ref 95–110)
CO2 SERPL-SCNC: 24 MMOL/L (ref 23–29)
CREAT SERPL-MCNC: 0.7 MG/DL (ref 0.5–1.4)
CTP QC/QA: YES
DIFFERENTIAL METHOD: ABNORMAL
EOSINOPHIL # BLD AUTO: 0.1 K/UL (ref 0–0.5)
EOSINOPHIL NFR BLD: 1.4 % (ref 0–8)
ERYTHROCYTE [DISTWIDTH] IN BLOOD BY AUTOMATED COUNT: 11.9 % (ref 11.5–14.5)
EST. GFR  (AFRICAN AMERICAN): >60 ML/MIN/1.73 M^2
EST. GFR  (NON AFRICAN AMERICAN): >60 ML/MIN/1.73 M^2
GLUCOSE SERPL-MCNC: 94 MG/DL (ref 70–110)
HCT VFR BLD AUTO: 35.3 % (ref 37–48.5)
HGB BLD-MCNC: 12 G/DL (ref 12–16)
IMM GRANULOCYTES # BLD AUTO: 0.01 K/UL (ref 0–0.04)
IMM GRANULOCYTES NFR BLD AUTO: 0.2 % (ref 0–0.5)
LYMPHOCYTES # BLD AUTO: 2 K/UL (ref 1–4.8)
LYMPHOCYTES NFR BLD: 33.6 % (ref 18–48)
MCH RBC QN AUTO: 30.2 PG (ref 27–31)
MCHC RBC AUTO-ENTMCNC: 34 G/DL (ref 32–36)
MCV RBC AUTO: 89 FL (ref 82–98)
MONOCYTES # BLD AUTO: 0.6 K/UL (ref 0.3–1)
MONOCYTES NFR BLD: 10.8 % (ref 4–15)
NEUTROPHILS # BLD AUTO: 3.1 K/UL (ref 1.8–7.7)
NEUTROPHILS NFR BLD: 53.7 % (ref 38–73)
NRBC BLD-RTO: 0 /100 WBC
PLATELET # BLD AUTO: 322 K/UL (ref 150–450)
PMV BLD AUTO: 10.1 FL (ref 9.2–12.9)
POTASSIUM SERPL-SCNC: 3.5 MMOL/L (ref 3.5–5.1)
RBC # BLD AUTO: 3.97 M/UL (ref 4–5.4)
SODIUM SERPL-SCNC: 139 MMOL/L (ref 136–145)
WBC # BLD AUTO: 5.81 K/UL (ref 3.9–12.7)

## 2022-02-02 PROCEDURE — 25000003 PHARM REV CODE 250: Performed by: ANESTHESIOLOGY

## 2022-02-02 PROCEDURE — 37000008 HC ANESTHESIA 1ST 15 MINUTES: Performed by: ORTHOPAEDIC SURGERY

## 2022-02-02 PROCEDURE — 71000039 HC RECOVERY, EACH ADD'L HOUR: Performed by: ORTHOPAEDIC SURGERY

## 2022-02-02 PROCEDURE — 25000003 PHARM REV CODE 250: Performed by: NURSE ANESTHETIST, CERTIFIED REGISTERED

## 2022-02-02 PROCEDURE — 99900103 DSU ONLY-NO CHARGE-INITIAL HR (STAT): Performed by: ORTHOPAEDIC SURGERY

## 2022-02-02 PROCEDURE — 25000003 PHARM REV CODE 250: Performed by: ORTHOPAEDIC SURGERY

## 2022-02-02 PROCEDURE — 37000009 HC ANESTHESIA EA ADD 15 MINS: Performed by: ORTHOPAEDIC SURGERY

## 2022-02-02 PROCEDURE — D9220A PRA ANESTHESIA: ICD-10-PCS | Mod: ANES,,, | Performed by: ANESTHESIOLOGY

## 2022-02-02 PROCEDURE — 64721 PR REVISE MEDIAN N/CARPAL TUNNEL SURG: ICD-10-PCS | Mod: LT,,, | Performed by: ORTHOPAEDIC SURGERY

## 2022-02-02 PROCEDURE — 71000015 HC POSTOP RECOV 1ST HR: Performed by: ORTHOPAEDIC SURGERY

## 2022-02-02 PROCEDURE — 80048 BASIC METABOLIC PNL TOTAL CA: CPT | Performed by: ORTHOPAEDIC SURGERY

## 2022-02-02 PROCEDURE — D9220A PRA ANESTHESIA: Mod: ANES,,, | Performed by: ANESTHESIOLOGY

## 2022-02-02 PROCEDURE — 81025 URINE PREGNANCY TEST: CPT | Performed by: ORTHOPAEDIC SURGERY

## 2022-02-02 PROCEDURE — 36000707: Performed by: ORTHOPAEDIC SURGERY

## 2022-02-02 PROCEDURE — 64721 CARPAL TUNNEL SURGERY: CPT | Mod: LT,,, | Performed by: ORTHOPAEDIC SURGERY

## 2022-02-02 PROCEDURE — D9220A PRA ANESTHESIA: Mod: CRNA,,, | Performed by: NURSE ANESTHETIST, CERTIFIED REGISTERED

## 2022-02-02 PROCEDURE — 63600175 PHARM REV CODE 636 W HCPCS: Performed by: NURSE ANESTHETIST, CERTIFIED REGISTERED

## 2022-02-02 PROCEDURE — D9220A PRA ANESTHESIA: ICD-10-PCS | Mod: CRNA,,, | Performed by: NURSE ANESTHETIST, CERTIFIED REGISTERED

## 2022-02-02 PROCEDURE — 99900104 DSU ONLY-NO CHARGE-EA ADD'L HR (STAT): Performed by: ORTHOPAEDIC SURGERY

## 2022-02-02 PROCEDURE — 36000706: Performed by: ORTHOPAEDIC SURGERY

## 2022-02-02 PROCEDURE — 71000033 HC RECOVERY, INTIAL HOUR: Performed by: ORTHOPAEDIC SURGERY

## 2022-02-02 PROCEDURE — 27200651 HC AIRWAY, LMA: Performed by: ANESTHESIOLOGY

## 2022-02-02 PROCEDURE — 63600175 PHARM REV CODE 636 W HCPCS: Performed by: ANESTHESIOLOGY

## 2022-02-02 PROCEDURE — 85025 COMPLETE CBC W/AUTO DIFF WBC: CPT | Performed by: ORTHOPAEDIC SURGERY

## 2022-02-02 PROCEDURE — 36415 COLL VENOUS BLD VENIPUNCTURE: CPT | Performed by: ORTHOPAEDIC SURGERY

## 2022-02-02 RX ORDER — BUPIVACAINE HYDROCHLORIDE 2.5 MG/ML
INJECTION, SOLUTION EPIDURAL; INFILTRATION; INTRACAUDAL
Status: DISCONTINUED | OUTPATIENT
Start: 2022-02-02 | End: 2022-02-02 | Stop reason: HOSPADM

## 2022-02-02 RX ORDER — LIDOCAINE HCL/PF 100 MG/5ML
SYRINGE (ML) INTRAVENOUS
Status: DISCONTINUED | OUTPATIENT
Start: 2022-02-02 | End: 2022-02-02

## 2022-02-02 RX ORDER — DEXAMETHASONE SODIUM PHOSPHATE 4 MG/ML
INJECTION, SOLUTION INTRA-ARTICULAR; INTRALESIONAL; INTRAMUSCULAR; INTRAVENOUS; SOFT TISSUE
Status: DISCONTINUED | OUTPATIENT
Start: 2022-02-02 | End: 2022-02-02

## 2022-02-02 RX ORDER — ACETAMINOPHEN 10 MG/ML
INJECTION, SOLUTION INTRAVENOUS
Status: DISCONTINUED | OUTPATIENT
Start: 2022-02-02 | End: 2022-02-02

## 2022-02-02 RX ORDER — FENTANYL CITRATE 50 UG/ML
25 INJECTION, SOLUTION INTRAMUSCULAR; INTRAVENOUS EVERY 5 MIN PRN
Status: DISCONTINUED | OUTPATIENT
Start: 2022-02-02 | End: 2022-02-02 | Stop reason: HOSPADM

## 2022-02-02 RX ORDER — ONDANSETRON HYDROCHLORIDE 2 MG/ML
INJECTION, SOLUTION INTRAMUSCULAR; INTRAVENOUS
Status: DISCONTINUED | OUTPATIENT
Start: 2022-02-02 | End: 2022-02-02

## 2022-02-02 RX ORDER — PROPOFOL 10 MG/ML
VIAL (ML) INTRAVENOUS
Status: DISCONTINUED | OUTPATIENT
Start: 2022-02-02 | End: 2022-02-02

## 2022-02-02 RX ORDER — HYDROMORPHONE HYDROCHLORIDE 2 MG/ML
0.2 INJECTION, SOLUTION INTRAMUSCULAR; INTRAVENOUS; SUBCUTANEOUS EVERY 5 MIN PRN
Status: DISCONTINUED | OUTPATIENT
Start: 2022-02-02 | End: 2022-02-02 | Stop reason: HOSPADM

## 2022-02-02 RX ORDER — MUPIROCIN 20 MG/G
OINTMENT TOPICAL
Status: DISCONTINUED | OUTPATIENT
Start: 2022-02-02 | End: 2022-02-02 | Stop reason: HOSPADM

## 2022-02-02 RX ORDER — OXYCODONE HYDROCHLORIDE 5 MG/1
5 TABLET ORAL
Status: DISCONTINUED | OUTPATIENT
Start: 2022-02-02 | End: 2022-02-02 | Stop reason: HOSPADM

## 2022-02-02 RX ORDER — CLINDAMYCIN PHOSPHATE 900 MG/50ML
900 INJECTION, SOLUTION INTRAVENOUS
Status: COMPLETED | OUTPATIENT
Start: 2022-02-02 | End: 2022-02-02

## 2022-02-02 RX ORDER — MIDAZOLAM HYDROCHLORIDE 1 MG/ML
INJECTION INTRAMUSCULAR; INTRAVENOUS
Status: DISCONTINUED | OUTPATIENT
Start: 2022-02-02 | End: 2022-02-02

## 2022-02-02 RX ORDER — HYDROCODONE BITARTRATE AND ACETAMINOPHEN 7.5; 325 MG/1; MG/1
1 TABLET ORAL EVERY 6 HOURS PRN
Qty: 28 TABLET | Refills: 0 | Status: SHIPPED | OUTPATIENT
Start: 2022-02-02 | End: 2022-02-09

## 2022-02-02 RX ORDER — KETOROLAC TROMETHAMINE 30 MG/ML
INJECTION, SOLUTION INTRAMUSCULAR; INTRAVENOUS
Status: DISCONTINUED | OUTPATIENT
Start: 2022-02-02 | End: 2022-02-02

## 2022-02-02 RX ORDER — FENTANYL CITRATE 50 UG/ML
INJECTION, SOLUTION INTRAMUSCULAR; INTRAVENOUS
Status: DISCONTINUED | OUTPATIENT
Start: 2022-02-02 | End: 2022-02-02

## 2022-02-02 RX ADMIN — FENTANYL CITRATE 50 MCG: 50 INJECTION, SOLUTION INTRAMUSCULAR; INTRAVENOUS at 07:02

## 2022-02-02 RX ADMIN — FENTANYL CITRATE 25 MCG: 50 INJECTION INTRAMUSCULAR; INTRAVENOUS at 08:02

## 2022-02-02 RX ADMIN — KETOROLAC TROMETHAMINE 30 MG: 30 INJECTION, SOLUTION INTRAMUSCULAR; INTRAVENOUS at 07:02

## 2022-02-02 RX ADMIN — CLINDAMYCIN PHOSPHATE 900 MG: 18 INJECTION, SOLUTION INTRAVENOUS at 07:02

## 2022-02-02 RX ADMIN — ONDANSETRON 4 MG: 2 INJECTION, SOLUTION INTRAMUSCULAR; INTRAVENOUS at 07:02

## 2022-02-02 RX ADMIN — DEXAMETHASONE SODIUM PHOSPHATE 4 MG: 4 INJECTION, SOLUTION INTRA-ARTICULAR; INTRALESIONAL; INTRAMUSCULAR; INTRAVENOUS; SOFT TISSUE at 07:02

## 2022-02-02 RX ADMIN — LIDOCAINE HYDROCHLORIDE 75 MG: 20 INJECTION INTRAVENOUS at 07:02

## 2022-02-02 RX ADMIN — ACETAMINOPHEN 1000 MG: 10 INJECTION, SOLUTION INTRAVENOUS at 07:02

## 2022-02-02 RX ADMIN — PROPOFOL 200 MG: 10 INJECTION, EMULSION INTRAVENOUS at 07:02

## 2022-02-02 RX ADMIN — OXYCODONE 5 MG: 5 TABLET ORAL at 08:02

## 2022-02-02 RX ADMIN — MIDAZOLAM HYDROCHLORIDE 2 MG: 1 INJECTION, SOLUTION INTRAMUSCULAR; INTRAVENOUS at 06:02

## 2022-02-02 RX ADMIN — MUPIROCIN: 20 OINTMENT TOPICAL at 06:02

## 2022-02-02 NOTE — ANESTHESIA POSTPROCEDURE EVALUATION
Anesthesia Post Evaluation    Patient: Keara Wood    Procedure(s) Performed: Procedure(s) (LRB):  RELEASE, CARPAL TUNNEL (Left)    Final Anesthesia Type: general      Patient location during evaluation: PACU  Patient participation: Yes- Able to Participate  Level of consciousness: sedated and awake  Post-procedure vital signs: reviewed and stable  Pain management: adequate  Airway patency: patent    PONV status at discharge: No PONV  Anesthetic complications: no      Cardiovascular status: blood pressure returned to baseline  Respiratory status: spontaneous ventilation  Hydration status: euvolemic  Follow-up not needed.          Vitals Value Taken Time   /67 02/02/22 0755   Temp 36.7 °C (98.1 °F) 02/02/22 0730   Pulse 68 02/02/22 0757   Resp 11 02/02/22 0757   SpO2 100 % 02/02/22 0757   Vitals shown include unvalidated device data.      No case tracking events are documented in the log.      Pain/Ibrahima Score: Ibrahima Score: 3 (2/2/2022  7:40 AM)

## 2022-02-02 NOTE — PLAN OF CARE
Discharged home with spouse post void, handouts provided, IV removed and both stated readiness for discharge home.

## 2022-02-02 NOTE — OP NOTE
[facility]  Orthopedic Surgery Department  Operative Note    SUMMARY     Date of Procedure: 2/2/2022     Procedure: Procedure(s) (LRB):  RELEASE, CARPAL TUNNEL (Left)     Surgeon(s) and Role:     * Bipin Newman II, MD - Primary    Assisting Surgeon: None    First Assist:  VERONIQUE Castellon    Pre-Operative Diagnosis: Pre-op testing [Z01.818]  Carpal tunnel syndrome, bilateral [G56.03]    Post-Operative Diagnosis: Post-Op Diagnosis Codes:     * Pre-op testing [Z01.818]     * Carpal tunnel syndrome, bilateral [G56.03]    Anesthesia: General    Technical Procedures Used:  Left carpal tunnel release    Description of the Findings of the Procedure:  Dictated    Significant Surgical Tasks Conducted by the Assistant(s), if Applicable:  Positioning and prepping the patient, retraction during carpal tunnel release, wound closure and bandage application.    Complications: No    Estimated Blood Loss (EBL): * No values recorded between 2/2/2022  7:15 AM and 2/2/2022  7:28 AM *           Implants: * No implants in log *    Specimens:   Specimen (24h ago, onward)            None                  Condition: Good    Disposition: PACU - hemodynamically stable.    Attestation: I was present for the entire procedure.    Procedure In Detail:  The patient is brought to the operating room and placed on the table in the supine position.  The patient underwent anesthesia with the anesthesia service.  A tourniquet was placed on the left upper extremity and was prepped and draped in the normal sterile fashion.  An Esmarch was used exsanguinate the limb and the tourniquet was taken up to 250 mmHg.  Incision was made over the course of the transverse carpal ligament in line with the 4th digit.  Dissection was taken through skin and subcutaneous tissue through the palmar fascia down to the transverse carpal ligament.  A stab incision was made in the transverse carpal ligament and a Happy was passed through this stab  incision.  Using the Rochester to protect the median nerve, a pair of curved tenotomy scissors used to release the transverse carpal ligament proximally and distally.  The Rochester was then used to probe to assure complete release.  Once we had assured release the wound was injected with plain Marcaine and closed with 3-0 nylon.  A sterile intraoperative dressing was applied and the patient was awakened from anesthesia and taken recovery where she was noted to be stable postoperatively.  Needle and lap counts were correct at the end of the case.

## 2022-02-02 NOTE — DISCHARGE INSTRUCTIONS
General Information:    1.  Do not drink alcoholic beverages including beer for 24 hours or as long as you are on pain medication..  2.  Do not drive a motor vehicle, operate machinery or power tools, or signs legal papers for 24 hours or as long as you are on pain medication.   3.  You may experience light-headedness, dizziness, and sleepiness following surgery. Please do not stay alone. A responsible adult should be with you for this 24 hour period.  4.  Go home and rest.    5. Progress slowly to a normal diet unless instructed.  Otherwise, begin with liquids such as soft drinks, then soup and crackers working up to solid foods. Drink plenty of nonalcoholic fluids.  6.  Certain anesthetics and pain medications produce nausea and vomiting in certain       individuals. If nausea becomes a problem at home, call you doctor.    7. A nurse will be calling you sometime after surgery. Do not be alarmed. This is our way of finding out how you are doing.    8. Several times every hour while you are awake, take 2-3 deep breaths and cough. If you had stomach surgery hold a pillow or rolled towel firmly against your stomach before you cough. This will help with any pain the cough might cause.  9. Several times every hour while you are awake, pump and flex your feet 5-6 times and do foot circles. This will help prevent blood clots.    10.Call your doctor for severe pain, bleeding, fever, or signs or symptoms of infection (pain, swelling, redness, foul odor, drainage).    Discharge Instructions: After Your Surgery/Procedure  Youve just had surgery. During surgery you were given medicine called anesthesia to keep you relaxed and free of pain. After surgery you may have some pain or nausea. This is common. Here are some tips for feeling better and getting well after surgery.     Stay on schedule with your medication.   Going home  Your doctor or nurse will show you how to take care of yourself when you go home. He or she will  "also answer your questions. Have an adult family member or friend drive you home.      For your safety we recommend these precaution for the first 24 hours after your procedure:  · Do not drive or use heavy equipment.  · Do not make important decisions or sign legal papers.  · Do not drink alcohol.  · Have someone stay with you, if needed. He or she can watch for problems and help keep you safe.  · Your concentration, balance, coordination, and judgement may be impaired for many hours after anesthesia.  Use caution when ambulating or standing up.     · You may feel weak and "washed out" after anesthesia and surgery.      Subtle residual effects of general anesthesia or sedation with regional / local anesthesia can last more than 24 hours.  Rest for the remainder of the day or longer if your Doctor/Surgeon has advised you to do so.  Although you may feel normal within the first 24 hours, your reflexes and mental ability may be impaired without you realizing it.  You may feel dizzy, lightheaded or sleepy for 24 hours or longer.      Be sure to go to all follow-up visits with your doctor. And rest after your surgery for as long as your doctor tells you to.  Coping with pain  If you have pain after surgery, pain medicine will help you feel better. Take it as told, before pain becomes severe. Also, ask your doctor or pharmacist about other ways to control pain. This might be with heat, ice, or relaxation. And follow any other instructions your surgeon or nurse gives you.  Tips for taking pain medicine  To get the best relief possible, remember these points:  · Pain medicines can upset your stomach. Taking them with a little food may help.  · Most pain relievers taken by mouth need at least 20 to 30 minutes to start to work.  · Taking medicine on a schedule can help you remember to take it. Try to time your medicine so that you can take it before starting an activity. This might be before you get dressed, go for a walk, " or sit down for dinner.  · Constipation is a common side effect of pain medicines. Call your doctor before taking any medicines such as laxatives or stool softeners to help ease constipation. Also ask if you should skip any foods. Drinking lots of fluids and eating foods such as fruits and vegetables that are high in fiber can also help. Remember, do not take laxatives unless your surgeon has prescribed them.  · Drinking alcohol and taking pain medicine can cause dizziness and slow your breathing. It can even be deadly. Do not drink alcohol while taking pain medicine.  · Pain medicine can make you react more slowly to things. Do not drive or run machinery while taking pain medicine.  Your health care provider may tell you to take acetaminophen to help ease your pain. Ask him or her how much you are supposed to take each day. Acetaminophen or other pain relievers may interact with your prescription medicines or other over-the-counter (OTC) drugs. Some prescription medicines have acetaminophen and other ingredients. Using both prescription and OTC acetaminophen for pain can cause you to overdose. Read the labels on your OTC medicines with care. This will help you to clearly know the list of ingredients, how much to take, and any warnings. It may also help you not take too much acetaminophen. If you have questions or do not understand the information, ask your pharmacist or health care provider to explain it to you before you take the OTC medicine.  Managing nausea  Some people have an upset stomach after surgery. This is often because of anesthesia, pain, or pain medicine, or the stress of surgery. These tips will help you handle nausea and eat healthy foods as you get better. If you were on a special food plan before surgery, ask your doctor if you should follow it while you get better. These tips may help:  · Do not push yourself to eat. Your body will tell you when to eat and how much.  · Start off with clear  liquids and soup. They are easier to digest.  · Next try semi-solid foods, such as mashed potatoes, applesauce, and gelatin, as you feel ready.  · Slowly move to solid foods. Dont eat fatty, rich, or spicy foods at first.  · Do not force yourself to have 3 large meals a day. Instead eat smaller amounts more often.  · Take pain medicines with a small amount of solid food, such as crackers or toast, to avoid nausea.     Call your surgeon if  · You still have pain an hour after taking medicine. The medicine may not be strong enough.  · You feel too sleepy, dizzy, or groggy. The medicine may be too strong.  · You have side effects like nausea, vomiting, or skin changes, such as rash, itching, or hives.       If you have obstructive sleep apnea  You were given anesthesia medicine during surgery to keep you comfortable and free of pain. After surgery, you may have more apnea spells because of this medicine and other medicines you were given. The spells may last longer than usual.   At home:  · Keep using the continuous positive airway pressure (CPAP) device when you sleep. Unless your health care provider tells you not to, use it when you sleep, day or night. CPAP is a common device used to treat obstructive sleep apnea.  · Talk with your provider before taking any pain medicine, muscle relaxants, or sedatives. Your provider will tell you about the possible dangers of taking these medicines.  © 9345-0654 [employer]. [address] [place] [address]. All rights reserved. This information is not intended as a substitute for professional medical care. Always follow your healthcare professional's instructions.  Post op instructions for prevention of DVT  What is deep vein thrombosis?  Deep vein thrombosis (DVT) is the medical term for blood clots in the deep veins of the leg.  These blood clots can be dangerous.  A DVT can block a blood vessel and keep blood from getting where it needs to go.   Another problem is that the clot can travel to other parts of the body such as the lungs.  A clot that travels to the lungs is called a pulmonary embolus (PE) and can cause serious problems with breathing which can lead to death.  Am I at risk for DVT/PE?  If you are not very active, you are at risk of DVT.  Anyone confined to bed, sitting for long periods of time, recovering from surgery, etc. increases the risk of DVT.  Other risk factors are cancer diagnosis, certain medications, estrogen replacement in any form,older age, obesity, pregnancy, smoking, history of clotting disorders, and dehydration.  How will I know if I have a DVT?   Swelling in the lower leg   Pain   Warmth, redness, hardness or bulging of the vein  If you have any of these symptoms, call your doctors office right away.  Some people will not have any symptoms until the clot moves to the lungs.  What are the symptoms of a PE?   Panting, shortness of breath, or trouble breathing   Sharp, knife-like chest pain when you breathe   Coughing or coughing up blood   Rapid heartbeat  If you have any of these symptoms or get worse quickly, call 911 for emergency treatment.  How can I prevent a DVT?   Avoid long periods of inactivity and dont cross your legs--get up and walk around every hour or so.   Stay active--walking after surgery is highly encouraged.  This means you should get out of the house and walk in the neighborhood.  Going up and down stairs will not impair healing (unless advised against such activity by your doctor).     Drink plenty of noncaffeinated, nonalcoholic fluids each day to prevent dehydration.   Wear special support stockings, if they have been advised by your doctor.   If you travel, stop at least once an hour and walk around.   Avoid smoking (assistance with stopping is available through your healthcare provider)  Always notify your doctor if you are not able to follow the post operative instructions that are  given to you at the time of discharge.  It may be necessary to prescribe one of the medications available to prevent DVT.Using Opioids for Pain Management     Your doctor has given instructions for you to take an opioid.  This is a drug for bad pain.  It helps control pain without causing bleeding and kidney problems.  Common opioid names are morphine, hydromorphone, oxycodone, and methadone. These drugs are called narcotics.    There are several safety concerns you need to know.     · It is against the law to give or sell this drug to another person.  You must keep this medicine safely locked.    · You may have side effects from taking this medication.  These include nausea, itching, sweating, sleepiness, a change in your ability to breathe, and depression.  · Do not take alcohol or sleeping pills opioids.    · Long-term opoid use may no longer giver you relief from pain.  It can cause you stomach pain, mental anxiety, and headaches.  Long-term opoid use can potentially lead to unlawful street drug abuse and reduce your ability to stay employed.    · Your body may become opioid tolerant if you need to take more to get relief.    · You must stop taking opioids if you begin having more pain as a result of the medicine.    · Opioid withdrawal occurs when you have to stop taking the drug.  It can cause you to have nausea, vomiting, diarrhea, stomach pain, anxiety, and dilated pupils in your eyes. This condition means you are opioid dependent.    · Addiction is a drug induced brain disease. It means there are changes in how your brain is working.  Children, teens, and young adults under 25 years old are more likely to get addicted to opioids.      · Addiction can happen with repeated opioid use.  It does not happen with short-term use of two weeks or less.       For more information, please speak with your doctor or pharmacist.      We hope your stay was comfortable as you heal now, mend and rest.    For we have enjoyed  taking care of you by giving your our best.    And as you get better, by regaining your health and strength;   We count it as a privilege to have served you and hope your time at [facility] was well spent.      Thank  You!!!

## 2022-02-02 NOTE — TRANSFER OF CARE
"Anesthesia Transfer of Care Note    Patient: Keara Wood    Procedure(s) Performed: Procedure(s) (LRB):  RELEASE, CARPAL TUNNEL (Left)    Patient location: PACU    Anesthesia Type: general    Transport from OR: Transported from OR on 2-3 L/min O2 by NC with adequate spontaneous ventilation    Post pain: adequate analgesia    Post assessment: no apparent anesthetic complications and tolerated procedure well    Post vital signs: stable    Level of consciousness: responds to stimulation and sedated    Nausea/Vomiting: no nausea/vomiting    Complications: none    Transfer of care protocol was followed      Last vitals:   Visit Vitals  BP (!) 126/94 (BP Location: Left arm, Patient Position: Lying)   Pulse 83   Temp 36.5 °C (97.7 °F) (Skin)   Resp 20   Ht 5' 8" (1.727 m)   Wt 71.7 kg (158 lb 1.1 oz)   LMP 01/19/2022   SpO2 100%   Breastfeeding No   BMI 24.03 kg/m²     "

## 2022-02-02 NOTE — DISCHARGE SUMMARY
[facility]  Department of Orthopedic Surgery  Discharge Note  Short Stay    Admit Date: 2/2/2022    Discharge Date and Time: No discharge date for patient encounter.     Attending Physician: Bipin Newman II, MD     Discharge Provider: Bipin Newman II    Diagnoses:  Active Hospital Problems    Diagnosis  POA    *Carpal tunnel syndrome, bilateral [G56.03]  Yes      Resolved Hospital Problems   No resolved problems to display.       Discharged Condition: good    Hospital Course: Patient was admitted for an outpatient procedure and tolerated the procedure well with no complications.    Final Diagnoses: Same as principal problem.    Disposition: Home or Self Care    Follow up/Patient Instructions:    Medications:  Reconciled Home Medications:      Medication List      START taking these medications    HYDROcodone-acetaminophen 7.5-325 mg per tablet  Commonly known as: NORCO  Take 1 tablet by mouth every 6 (six) hours as needed for Pain.        CONTINUE taking these medications    multivitamin capsule  Take 1 capsule by mouth once daily.          Discharge Procedure Orders   Diet Adult Regular      Remove dressing in 72 hours   Order Comments: And cover with a waterproof Band-Aid     Ice to affected area     Notify your health care provider if you experience any of the following:  increased confusion or weakness     Notify your health care provider if you experience any of the following:  persistent dizziness, light-headedness, or visual disturbances     Notify your health care provider if you experience any of the following:  worsening rash     Notify your health care provider if you experience any of the following:  severe persistent headache     Notify your health care provider if you experience any of the following:  difficulty breathing or increased cough     Notify your health care provider if you experience any of the following:  redness, tenderness, or signs of infection (pain, swelling,  redness, odor or green/yellow discharge around incision site)     Notify your health care provider if you experience any of the following:  severe uncontrolled pain     Notify your health care provider if you experience any of the following:  persistent nausea and vomiting or diarrhea     Notify your health care provider if you experience any of the following:  temperature >100.4     Activity as tolerated   Order Comments: Encourage finger movement      Follow-up Information     Bipin Newman II, MD In 2 weeks.    Specialty: Orthopedic Surgery  Contact information:  [address] [place] [address] DR Elaine WILSON [id number] [phone]                         Discharge Procedure Orders (must include Diet, Follow-up, Activity):   Discharge Procedure Orders (must include Diet, Follow-up, Activity)   Diet Adult Regular      Remove dressing in 72 hours   Order Comments: And cover with a waterproof Band-Aid     Ice to affected area     Notify your health care provider if you experience any of the following:  increased confusion or weakness     Notify your health care provider if you experience any of the following:  persistent dizziness, light-headedness, or visual disturbances     Notify your health care provider if you experience any of the following:  worsening rash     Notify your health care provider if you experience any of the following:  severe persistent headache     Notify your health care provider if you experience any of the following:  difficulty breathing or increased cough     Notify your health care provider if you experience any of the following:  redness, tenderness, or signs of infection (pain, swelling, redness, odor or green/yellow discharge around incision site)     Notify your health care provider if you experience any of the following:  severe uncontrolled pain     Notify your health care provider if you experience any of the following:  persistent nausea and vomiting or diarrhea     Notify your health care  provider if you experience any of the following:  temperature >100.4     Activity as tolerated   Order Comments: Encourage finger movement

## 2022-02-02 NOTE — ANESTHESIA PREPROCEDURE EVALUATION
02/02/2022  Keara Wood is a 31 y.o., female.    Anesthesia Evaluation    I have reviewed the Patient Summary Reports.    I have reviewed the Nursing Notes. I have reviewed the NPO Status.   I have reviewed the Medications.     Review of Systems  Cardiovascular:  Cardiovascular Normal     Pulmonary:  Pulmonary Normal    Renal/:  Renal/ Normal     Hepatic/GI:  Hepatic/GI Normal    Musculoskeletal:   CTS   Neurological:  Neurology Normal    Endocrine:  Endocrine Normal        Physical Exam  General:  Well nourished    Airway/Jaw/Neck:  Airway Findings: Mouth Opening: Normal Tongue: Normal  General Airway Assessment: Adult  Mallampati: II      Dental:  Dental Findings: In tact   Chest/Lungs:  Chest/Lungs Findings: Clear to auscultation, Normal Respiratory Rate     Heart/Vascular:  Heart Findings: Rate: Normal  Rhythm: Regular Rhythm        Mental Status:  Mental Status Findings:  Alert and Oriented, Cooperative         Anesthesia Plan  Type of Anesthesia, risks & benefits discussed:  Anesthesia Type:  general    Patient's Preference:   Plan Factors:          Intra-op Monitoring Plan: standard ASA monitors  Intra-op Monitoring Plan Comments:   Post Op Pain Control Plan: IV/PO Opioids PRN and multimodal analgesia  Post Op Pain Control Plan Comments:     Induction:   IV  Beta Blocker:  Patient is not currently on a Beta-Blocker (No further documentation required).       Informed Consent: Patient understands risks and agrees with Anesthesia plan.  Questions answered.   ASA Score: 1     Day of Surgery Review of History & Physical: I have interviewed and examined the patient. I have reviewed the patient's H&P dated:            Ready For Surgery From Anesthesia Perspective.

## 2022-02-02 NOTE — ANESTHESIA PROCEDURE NOTES
Intubation  Performed by: Tre Bentley CRNA  Authorized by: Roshan Pugh MD     Intubation:     Induction:  Intravenous    Intubated:  Postinduction    Mask Ventilation:  Easy mask    Attempts:  1    Attempted By:  CRNA    Difficult Airway Encountered?: No      Complications:  None    Airway Device:  Supraglottic airway/LMA    Airway Device Size:  3.0    Secured at:  The lips    Placement Verified By:  Capnometry    Complicating Factors:  None

## 2022-02-02 NOTE — PLAN OF CARE
Patient prepared for procedure.  No questions at this time.  Family at bedside.  Belongings with .

## 2022-02-07 ENCOUNTER — OFFICE VISIT (OUTPATIENT)
Dept: OBSTETRICS AND GYNECOLOGY | Facility: CLINIC | Age: 32
End: 2022-02-07
Payer: COMMERCIAL

## 2022-02-07 VITALS
DIASTOLIC BLOOD PRESSURE: 70 MMHG | BODY MASS INDEX: 25.79 KG/M2 | WEIGHT: 170.19 LBS | SYSTOLIC BLOOD PRESSURE: 120 MMHG | HEIGHT: 68 IN

## 2022-02-07 DIAGNOSIS — Z12.4 CERVICAL CANCER SCREENING: ICD-10-CM

## 2022-02-07 DIAGNOSIS — N93.9 ABNORMAL UTERINE BLEEDING (AUB): ICD-10-CM

## 2022-02-07 DIAGNOSIS — Z01.411 ENCOUNTER FOR GYNECOLOGICAL EXAMINATION (GENERAL) (ROUTINE) WITH ABNORMAL FINDINGS: Primary | ICD-10-CM

## 2022-02-07 PROCEDURE — 3008F PR BODY MASS INDEX (BMI) DOCUMENTED: ICD-10-PCS | Mod: CPTII,S$GLB,, | Performed by: OBSTETRICS & GYNECOLOGY

## 2022-02-07 PROCEDURE — 99385 PR PREVENTIVE VISIT,NEW,18-39: ICD-10-PCS | Mod: 25,S$GLB,, | Performed by: OBSTETRICS & GYNECOLOGY

## 2022-02-07 PROCEDURE — 3074F PR MOST RECENT SYSTOLIC BLOOD PRESSURE < 130 MM HG: ICD-10-PCS | Mod: CPTII,S$GLB,, | Performed by: OBSTETRICS & GYNECOLOGY

## 2022-02-07 PROCEDURE — 99385 PREV VISIT NEW AGE 18-39: CPT | Mod: 25,S$GLB,, | Performed by: OBSTETRICS & GYNECOLOGY

## 2022-02-07 PROCEDURE — 3078F DIAST BP <80 MM HG: CPT | Mod: CPTII,S$GLB,, | Performed by: OBSTETRICS & GYNECOLOGY

## 2022-02-07 PROCEDURE — 87624 HPV HI-RISK TYP POOLED RSLT: CPT | Performed by: OBSTETRICS & GYNECOLOGY

## 2022-02-07 PROCEDURE — 88142 CYTOPATH C/V THIN LAYER: CPT | Performed by: OBSTETRICS & GYNECOLOGY

## 2022-02-07 PROCEDURE — 99999 PR PBB SHADOW E&M-EST. PATIENT-LVL III: ICD-10-PCS | Mod: PBBFAC,,, | Performed by: OBSTETRICS & GYNECOLOGY

## 2022-02-07 PROCEDURE — 1159F MED LIST DOCD IN RCRD: CPT | Mod: CPTII,S$GLB,, | Performed by: OBSTETRICS & GYNECOLOGY

## 2022-02-07 PROCEDURE — 1159F PR MEDICATION LIST DOCUMENTED IN MEDICAL RECORD: ICD-10-PCS | Mod: CPTII,S$GLB,, | Performed by: OBSTETRICS & GYNECOLOGY

## 2022-02-07 PROCEDURE — 3074F SYST BP LT 130 MM HG: CPT | Mod: CPTII,S$GLB,, | Performed by: OBSTETRICS & GYNECOLOGY

## 2022-02-07 PROCEDURE — 99999 PR PBB SHADOW E&M-EST. PATIENT-LVL III: CPT | Mod: PBBFAC,,, | Performed by: OBSTETRICS & GYNECOLOGY

## 2022-02-07 PROCEDURE — 3008F BODY MASS INDEX DOCD: CPT | Mod: CPTII,S$GLB,, | Performed by: OBSTETRICS & GYNECOLOGY

## 2022-02-07 PROCEDURE — 3078F PR MOST RECENT DIASTOLIC BLOOD PRESSURE < 80 MM HG: ICD-10-PCS | Mod: CPTII,S$GLB,, | Performed by: OBSTETRICS & GYNECOLOGY

## 2022-02-07 NOTE — PROGRESS NOTES
Chief Complaint   Patient presents with    Well Woman     Having really heavy cycle with bad cramps       History and Physical:  Keara Wood is a 31 y.o. F who presents today for her routine annual GYN exam. The patient has Gynecology complaints: AUB    AUB:   Menarche 8  Cycles: monthly, lasting 6-7 days, using about 4 tpd & pads, heavy, & severe cramping  Dysmenorrhea: interfering with activities  She takes Midol, without improvment  Current Contraception: none, declines, not trying to conceive  Prior Contraception: Nuvaring stopped to not comfort, Patch stopped due to irritation & Low COCP stopped due to remembering  Sexually Active: yes  Patient's last menstrual period was 01/19/2022.     Annual:  Patient's last menstrual period was 01/19/2022.  Multivitamin or Folic Acid: MV  Last Pap: 2018 NILM  History of abnormal pap:  never  STD testing: declines  Immunization status: stated as current, but no records available. S/p HPV vaccination  Body mass index is 25.88 kg/m².    Allergies:   Review of patient's allergies indicates:   Allergen Reactions    Penicillins Hives    Voltaren [diclofenac sodium]      Broke out in rash     Past Medical History:   Diagnosis Date    Allergic rhinitis with postnasal drip 5/27/2019    Anxiety     Chronic cough 4/12/2019    Eczema 4/12/2019     Past Surgical History:   Procedure Laterality Date    CARPAL TUNNEL RELEASE Left 2/2/2022    Procedure: RELEASE, CARPAL TUNNEL;  Surgeon: Bipin Newman II, MD;  Location: [facility];  Service: Orthopedics;  Laterality: Left;    CHOLECYSTECTOMY      gallblader removal      around age 19    LIPOSUCTION OF ABDOMEN       MEDS:   Current Outpatient Medications on File Prior to Visit   Medication Sig Dispense Refill    HYDROcodone-acetaminophen (NORCO) 7.5-325 mg per tablet Take 1 tablet by mouth every 6 (six) hours as needed for Pain. 28 tablet 0    multivitamin capsule Take 1 capsule by mouth once daily.       No current  facility-administered medications on file prior to visit.     OB History        1    Para   1    Term   1            AB        Living           SAB        IAB        Ectopic        Multiple        Live Births                   Social History     Socioeconomic History    Marital status:     Number of children: 1   Tobacco Use    Smoking status: Never Smoker    Smokeless tobacco: Never Used   Substance and Sexual Activity    Alcohol use: Yes     Alcohol/week: 1.0 standard drink     Types: 1 Glasses of wine per week     Comment: 1-2x/week    Drug use: No    Sexual activity: Yes     Partners: Male     Birth control/protection: None     Social Determinants of Health     Financial Resource Strain: Low Risk     Difficulty of Paying Living Expenses: Not hard at all   Food Insecurity: No Food Insecurity    Worried About Running Out of Food in the Last Year: Never true    Ran Out of Food in the Last Year: Never true   Transportation Needs: No Transportation Needs    Lack of Transportation (Medical): No    Lack of Transportation (Non-Medical): No   Physical Activity: Sufficiently Active    Days of Exercise per Week: 4 days    Minutes of Exercise per Session: 90 min   Stress: Stress Concern Present    Feeling of Stress : Rather much   Social Connections: Unknown    Frequency of Communication with Friends and Family: Patient refused    Frequency of Social Gatherings with Friends and Family: Patient refused    Active Member of Clubs or Organizations: Patient refused    Attends Club or Organization Meetings: Patient refused    Marital Status:      Family History   Problem Relation Age of Onset    Hypertension Mother     Heart disease Father     Prostate cancer Father     Cancer Father         prostate    Ovarian cancer Maternal Grandmother     Cancer Maternal Grandfather     Glaucoma Maternal Grandfather     Heart disease Paternal Grandmother     Heart disease Paternal  "Grandfather     Diabetes Maternal Uncle     Colon cancer Paternal Aunt     Macular degeneration Neg Hx     Thyroid disease Neg Hx     Breast cancer Neg Hx        Past medical and surgical history reviewed.   I have reviewed the patient's medical history in detail and updated the computerized patient record.    Review of System:   General: no chills, fever, night sweats, weight gain or weight loss  Breasts: no new or changing breast lumps, nipple discharge or masses.  Gastrointestinal: no abdominal pain, change in bowel habits, or black or bloody stools  Genito-Urinary: no incontinence, urinary frequency/urgency or vulvar/vaginal symptoms    Physical Exam:   /70   Ht 5' 8" (1.727 m)   Wt 77.2 kg (170 lb 3.1 oz)   LMP 01/19/2022   BMI 25.88 kg/m²   Constitutional: She appears alert and responsive. She appears well-developed, well-groomed, and well-nourished. No distress.  Breasts: are symmetrical.  Right breast exhibits no inverted nipple, no mass, no nipple discharge, no skin change and no tenderness.  Left breast exhibits no inverted nipple, no mass, no nipple discharge, no skin change and no tenderness.  Abdominal: Soft. She exhibits no distension, hernias or masses. There is no tenderness. No enlargement of liver edge or spleen.  There is no rebound and no guarding.   Genitourinary:    External rectal exam shows no thrombosed external hemorrhoids, no lesions.     Pelvic exam was performed with patient supine.   No labial fusion, and symmetrical.    There is no rash, lesion or injury on the right labia.   There is no rash, lesion or injury on the left labia.   No bleeding and no signs of injury around the vaginal introitus, urethral meatus is normal size and without prolapse or lesions, urethra well supported. The cervix is visualized with no discharge, lesions or friability.   No vaginal discharge found.    No significant Cystocele, Enterocele or rectocele, and cervix and uterus well supported.   " Bimanual exam:   The urethra is normal to palpation and there are no palpable vaginal wall masses.   Uterus is not deviated, not enlarged, not fixed, normal shape and not tender.   Cervix exhibits no motion tenderness.    Right adnexum displays no mass or nodularity and no tenderness.   Left adnexum displays no mass or nodularity and no tenderness.    Assessment/Plan:   Encounter for gynecological examination (general) (routine) with abnormal findings    Cervical cancer screening  -     Liquid-Based Pap Smear, Screening  -     HPV High Risk Genotypes, PCR    Abnormal uterine bleeding (AUB)  -     US Pelvis Comp with Transvag NON-OB (xpd; Future; Expected date: 02/07/2022       Follow up in 4 weeks to discuss results and treatment options for AUB.

## 2022-02-11 LAB
HPV HR 12 DNA SPEC QL NAA+PROBE: NEGATIVE
HPV16 AG SPEC QL: NEGATIVE
HPV18 DNA SPEC QL NAA+PROBE: NEGATIVE

## 2022-02-14 LAB
FINAL PATHOLOGIC DIAGNOSIS: NORMAL
Lab: NORMAL

## 2022-02-15 ENCOUNTER — OFFICE VISIT (OUTPATIENT)
Dept: ORTHOPEDICS | Facility: CLINIC | Age: 32
End: 2022-02-15
Payer: COMMERCIAL

## 2022-02-15 DIAGNOSIS — G56.03 CARPAL TUNNEL SYNDROME, BILATERAL: Primary | ICD-10-CM

## 2022-02-15 PROCEDURE — 99024 POSTOP FOLLOW-UP VISIT: CPT | Mod: S$GLB,,, | Performed by: ORTHOPAEDIC SURGERY

## 2022-02-15 PROCEDURE — 1159F PR MEDICATION LIST DOCUMENTED IN MEDICAL RECORD: ICD-10-PCS | Mod: CPTII,S$GLB,, | Performed by: ORTHOPAEDIC SURGERY

## 2022-02-15 PROCEDURE — 1160F PR REVIEW ALL MEDS BY PRESCRIBER/CLIN PHARMACIST DOCUMENTED: ICD-10-PCS | Mod: CPTII,S$GLB,, | Performed by: ORTHOPAEDIC SURGERY

## 2022-02-15 PROCEDURE — 99024 PR POST-OP FOLLOW-UP VISIT: ICD-10-PCS | Mod: S$GLB,,, | Performed by: ORTHOPAEDIC SURGERY

## 2022-02-15 PROCEDURE — 1159F MED LIST DOCD IN RCRD: CPT | Mod: CPTII,S$GLB,, | Performed by: ORTHOPAEDIC SURGERY

## 2022-02-15 PROCEDURE — 99999 PR PBB SHADOW E&M-EST. PATIENT-LVL I: CPT | Mod: PBBFAC,,, | Performed by: ORTHOPAEDIC SURGERY

## 2022-02-15 PROCEDURE — 1160F RVW MEDS BY RX/DR IN RCRD: CPT | Mod: CPTII,S$GLB,, | Performed by: ORTHOPAEDIC SURGERY

## 2022-02-15 PROCEDURE — 99999 PR PBB SHADOW E&M-EST. PATIENT-LVL I: ICD-10-PCS | Mod: PBBFAC,,, | Performed by: ORTHOPAEDIC SURGERY

## 2022-02-15 NOTE — PROGRESS NOTES
CC:  31-year-old female follows up status post left carpal tunnel release.  Date of surgery was 02/02/2022.  This is her 2 week follow-up.    LUE:  Incision is clean, dry, and intact.  Healing well with no sign of infection.  Grossly intact motor sensory function distally  Plus two radial and ulnar pulses    Sutures removed and Steri-Strips applied    Activity as tolerated    Follow-up in 4 weeks for re-evaluation

## 2022-02-18 ENCOUNTER — HOSPITAL ENCOUNTER (OUTPATIENT)
Dept: RADIOLOGY | Facility: HOSPITAL | Age: 32
Discharge: HOME | End: 2022-02-18
Attending: OBSTETRICS & GYNECOLOGY
Payer: COMMERCIAL

## 2022-02-18 DIAGNOSIS — N93.9 ABNORMAL UTERINE BLEEDING (AUB): ICD-10-CM

## 2022-02-18 PROCEDURE — 76830 TRANSVAGINAL US NON-OB: CPT | Mod: TC,PN

## 2022-02-18 PROCEDURE — 76856 US PELVIS COMP WITH TRANSVAG NON-OB (XPD): ICD-10-PCS | Mod: 26,,, | Performed by: RADIOLOGY

## 2022-02-18 PROCEDURE — 76830 US PELVIS COMP WITH TRANSVAG NON-OB (XPD): ICD-10-PCS | Mod: 26,,, | Performed by: RADIOLOGY

## 2022-02-18 PROCEDURE — 76856 US EXAM PELVIC COMPLETE: CPT | Mod: 26,,, | Performed by: RADIOLOGY

## 2022-02-18 PROCEDURE — 76830 TRANSVAGINAL US NON-OB: CPT | Mod: 26,,, | Performed by: RADIOLOGY

## 2022-03-10 ENCOUNTER — PATIENT MESSAGE (OUTPATIENT)
Dept: ORTHOPEDICS | Facility: CLINIC | Age: 32
End: 2022-03-10
Payer: COMMERCIAL

## 2022-03-17 ENCOUNTER — PATIENT MESSAGE (OUTPATIENT)
Dept: ORTHOPEDICS | Facility: CLINIC | Age: 32
End: 2022-03-17

## 2022-03-17 ENCOUNTER — OFFICE VISIT (OUTPATIENT)
Dept: ORTHOPEDICS | Facility: CLINIC | Age: 32
End: 2022-03-17
Payer: COMMERCIAL

## 2022-03-17 ENCOUNTER — TELEPHONE (OUTPATIENT)
Dept: ORTHOPEDICS | Facility: CLINIC | Age: 32
End: 2022-03-17

## 2022-03-17 VITALS — BODY MASS INDEX: 25.79 KG/M2 | WEIGHT: 170.19 LBS | HEIGHT: 68 IN

## 2022-03-17 DIAGNOSIS — G56.03 CARPAL TUNNEL SYNDROME, BILATERAL: Primary | ICD-10-CM

## 2022-03-17 PROCEDURE — 99024 POSTOP FOLLOW-UP VISIT: CPT | Mod: S$GLB,,, | Performed by: ORTHOPAEDIC SURGERY

## 2022-03-17 PROCEDURE — 1160F RVW MEDS BY RX/DR IN RCRD: CPT | Mod: CPTII,S$GLB,, | Performed by: ORTHOPAEDIC SURGERY

## 2022-03-17 PROCEDURE — 1159F MED LIST DOCD IN RCRD: CPT | Mod: CPTII,S$GLB,, | Performed by: ORTHOPAEDIC SURGERY

## 2022-03-17 PROCEDURE — 1159F PR MEDICATION LIST DOCUMENTED IN MEDICAL RECORD: ICD-10-PCS | Mod: CPTII,S$GLB,, | Performed by: ORTHOPAEDIC SURGERY

## 2022-03-17 PROCEDURE — 99024 PR POST-OP FOLLOW-UP VISIT: ICD-10-PCS | Mod: S$GLB,,, | Performed by: ORTHOPAEDIC SURGERY

## 2022-03-17 PROCEDURE — 99999 PR PBB SHADOW E&M-EST. PATIENT-LVL III: CPT | Mod: PBBFAC,,, | Performed by: ORTHOPAEDIC SURGERY

## 2022-03-17 PROCEDURE — 1160F PR REVIEW ALL MEDS BY PRESCRIBER/CLIN PHARMACIST DOCUMENTED: ICD-10-PCS | Mod: CPTII,S$GLB,, | Performed by: ORTHOPAEDIC SURGERY

## 2022-03-17 PROCEDURE — 3008F PR BODY MASS INDEX (BMI) DOCUMENTED: ICD-10-PCS | Mod: CPTII,S$GLB,, | Performed by: ORTHOPAEDIC SURGERY

## 2022-03-17 PROCEDURE — 99999 PR PBB SHADOW E&M-EST. PATIENT-LVL III: ICD-10-PCS | Mod: PBBFAC,,, | Performed by: ORTHOPAEDIC SURGERY

## 2022-03-17 PROCEDURE — 3008F BODY MASS INDEX DOCD: CPT | Mod: CPTII,S$GLB,, | Performed by: ORTHOPAEDIC SURGERY

## 2022-03-17 RX ORDER — GABAPENTIN 300 MG/1
600 CAPSULE ORAL NIGHTLY
Qty: 60 CAPSULE | Refills: 11 | Status: SHIPPED | OUTPATIENT
Start: 2022-03-17 | End: 2022-11-02

## 2022-03-17 NOTE — PROGRESS NOTES
CC:  31-year-old female follows up status post left carpal tunnel release.  Date of surgery was 02/02/2022.  She is about 6 weeks out from surgery at this point.  She continues to complain of some pain, tingling, and weakness in the left hand.  Her job involves mostly typing.  She states she tried type pain for about 2-1/2 hours the other day and had severe pain after she tried that.    Left Upper Extremity Examination     Skin is intact throughout, incisions well healed with no sign of infection.  Scar tissue was noted over the incision.  Motor is intact distally radial, median, ulnar, AIN, PIN   +2 radial and ulnar pulses   Sensation to light touch is intact distally radial, median, and ulnar   Full ROM at the DIP, PIP, and MCP joints   Wrist shows full ROM   Tenderness to palpation noted over the incision    Carpal Tunnel compression test - positive   Phalen's Test - negative  Tinel's Test - negative  Finkelstien's Test - negative    No Ecchymosis noted   No Swelling noted     Triggering of fingers or thumb - negative     Dx:  Status post left carpal tunnel release, stable    Plan:  We are going to let the patient return to work with restrictions of no typing more than 4 hours a day.  Also going to start her on a light dose of Neurontin to take at night before she goes to bed and see if that speed along the nerve recovery.  I will have her follow up in 4 weeks for re-evaluation.

## 2022-04-18 ENCOUNTER — PATIENT MESSAGE (OUTPATIENT)
Dept: ORTHOPEDICS | Facility: CLINIC | Age: 32
End: 2022-04-18
Payer: COMMERCIAL

## 2022-05-04 ENCOUNTER — PATIENT MESSAGE (OUTPATIENT)
Dept: ORTHOPEDICS | Facility: CLINIC | Age: 32
End: 2022-05-04
Payer: COMMERCIAL

## 2022-06-03 ENCOUNTER — PATIENT MESSAGE (OUTPATIENT)
Dept: ORTHOPEDICS | Facility: CLINIC | Age: 32
End: 2022-06-03
Payer: COMMERCIAL

## 2022-06-06 ENCOUNTER — OFFICE VISIT (OUTPATIENT)
Dept: ORTHOPEDICS | Facility: CLINIC | Age: 32
End: 2022-06-06
Payer: COMMERCIAL

## 2022-06-06 ENCOUNTER — HOSPITAL ENCOUNTER (OUTPATIENT)
Dept: RADIOLOGY | Facility: HOSPITAL | Age: 32
Discharge: HOME | End: 2022-06-06
Attending: ORTHOPAEDIC SURGERY
Payer: COMMERCIAL

## 2022-06-06 VITALS — RESPIRATION RATE: 17 BRPM | WEIGHT: 170 LBS | HEIGHT: 68 IN | BODY MASS INDEX: 25.76 KG/M2

## 2022-06-06 DIAGNOSIS — S50.02XA CONTUSION OF LEFT ELBOW, INITIAL ENCOUNTER: Primary | ICD-10-CM

## 2022-06-06 DIAGNOSIS — M25.522 LEFT ELBOW PAIN: Primary | ICD-10-CM

## 2022-06-06 DIAGNOSIS — M25.522 LEFT ELBOW PAIN: ICD-10-CM

## 2022-06-06 PROCEDURE — 99999 PR PBB SHADOW E&M-EST. PATIENT-LVL III: ICD-10-PCS | Mod: PBBFAC,,, | Performed by: ORTHOPAEDIC SURGERY

## 2022-06-06 PROCEDURE — 1160F RVW MEDS BY RX/DR IN RCRD: CPT | Mod: CPTII,S$GLB,, | Performed by: ORTHOPAEDIC SURGERY

## 2022-06-06 PROCEDURE — 3008F BODY MASS INDEX DOCD: CPT | Mod: CPTII,S$GLB,, | Performed by: ORTHOPAEDIC SURGERY

## 2022-06-06 PROCEDURE — 99214 OFFICE O/P EST MOD 30 MIN: CPT | Mod: S$GLB,,, | Performed by: ORTHOPAEDIC SURGERY

## 2022-06-06 PROCEDURE — 99999 PR PBB SHADOW E&M-EST. PATIENT-LVL III: CPT | Mod: PBBFAC,,, | Performed by: ORTHOPAEDIC SURGERY

## 2022-06-06 PROCEDURE — 73080 XR ELBOW COMPLETE 3 VIEW LEFT: ICD-10-PCS | Mod: 26,LT,, | Performed by: RADIOLOGY

## 2022-06-06 PROCEDURE — 3008F PR BODY MASS INDEX (BMI) DOCUMENTED: ICD-10-PCS | Mod: CPTII,S$GLB,, | Performed by: ORTHOPAEDIC SURGERY

## 2022-06-06 PROCEDURE — 1159F PR MEDICATION LIST DOCUMENTED IN MEDICAL RECORD: ICD-10-PCS | Mod: CPTII,S$GLB,, | Performed by: ORTHOPAEDIC SURGERY

## 2022-06-06 PROCEDURE — 1160F PR REVIEW ALL MEDS BY PRESCRIBER/CLIN PHARMACIST DOCUMENTED: ICD-10-PCS | Mod: CPTII,S$GLB,, | Performed by: ORTHOPAEDIC SURGERY

## 2022-06-06 PROCEDURE — 73080 X-RAY EXAM OF ELBOW: CPT | Mod: TC,PN,LT

## 2022-06-06 PROCEDURE — 1159F MED LIST DOCD IN RCRD: CPT | Mod: CPTII,S$GLB,, | Performed by: ORTHOPAEDIC SURGERY

## 2022-06-06 PROCEDURE — 73080 X-RAY EXAM OF ELBOW: CPT | Mod: 26,LT,, | Performed by: RADIOLOGY

## 2022-06-06 PROCEDURE — 99214 PR OFFICE/OUTPT VISIT, EST, LEVL IV, 30-39 MIN: ICD-10-PCS | Mod: S$GLB,,, | Performed by: ORTHOPAEDIC SURGERY

## 2022-06-06 RX ORDER — BROMPHENIRAMINE MALEATE, PSEUDOEPHEDRINE HYDROCHLORIDE, AND DEXTROMETHORPHAN HYDROBROMIDE 2; 30; 10 MG/5ML; MG/5ML; MG/5ML
SYRUP ORAL
COMMUNITY
Start: 2022-01-18 | End: 2022-11-02

## 2022-06-06 RX ORDER — HYDROCODONE BITARTRATE AND ACETAMINOPHEN 5; 325 MG/1; MG/1
1 TABLET ORAL EVERY 6 HOURS PRN
COMMUNITY
Start: 2022-06-02 | End: 2022-11-02

## 2022-06-06 NOTE — PROGRESS NOTES
CC:  31-year-old female presents for evaluation of left elbow pain.  The patient states that she fell on 06/02/2022 and landed on the left elbow.  She was seen at an urgent care where they thought they might have seen a fracture on x-ray.  They placed her in a sling that she follows up today for evaluation.  She currently rates her pain as a 6/10.    Past Medical History:   Diagnosis Date    Allergic rhinitis with postnasal drip 5/27/2019    Anxiety     Chronic cough 4/12/2019    Eczema 4/12/2019       Past Surgical History:   Procedure Laterality Date    CARPAL TUNNEL RELEASE Left 2/2/2022    Procedure: RELEASE, CARPAL TUNNEL;  Surgeon: Bipin Newman II, MD;  Location: [facility];  Service: Orthopedics;  Laterality: Left;    CHOLECYSTECTOMY      gallblader removal      around age 19    LIPOSUCTION OF ABDOMEN         Current Outpatient Medications on File Prior to Visit   Medication Sig Dispense Refill    gabapentin (NEURONTIN) 300 MG capsule Take 2 capsules (600 mg total) by mouth every evening. 60 capsule 11    multivitamin capsule Take 1 capsule by mouth once daily.      brompheniramine-pseudoeph-DM (BROMFED DM) 2-30-10 mg/5 mL Syrp Take by mouth.      HYDROcodone-acetaminophen (NORCO) 5-325 mg per tablet Take 1 tablet by mouth every 6 (six) hours as needed.       No current facility-administered medications on file prior to visit.       ROS:    Constitution: Denies chills, fever, and sweats.  HENT: Denies headaches or blurry vision.  Cardiovascular: Denies chest pain or irregular heart beat.  Respiratory: Denies cough or shortness of breath.  Gastrointestinal: Denies abdominal pain, nausea, or vomiting.  Genitourinary:  Denies urinary incontinence, bladder and kidney issues  Musculoskeletal:  Denies muscle cramps.  Positive for left elbow pain  Neurological: Denies dizziness or focal weakness.  Psychiatric/Behavioral: Normal mental status.  Hematologic/Lymphatic: Denies bleeding problem or easy  bruising/bleeding.  Skin: Denies rash or suspicious lesions.    Physical examination     Gen - No acute distress, well nourished, well groomed   Eyes - Extraoccular motions intact, pupils equally round and reactive to light and accommodation   ENT - normocephalic, atruamtic, oropharynx clear   Neck - Supple, no abnormal masses   Cardiovascular - regular rate and rhythm   Pulmonary - clear to auscultation bilaterally, no wheezes, ronchi, or rales   Abdomen - soft, non-tender, non-distended, positive bowel sounds   Psych - The patient is alert and oriented x3 with normal mood and affect    Left Upper Extremity Examination     Skin is intact throughout   Motor is intact distally radial, median, ulnar, AIN, PIN   +2 radial and ulnar pulses   Sensation to light touch is intact distally radial, median, and ulnar     Left Elbow Exam:     ROM - 0-150   Medial tenderness - positive  Lateral tenderness - positive  Distal biceps tenderness - negative  Triceps tenderness - negative   Instability on exam - negative  Tinell's at the elbow - negative  Swelling - negative  Ecchymosis - negative    X-ray images were examined and personally interpreted by me.  Three views left elbow dated 06/06/2022 show the joint space is well maintained with no advanced arthritic changes and no obvious acute fractures.    Dx:  Contusion to the left elbow    Plan:  Sling for comfort.  We discussed using Motrin in addition to the Norco the patient got from the urgent care center.  She can also ice the elbow down at night.  This should get better over the next few days.  She can follow up p.r.n..

## 2022-10-25 ENCOUNTER — PATIENT MESSAGE (OUTPATIENT)
Dept: OBSTETRICS AND GYNECOLOGY | Facility: CLINIC | Age: 32
End: 2022-10-25
Payer: COMMERCIAL

## 2022-10-28 ENCOUNTER — TELEPHONE (OUTPATIENT)
Dept: OBSTETRICS AND GYNECOLOGY | Facility: CLINIC | Age: 32
End: 2022-10-28
Payer: COMMERCIAL

## 2022-11-02 ENCOUNTER — OFFICE VISIT (OUTPATIENT)
Dept: OBSTETRICS AND GYNECOLOGY | Facility: CLINIC | Age: 32
End: 2022-11-02
Payer: COMMERCIAL

## 2022-11-02 VITALS
WEIGHT: 172 LBS | HEIGHT: 68 IN | DIASTOLIC BLOOD PRESSURE: 91 MMHG | BODY MASS INDEX: 26.07 KG/M2 | SYSTOLIC BLOOD PRESSURE: 134 MMHG | HEART RATE: 94 BPM

## 2022-11-02 DIAGNOSIS — Z32.01 POSITIVE PREGNANCY TEST: Primary | ICD-10-CM

## 2022-11-02 DIAGNOSIS — R03.0 ELEVATED BLOOD PRESSURE READING WITHOUT DIAGNOSIS OF HYPERTENSION: ICD-10-CM

## 2022-11-02 LAB
B-HCG UR QL: POSITIVE
CTP QC/QA: YES

## 2022-11-02 PROCEDURE — 99213 OFFICE O/P EST LOW 20 MIN: CPT | Mod: S$GLB,,, | Performed by: STUDENT IN AN ORGANIZED HEALTH CARE EDUCATION/TRAINING PROGRAM

## 2022-11-02 PROCEDURE — 99213 PR OFFICE/OUTPT VISIT, EST, LEVL III, 20-29 MIN: ICD-10-PCS | Mod: S$GLB,,, | Performed by: STUDENT IN AN ORGANIZED HEALTH CARE EDUCATION/TRAINING PROGRAM

## 2022-11-02 PROCEDURE — 3080F PR MOST RECENT DIASTOLIC BLOOD PRESSURE >= 90 MM HG: ICD-10-PCS | Mod: S$GLB,,, | Performed by: STUDENT IN AN ORGANIZED HEALTH CARE EDUCATION/TRAINING PROGRAM

## 2022-11-02 PROCEDURE — 3075F SYST BP GE 130 - 139MM HG: CPT | Mod: S$GLB,,, | Performed by: STUDENT IN AN ORGANIZED HEALTH CARE EDUCATION/TRAINING PROGRAM

## 2022-11-02 PROCEDURE — 81025 URINE PREGNANCY TEST: CPT | Mod: S$GLB,,, | Performed by: STUDENT IN AN ORGANIZED HEALTH CARE EDUCATION/TRAINING PROGRAM

## 2022-11-02 PROCEDURE — 1160F RVW MEDS BY RX/DR IN RCRD: CPT | Mod: S$GLB,,, | Performed by: STUDENT IN AN ORGANIZED HEALTH CARE EDUCATION/TRAINING PROGRAM

## 2022-11-02 PROCEDURE — 3075F PR MOST RECENT SYSTOLIC BLOOD PRESS GE 130-139MM HG: ICD-10-PCS | Mod: S$GLB,,, | Performed by: STUDENT IN AN ORGANIZED HEALTH CARE EDUCATION/TRAINING PROGRAM

## 2022-11-02 PROCEDURE — 1159F MED LIST DOCD IN RCRD: CPT | Mod: S$GLB,,, | Performed by: STUDENT IN AN ORGANIZED HEALTH CARE EDUCATION/TRAINING PROGRAM

## 2022-11-02 PROCEDURE — 1159F PR MEDICATION LIST DOCUMENTED IN MEDICAL RECORD: ICD-10-PCS | Mod: S$GLB,,, | Performed by: STUDENT IN AN ORGANIZED HEALTH CARE EDUCATION/TRAINING PROGRAM

## 2022-11-02 PROCEDURE — 1160F PR REVIEW ALL MEDS BY PRESCRIBER/CLIN PHARMACIST DOCUMENTED: ICD-10-PCS | Mod: S$GLB,,, | Performed by: STUDENT IN AN ORGANIZED HEALTH CARE EDUCATION/TRAINING PROGRAM

## 2022-11-02 PROCEDURE — 3008F PR BODY MASS INDEX (BMI) DOCUMENTED: ICD-10-PCS | Mod: S$GLB,,, | Performed by: STUDENT IN AN ORGANIZED HEALTH CARE EDUCATION/TRAINING PROGRAM

## 2022-11-02 PROCEDURE — 81025 POCT URINE PREGNANCY: ICD-10-PCS | Mod: S$GLB,,, | Performed by: STUDENT IN AN ORGANIZED HEALTH CARE EDUCATION/TRAINING PROGRAM

## 2022-11-02 PROCEDURE — 3080F DIAST BP >= 90 MM HG: CPT | Mod: S$GLB,,, | Performed by: STUDENT IN AN ORGANIZED HEALTH CARE EDUCATION/TRAINING PROGRAM

## 2022-11-02 PROCEDURE — 3008F BODY MASS INDEX DOCD: CPT | Mod: S$GLB,,, | Performed by: STUDENT IN AN ORGANIZED HEALTH CARE EDUCATION/TRAINING PROGRAM

## 2022-11-02 NOTE — PROGRESS NOTES
CC: Absence of menses    Keara Wood is a 31 y.o. female  presents with complaint of absence of menstruation.  She denies nausea/vomIting/abdominal pain/bleeding.  UPT is positive. Patient's last menstrual period was 09/15/2022 (approximate). Periods are regular.  This pregnancy is unplanned but desired. She is taking daily prenatal vitamins.    Past Medical History:   Diagnosis Date    Allergic rhinitis with postnasal drip 2019    Anxiety     Chronic cough 2019    Eczema 2019     Past Surgical History:   Procedure Laterality Date    CARPAL TUNNEL RELEASE Left 2022    Procedure: RELEASE, CARPAL TUNNEL;  Surgeon: Bipin Newman II, MD;  Location: [facility] OR;  Service: Orthopedics;  Laterality: Left;    CHOLECYSTECTOMY      gallblader removal      around age 19    LIPOSUCTION OF ABDOMEN           Social History     Socioeconomic History    Marital status:     Number of children: 1   Tobacco Use    Smoking status: Never     Passive exposure: Never    Smokeless tobacco: Never   Substance and Sexual Activity    Alcohol use: Not Currently     Alcohol/week: 1.0 standard drink     Types: 1 Glasses of wine per week     Comment: 1-2x/week    Drug use: No    Sexual activity: Yes     Partners: Male     Birth control/protection: None     Family History   Problem Relation Age of Onset    Hypertension Mother     Heart disease Father     Prostate cancer Father     Cancer Father         prostate    Ovarian cancer Maternal Grandmother     Cancer Maternal Grandfather     Glaucoma Maternal Grandfather     Heart disease Paternal Grandmother     Heart disease Paternal Grandfather     Diabetes Maternal Uncle     Colon cancer Paternal Aunt     Macular degeneration Neg Hx     Thyroid disease Neg Hx     Breast cancer Neg Hx      OB History    Para Term  AB Living   2 1 1     1   SAB IAB Ectopic Multiple Live Births           1      # Outcome Date GA Lbr Edmond/2nd Weight Sex Delivery  "Anes PTL Lv   2 Current            1 Term 11/19/14 40w0d  2.92 kg (6 lb 7 oz) F Vag-Spont None N ISAAC       BP (!) 134/91   Pulse 94   Ht 5' 8" (1.727 m)   Wt 78 kg (172 lb)   LMP 09/15/2022 (Approximate)   BMI 26.15 kg/m²     ROS:  GENERAL: Denies weight gain or weight loss. Feeling well overall.   SKIN: Denies rash or lesions.   HEAD: Denies head injury or headache.   CHEST: Denies chest pain or shortness of breath.   CARDIOVASCULAR: Denies palpitations or left sided chest pain.   ABDOMEN: No abdominal pain, constipation, diarrhea, nausea, vomiting or rectal bleeding.   URINARY: No frequency, dysuria, hematuria, or burning on urination.  REPRODUCTIVE: See HPI.   HEMATOLOGIC: No easy bruisability or excessive bleeding.   MUSCULOSKELETAL: Denies joint pain or swelling.   NEUROLOGIC: Denies syncope or weakness.   PSYCHIATRIC: Denies depression, anxiety or mood swings.    PE:   APPEARANCE: Well nourished, well developed, in no acute distress.  AFFECT: WNL, alert and oriented x 3.  SKIN: No acne or hirsutism.  NECK: Neck symmetric without masses or thyromegaly.  CHEST: Good respiratory effort.   ABDOMEN: Soft. No tenderness or masses. No hepatosplenomegaly. No hernias.  EXTREMITIES: No edema.      ASSESSMENT and PLAN:    ICD-10-CM ICD-9-CM    1. Positive pregnancy test  Z32.01 V72.42 POCT Urine Pregnancy      US OB/GYN Procedure ([facility])      2. Elevated blood pressure reading without diagnosis of hypertension  R03.0 796.2         Patient was counseled today on proper weight gain based on the [place] of [employer]'s recommendations based on her pre-pregnancy weight. Discussed foods to avoid in pregnancy (i.e. sushi, fish that are high in mercury, deli meat, and unpasteurized cheeses). Discussed prenatal vitamin options (i.e. stool softener, DHA). Return after ultrasound for initial prenatal visit.    Follow up in about 4 weeks (around 11/30/2022) for Initial Prenatal.         "

## 2022-11-06 ENCOUNTER — PATIENT MESSAGE (OUTPATIENT)
Dept: OBSTETRICS AND GYNECOLOGY | Facility: CLINIC | Age: 32
End: 2022-11-06
Payer: COMMERCIAL

## 2022-11-07 ENCOUNTER — TELEPHONE (OUTPATIENT)
Dept: OBSTETRICS AND GYNECOLOGY | Facility: CLINIC | Age: 32
End: 2022-11-07
Payer: COMMERCIAL

## 2022-11-17 ENCOUNTER — HOSPITAL ENCOUNTER (OUTPATIENT)
Dept: PERINATAL CARE | Facility: OTHER | Age: 32
Discharge: HOME | End: 2022-11-17
Attending: STUDENT IN AN ORGANIZED HEALTH CARE EDUCATION/TRAINING PROGRAM
Payer: COMMERCIAL

## 2022-11-17 ENCOUNTER — INITIAL PRENATAL (OUTPATIENT)
Dept: OBSTETRICS AND GYNECOLOGY | Facility: CLINIC | Age: 32
End: 2022-11-17
Payer: COMMERCIAL

## 2022-11-17 VITALS — BODY MASS INDEX: 26.7 KG/M2 | DIASTOLIC BLOOD PRESSURE: 72 MMHG | WEIGHT: 175.63 LBS | SYSTOLIC BLOOD PRESSURE: 116 MMHG

## 2022-11-17 DIAGNOSIS — Z32.01 POSITIVE PREGNANCY TEST: ICD-10-CM

## 2022-11-17 DIAGNOSIS — Z3A.01 LESS THAN 8 WEEKS GESTATION OF PREGNANCY: Primary | ICD-10-CM

## 2022-11-17 PROCEDURE — 0500F PR INITIAL PRENATAL CARE VISIT: ICD-10-PCS | Mod: CPTII,S$GLB,,

## 2022-11-17 PROCEDURE — 0500F INITIAL PRENATAL CARE VISIT: CPT | Mod: CPTII,S$GLB,,

## 2022-11-17 PROCEDURE — 76801 OB US < 14 WKS SINGLE FETUS: CPT

## 2022-11-17 PROCEDURE — 99999 PR PBB SHADOW E&M-EST. PATIENT-LVL III: CPT | Mod: PBBFAC,,,

## 2022-11-17 PROCEDURE — 76801 US OB/GYN PROCEDURE (VIEWPOINT): ICD-10-PCS | Mod: 26,,, | Performed by: OBSTETRICS & GYNECOLOGY

## 2022-11-17 PROCEDURE — 87086 URINE CULTURE/COLONY COUNT: CPT

## 2022-11-17 PROCEDURE — 87591 N.GONORRHOEAE DNA AMP PROB: CPT

## 2022-11-17 PROCEDURE — 99999 PR PBB SHADOW E&M-EST. PATIENT-LVL III: ICD-10-PCS | Mod: PBBFAC,,,

## 2022-11-17 PROCEDURE — 87491 CHLMYD TRACH DNA AMP PROBE: CPT

## 2022-11-17 PROCEDURE — 76801 OB US < 14 WKS SINGLE FETUS: CPT | Mod: 26,,, | Performed by: OBSTETRICS & GYNECOLOGY

## 2022-11-17 NOTE — PROGRESS NOTES
Chief Complaint   Patient presents with    Initial Prenatal Visit       31 y.o.,  at 7w3d by 7 week ultrasound    Patient presents today for initial prenatal visit. Accompanied by Rosas. Reports good support at home.    Complaints today: breast tenderness.  Doing well overall.   TWG: -2 lbs     Last pap: , NILM, neg HPV. Denies hx of abnormals. Due .    Medical/Surgical/Family history: reviewed with patient    Current medications: PNV    ROS  OBSTETRICS:   Contractions No   Bleeding No   Loss of fluid No    GASTRO:   Nausea No   Vomiting No      OB History    Para Term  AB Living   2 1 1     1   SAB IAB Ectopic Multiple Live Births           1      # Outcome Date GA Lbr Edmond/2nd Weight Sex Delivery Anes PTL Lv   2 Current            1 Term 14 40w0d  2.92 kg (6 lb 7 oz) F Vag-Spont None N ISAAC       Allergies and problem list reviewed and updated  Medical and surgical history reviewed    PHYSICAL EXAM  /72   Wt 79.6 kg (175 lb 9.5 oz)   LMP 09/15/2022 (Approximate)   BMI 26.70 kg/m²     GENERAL: No acute distress  HEENT: Normocephalic, atruamatic  NEURO: Alert and oriented x3  PSYCH: Calm, normal mood and affect  PULMONARY: Non-labored respiration; no tachypnea  ABD: Soft, gravid, nontender    ASSESSMENT AND PLAN     Problems (from 21 to present)     No problems associated with this episode.            Initial labs ordered today, patient to go to lab after visit. Dating US today.  Urine culture ordered.  Counseled today on proper weight gain based on the [place] of Medicine's recommendations based on her pre-pregnancy weight. Discussed excessive weight gain allowance, which would risk her out of the ABC (and would plan for delivery on L&D), but not midwifery care. Reviewed potential risks to excessive weight gain.  .BMI (prepregnancy)  -- Discussed IOM recommended weight gain of:   Weight category Pre pre BMI  Recommended TWG   Underweight Less than  18.5 28-40    Normal Weight 18.5-24.9  25-35    Overweight 25-29.9  15-25    Obese   30 and greater  11-20   -- Discussed criteria for delivery at [facility] r/t excessive pre-preg weight or excessive weight gain:   Pre-pregnancy BMI over 40 or excess pregnancy weight gain defined as:   Pre-preg BMI < 18.5; Excess weight gain = > 60 pound   Pre-preg BMI 18.5-24.9;  Excess weight gain = > 53 pounds   Pre-preg BMI 25-29.9;  Excess weight gain = > 38 pounds   Pre-preg BMI > 30;  Excess weight gain = > 30 pounds    Review exercise precautions in pregnancy, along with recommendations. She does exercise, walks regularly. Advised minimum of 150 min of moderate activity each week.  Discussed importance of nutrition in pregnancy.   Discussed substances & foods to avoid in pregnancy (i.e. sushi, fish that are high in mercury, deli meat, and unpasteurized cheeses).   Discussed prenatal vitamin options (i.e. stool softener, DHA).    Patient was oriented to practice and progression of routine prenatal care. Patient has not completed a Meet & Greet tour of [facility].  Reviewed New OB Pregnancy packet & questions answered.    Reviewed aneuploidy screening options and indications, questions answered - patient would like [id number], will check insurance coverage.  Education regarding warning signs.      Follow up: 4 wks, call or present sooner prn.     Luba Galvez CNM

## 2022-11-18 LAB — BACTERIA UR CULT: NO GROWTH

## 2022-11-21 LAB
C TRACH DNA SPEC QL NAA+PROBE: NOT DETECTED
N GONORRHOEA DNA SPEC QL NAA+PROBE: NOT DETECTED

## 2022-11-29 ENCOUNTER — PATIENT MESSAGE (OUTPATIENT)
Dept: OBSTETRICS AND GYNECOLOGY | Facility: CLINIC | Age: 32
End: 2022-11-29
Payer: COMMERCIAL

## 2022-12-19 ENCOUNTER — PATIENT MESSAGE (OUTPATIENT)
Dept: ADMINISTRATIVE | Facility: OTHER | Age: 32
End: 2022-12-19
Payer: COMMERCIAL

## 2022-12-22 ENCOUNTER — ROUTINE PRENATAL (OUTPATIENT)
Dept: OBSTETRICS AND GYNECOLOGY | Facility: CLINIC | Age: 32
End: 2022-12-22
Payer: COMMERCIAL

## 2022-12-22 ENCOUNTER — PATIENT MESSAGE (OUTPATIENT)
Dept: ADMINISTRATIVE | Facility: OTHER | Age: 32
End: 2022-12-22
Payer: COMMERCIAL

## 2022-12-22 VITALS
BODY MASS INDEX: 27.29 KG/M2 | DIASTOLIC BLOOD PRESSURE: 70 MMHG | SYSTOLIC BLOOD PRESSURE: 118 MMHG | WEIGHT: 179.44 LBS

## 2022-12-22 DIAGNOSIS — Z3A.12 12 WEEKS GESTATION OF PREGNANCY: Primary | ICD-10-CM

## 2022-12-22 DIAGNOSIS — R05.3 COUGH, PERSISTENT: ICD-10-CM

## 2022-12-22 PROCEDURE — 0502F PR SUBSEQUENT PRENATAL CARE: ICD-10-PCS | Mod: CPTII,S$GLB,,

## 2022-12-22 PROCEDURE — 0502F SUBSEQUENT PRENATAL CARE: CPT | Mod: CPTII,S$GLB,,

## 2022-12-22 PROCEDURE — 99999 PR PBB SHADOW E&M-EST. PATIENT-LVL III: ICD-10-PCS | Mod: PBBFAC,,,

## 2022-12-22 PROCEDURE — 99999 PR PBB SHADOW E&M-EST. PATIENT-LVL III: CPT | Mod: PBBFAC,,,

## 2022-12-22 NOTE — PROGRESS NOTES
Chief Complaint   Patient presents with    Routine Prenatal Visit       32 y.o.,  at 12w3d by first trimester US  Patient presents today for initial prenatal visit.    Complaints today: Still experiencing a cough, URI symptoms started . Doing well overall.   TW lbs     BMI 27  -- Discussed IOM recommended weight gain of:   Underweight Less than 18.5 28-40    Normal Weight 18.5-24.9  25-35    Overweight 25-29.9  15-25    Obese   30 and greater  11-20   -- Discussed criteria for delivery at [facility] r/t excessive pre-preg weight or excessive weight gain:   Pre-pregnancy BMI over 40 or excess pregnancy weight gain defined as:   Pre-preg BMI < 18.5; Excess weight gain = > 60 pound   Pre-preg BMI 18.5-24.9;  Excess weight gain = > 53 pounds   Pre-preg BMI 25-29.9;  Excess weight gain = > 38 pounds   Pre-preg BMI > 30;  Excess weight gain = > 30 pounds      ROS  OBSTETRICS:   Contractions No   Bleeding No   Loss of fluid No    GASTRO:   Nausea No   Vomiting No      OB History    Para Term  AB Living   2 1 1     1   SAB IAB Ectopic Multiple Live Births           1      # Outcome Date GA Lbr Edmond/2nd Weight Sex Delivery Anes PTL Lv   2 Current            1 Term 14 40w0d  2.92 kg (6 lb 7 oz) F Vag-Spont None N ISAAC       Allergies and problem list reviewed and updated  Medical and surgical history reviewed    PHYSICAL EXAM  /70   Wt 81.4 kg (179 lb 7.3 oz)   LMP 09/15/2022 (Approximate)   BMI 27.29 kg/m²     GENERAL: No acute distress  HEENT: Normocephalic, atruamatic  NEURO: Alert and oriented x3  PSYCH: Calm, normal mood and affect  PULMONARY: Non-labored respiration; no tachypnea  ABD: Soft, gravid, nontender    ASSESSMENT AND PLAN     Problems (from 21 to present)       No problems associated with this episode.          Referral placed to pulmonology for cough that has not resolved   Patient was oriented to practice and progression of routine prenatal care. Patient has  completed a Meet & Greet tour of [facility].  Reviewed New OB Pregnancy packet & questions answered.    Initial labs and dating u/s reviewed.       Review exercise precautions in pregnancy, along with recommendations. Patient does not exercise regularly.   Discussed diet, along with substances & foods to avoid in pregnancy (i.e. sushi, fish that are high in mercury, deli meat, and unpasteurized cheeses).   Discussed prenatal vitamin options (i.e. stool softener, DHA).      Reviewed aneuploidy screening options and indications, questions answered - patient is still considering -- thinking about having quad screen drawn at next visit.  Education regarding warning signs.      Follow up: 4 wks, call or present sooner prn.     Luba Galvez CNM

## 2022-12-28 ENCOUNTER — HOSPITAL ENCOUNTER (OUTPATIENT)
Dept: RADIOLOGY | Facility: HOSPITAL | Age: 32
Discharge: HOME | End: 2022-12-28
Attending: NURSE PRACTITIONER
Payer: COMMERCIAL

## 2022-12-28 ENCOUNTER — OFFICE VISIT (OUTPATIENT)
Dept: PULMONOLOGY | Facility: CLINIC | Age: 32
End: 2022-12-28
Payer: COMMERCIAL

## 2022-12-28 VITALS
SYSTOLIC BLOOD PRESSURE: 120 MMHG | HEIGHT: 68 IN | BODY MASS INDEX: 27.01 KG/M2 | DIASTOLIC BLOOD PRESSURE: 76 MMHG | HEART RATE: 87 BPM | WEIGHT: 178.25 LBS | OXYGEN SATURATION: 100 %

## 2022-12-28 DIAGNOSIS — R05.3 COUGH, PERSISTENT: ICD-10-CM

## 2022-12-28 DIAGNOSIS — J45.20 MILD INTERMITTENT ASTHMA WITHOUT COMPLICATION: ICD-10-CM

## 2022-12-28 DIAGNOSIS — Z3A.13 13 WEEKS GESTATION OF PREGNANCY: ICD-10-CM

## 2022-12-28 DIAGNOSIS — R09.82 ALLERGIC RHINITIS WITH POSTNASAL DRIP: Primary | ICD-10-CM

## 2022-12-28 DIAGNOSIS — J30.9 ALLERGIC RHINITIS WITH POSTNASAL DRIP: Primary | ICD-10-CM

## 2022-12-28 PROCEDURE — 1159F PR MEDICATION LIST DOCUMENTED IN MEDICAL RECORD: ICD-10-PCS | Mod: CPTII,S$GLB,, | Performed by: NURSE PRACTITIONER

## 2022-12-28 PROCEDURE — 3008F BODY MASS INDEX DOCD: CPT | Mod: CPTII,S$GLB,, | Performed by: NURSE PRACTITIONER

## 2022-12-28 PROCEDURE — 3008F PR BODY MASS INDEX (BMI) DOCUMENTED: ICD-10-PCS | Mod: CPTII,S$GLB,, | Performed by: NURSE PRACTITIONER

## 2022-12-28 PROCEDURE — 99204 OFFICE O/P NEW MOD 45 MIN: CPT | Mod: S$GLB,,, | Performed by: NURSE PRACTITIONER

## 2022-12-28 PROCEDURE — 99999 PR PBB SHADOW E&M-EST. PATIENT-LVL IV: CPT | Mod: PBBFAC,,, | Performed by: NURSE PRACTITIONER

## 2022-12-28 PROCEDURE — 1159F MED LIST DOCD IN RCRD: CPT | Mod: CPTII,S$GLB,, | Performed by: NURSE PRACTITIONER

## 2022-12-28 PROCEDURE — 99204 PR OFFICE/OUTPT VISIT, NEW, LEVL IV, 45-59 MIN: ICD-10-PCS | Mod: S$GLB,,, | Performed by: NURSE PRACTITIONER

## 2022-12-28 PROCEDURE — 3078F PR MOST RECENT DIASTOLIC BLOOD PRESSURE < 80 MM HG: ICD-10-PCS | Mod: CPTII,S$GLB,, | Performed by: NURSE PRACTITIONER

## 2022-12-28 PROCEDURE — 71046 X-RAY EXAM CHEST 2 VIEWS: CPT | Mod: TC,FY

## 2022-12-28 PROCEDURE — 71046 X-RAY EXAM CHEST 2 VIEWS: CPT | Mod: 26,,, | Performed by: RADIOLOGY

## 2022-12-28 PROCEDURE — 71046 XR CHEST PA AND LATERAL: ICD-10-PCS | Mod: 26,,, | Performed by: RADIOLOGY

## 2022-12-28 PROCEDURE — 3074F PR MOST RECENT SYSTOLIC BLOOD PRESSURE < 130 MM HG: ICD-10-PCS | Mod: CPTII,S$GLB,, | Performed by: NURSE PRACTITIONER

## 2022-12-28 PROCEDURE — 3078F DIAST BP <80 MM HG: CPT | Mod: CPTII,S$GLB,, | Performed by: NURSE PRACTITIONER

## 2022-12-28 PROCEDURE — 3074F SYST BP LT 130 MM HG: CPT | Mod: CPTII,S$GLB,, | Performed by: NURSE PRACTITIONER

## 2022-12-28 PROCEDURE — 99999 PR PBB SHADOW E&M-EST. PATIENT-LVL IV: ICD-10-PCS | Mod: PBBFAC,,, | Performed by: NURSE PRACTITIONER

## 2022-12-28 RX ORDER — BUDESONIDE AND FORMOTEROL FUMARATE DIHYDRATE 160; 4.5 UG/1; UG/1
2 AEROSOL RESPIRATORY (INHALATION) EVERY 12 HOURS
Qty: 10.2 G | Refills: 0 | Status: SHIPPED | OUTPATIENT
Start: 2022-12-28 | End: 2023-02-13 | Stop reason: SDUPTHER

## 2022-12-28 NOTE — PROGRESS NOTES
12/28/2022    Keara Wood  New Patient Consult    Chief Complaint   Patient presents with    Shortness of Breath     Only when pt is coughing.     Cough     Pt states that she coughs up brown/yellow mucus. Has been coughing since Thanksgiving.     Fatigue     Pt states its everyday.        HPI:  12/28/2022: Currently pregnant - 13 weeks gestation. Denies history of lung disease, inhaler use, supplemental oxygen use, CPAP use. Denies personal hx of cancer, PE.  Cough: Persistent since Thanksgiving - States at that time developed viral symptoms however tested negative for COVID, Flu, Strep. Completed regiment of abx at that time however no improvement in cough. Productive with yellow/brown mucous in the morning that clears throughout the day. Cough is worse at night time. Associated with nocturnal arousals, difficulty falling asleep. Denies chest tightness. Does endorse occasional wheezing and associated SOB. Has taken Promethazine cough syrup with no reported benefit.  Diagnosed with COVID in 2020 - did not require hospitalization at that time.      Social Hx: Lives with family - one cat in the house. Works [place] . No Asbestosis exposure, Smoking Hx: Never smoker  Family Hx: No Lung Cancer, No COPD, No Asthma  Medical Hx: No previous pneumonia ; No previous shoulder/chest surgery          The chief compliant  problem is new to me.   PFSH:  Past Medical History:   Diagnosis Date    Allergic rhinitis with postnasal drip 05/27/2019    Anxiety     Chronic cough 04/12/2019    Eczema 04/12/2019         Past Surgical History:   Procedure Laterality Date    CARPAL TUNNEL RELEASE Left 02/02/2022    Procedure: RELEASE, CARPAL TUNNEL;  Surgeon: Bipin Newman II, MD;  Location: [facility];  Service: Orthopedics;  Laterality: Left;    CHOLECYSTECTOMY      gallblader removal      around age 19    LIPOSUCTION OF ABDOMEN      2017     Social History     Tobacco Use    Smoking status: Never     Passive exposure:  Never    Smokeless tobacco: Never   Substance Use Topics    Alcohol use: Not Currently     Alcohol/week: 1.0 standard drink     Types: 1 Glasses of wine per week     Comment: 1-2x/week    Drug use: No     Family History   Problem Relation Age of Onset    Hypertension Mother     Diabetes Father     Heart disease Father     Prostate cancer Father     Cancer Father         prostate    Ovarian cancer Maternal Grandmother         mid- to late-60s    Cancer Maternal Grandfather         Throat cancer - smoker    Glaucoma Maternal Grandfather     Heart disease Paternal Grandmother     Heart disease Paternal Grandfather     Colon cancer Paternal Aunt     Macular degeneration Neg Hx     Thyroid disease Neg Hx     Breast cancer Neg Hx      Review of patient's allergies indicates:   Allergen Reactions    Penicillins Hives    Voltaren [diclofenac sodium]      Broke out in rash     I have reviewed past medical, family, and social history. I have reviewed previous nurse notes.    Performance Status:The patient's activity level is functions out of house.        Review of Systems   Constitutional:  Positive for fatigue. Negative for activity change, appetite change, chills, diaphoresis, fever and unexpected weight change.   HENT:  Positive for congestion and postnasal drip. Negative for sinus pressure, sinus pain, sore throat and trouble swallowing.    Eyes:  Negative for photophobia and visual disturbance.   Respiratory:  Positive for cough and wheezing. Negative for choking, chest tightness and shortness of breath.    Cardiovascular:  Negative for chest pain, palpitations and leg swelling.   Gastrointestinal:  Negative for abdominal distention, abdominal pain, blood in stool, constipation, diarrhea, nausea and vomiting.   Genitourinary:  Negative for difficulty urinating, dysuria, flank pain and hematuria.   Musculoskeletal:  Positive for neck pain. Negative for back pain, gait problem and joint swelling.   Skin:  Negative for  "rash and wound.   Allergic/Immunologic: Negative for environmental allergies and food allergies.   Neurological:  Negative for dizziness, seizures, syncope, weakness, light-headedness, numbness and headaches.   Hematological:  Does not bruise/bleed easily.   Psychiatric/Behavioral:  Negative for confusion and sleep disturbance. The patient is not nervous/anxious.        Exam:Comprehensive exam done. /76 (BP Location: Left arm, Patient Position: Sitting, BP Method: Medium (Automatic))   Pulse 87   Ht 5' 8" (1.727 m)   Wt 80.8 kg (178 lb 3.9 oz)   LMP 09/15/2022 (Approximate)   SpO2 100% Comment: room air at rest  BMI 27.10 kg/m²   Exam included Vitals as listed  Constitutional: She is oriented to person, place, and time. She appears well-developed. No distress.   Nose: Nose normal.   Mouth/Throat: Uvula is midline, oropharynx is clear and moist and mucous membranes are normal. No dental caries. No oropharyngeal exudate, posterior oropharyngeal edema, posterior oropharyngeal erythema or tonsillar abscesses.    Eyes: Pupils are equal, round, and reactive to light.   Neck: No JVD present. No thyromegaly present.   Cardiovascular: Normal rate, regular rhythm and normal heart sounds. Exam reveals no gallop and no friction rub.   No murmur heard.  Pulmonary/Chest: Effort normal and breath sounds normal. No accessory muscle usage or stridor. No apnea and no tachypnea. No respiratory distress, decreased breath sounds, wheezes, rhonchi, rales, or tenderness. Clear breath sounds throughout, on room air, in no acute distress.  Abdominal: Soft. She exhibits no mass. There is no tenderness. No hepatosplenomegaly, hernias and normoactive bowel sounds  Musculoskeletal: Normal range of motion. exhibits no edema.   Neurological:  alert and oriented to person, place, and time. not disoriented.   Skin: Skin is warm and dry. Capillary refill takes less 2 sec. No cyanosis or erythema. No pallor. Nails show no clubbing. "   Psychiatric: normal mood and affect. behavior is normal. Judgment and thought content normal.       Radiographs (ct chest and cxr) reviewed: view by direct vision     Chest Xray 2019:  Lung clear      Labs reviewed    Lab Results   Component Value Date    WBC 8.41 11/17/2022    RBC 4.30 11/17/2022    HGB 12.9 11/17/2022    HCT 38.2 11/17/2022    MCV 89 11/17/2022    MCH 30.0 11/17/2022    MCHC 33.8 11/17/2022    RDW 12.0 11/17/2022     11/17/2022    MPV 10.2 11/17/2022    GRAN 5.8 11/17/2022    GRAN 68.9 11/17/2022    LYMPH 1.7 11/17/2022    LYMPH 19.7 11/17/2022    MONO 0.8 11/17/2022    MONO 9.0 11/17/2022    EOS 0.1 11/17/2022    BASO 0.04 11/17/2022    EOSINOPHIL 1.5 11/17/2022    BASOPHIL 0.5 11/17/2022       PFT was not done  Pulmonary Functions Testing Results:        Plan:  Clinical impression is resonably certain and repeated evaluation prn +/- follow up will be needed as below.    Keara was seen today for shortness of breath, cough and fatigue.    Diagnoses and all orders for this visit:    Allergic rhinitis with postnasal drip    Cough, persistent  -     Ambulatory referral/consult to Pulmonology  -     Culture, Respiratory with Gram Stain; Standing    13 weeks gestation of pregnancy    Mild intermittent asthma without complication  -     X-Ray Chest PA And Lateral; Future  -     budesonide-formoterol 160-4.5 mcg (SYMBICORT) 160-4.5 mcg/actuation HFAA; Inhale 2 puffs into the lungs every 12 (twelve) hours. Controller  -     Culture, Respiratory with Gram Stain; Standing        Follow up in about 6 weeks (around 2/8/2023), or if symptoms worsen or fail to improve.    Discussed with patient above for education the following:      Patient Instructions   Etiology of cough is unknown at this time.    Can trial daily inhaler therapy - Symbicort - this is to be taken two puffs twice per day.  This inhaler contains an inhaled steroid component. Rinse mouth after each use due to risk for thrush  development. If mouth or tongue develops white sores please contact the clinic and I will order a prescription mouth wash.     Check Chest Xray now.    I have ordered sputum cultures - you will leave the office today with sputum specimen cups. Bring to any [facility] within 4 hours of obtaining specimen. Rinse your mouth prior to collecting sample. Please collect sample in the morning by deep coughing prior to eating or drinking.     Continue current medication regiment. Keep follow up appointment as scheduled. Please call the office if you have any questions or concerns.

## 2022-12-28 NOTE — PATIENT INSTRUCTIONS
Etiology of cough is unknown at this time.    Can trial daily inhaler therapy - Symbicort - this is to be taken two puffs twice per day.  This inhaler contains an inhaled steroid component. Rinse mouth after each use due to risk for thrush development. If mouth or tongue develops white sores please contact the clinic and I will order a prescription mouth wash.     Check Chest Xray now.    I have ordered sputum cultures - you will leave the office today with sputum specimen cups. Bring to any [facility] within 4 hours of obtaining specimen. Rinse your mouth prior to collecting sample. Please collect sample in the morning by deep coughing prior to eating or drinking.     Continue current medication regiment. Keep follow up appointment as scheduled. Please call the office if you have any questions or concerns.

## 2023-01-01 NOTE — PROCEDURES
Procedures     [facility]  Physical Medicine and Rehabilitation   [address] [place] [address], [address]  [place] [address]             Patient: Keara Higgins   Patient ID: [id number]   Sex:     Date of Birth:     Age:     Notes:     Last visit date: 2/28/2020         Visit date and time: 2/28/2020 07:22   Patient Age on Visit Date:     Referring Physician:     Diagnoses:         Hand numbness    Sensory NCS      Nerve / Sites Rec. Site Onset Lat Peak Lat Ref. NP Amp Ref. PP Amp Ref. Segments Distance Velocity     ms ms ms µV µV µV µV  cm m/s   R Median - Digit II (Antidromic)      Wrist Dig II 2.81 3.49 ?3.40 59.5 ?15.0 42.7 ?20.0 Wrist - Dig II 13 46   L Median - Digit II (Antidromic)      Wrist Dig II 2.55 3.28 ?3.40 29.5 ?15.0 55.9 ?20.0 Wrist - Dig II 13 51   R Ulnar - Digit V (Antidromic)      Wrist Dig V 2.14 2.76 ?3.10 36.0 ?10.0 71.1 ?15.0 Wrist - Dig V 11 52   L Ulnar - Digit V (Antidromic)      Wrist Dig V 2.34 2.92 ?3.10 30.5 ?10.0 60.9 ?15.0 Wrist - Dig V 11 47       Motor NCS      Nerve / Sites Muscle Latency Ref. Amplitude Ref. Amp % Duration Segments Distance Lat Diff Velocity Ref.     ms ms mV mV % ms  cm ms m/s m/s   L Median - APB      Wrist APB 3.18 ?4.40 11.8 ?4.0 100 7.03 Wrist - APB 7         Elbow APB 7.40  10.7  90.2 7.50 Elbow - Wrist 24 4.22 57 ?49   R Median - APB      Wrist APB 3.18 ?4.40 12.0 ?4.0 100 6.51 Wrist - APB 7         Elbow APB 7.55  11.8  98.9 6.88 Elbow - Wrist 25.5 4.38 58 ?49   L Ulnar - ADM      Wrist ADM 2.34 ?3.60 9.8 ?5.0 100 6.72 Wrist - ADM 7         B.Elbow ADM 6.41  9.7  99 6.82 B.Elbow - Wrist 24.5 4.06 60 ?49      A.Elbow ADM 8.13  10.0  102 6.77 A.Elbow - B.Elbow 10 1.72 58 ?49   R Ulnar - ADM      Wrist ADM 2.50 ?3.60 9.6 ?5.0 100 6.04 Wrist - ADM 7         B.Elbow ADM 6.41  9.9  104 6.41 B.Elbow - Wrist 24.5 3.91 63 ?49      A.Elbow ADM 8.28  10.0  104 6.35 A.Elbow - B.Elbow 10 1.88 53 ?49       EMG Summary Table     Spontaneous MUAP Recruitment    Muscle IA Fib PSW Fasc H.F. Amp Dur. PPP Pattern   L. Biceps brachii N None None None None N N N N   R. Biceps brachii N None None None None N N N N   L. Deltoid N None None None None N N N N   R. Deltoid N None None None None N N N N   L. Triceps brachii N None None None None N N N N   R. Triceps brachii N None None None None N N N N   L. Extensor carpi radialis brevis N None None None None N N N N   R. Extensor carpi radialis brevis N None None None None N N N N   L. First dorsal interosseous N None None None None N N N N   R. First dorsal interosseous N None None None None N N N N       Summary    The motor conduction test was normal in all 4 of the tested nerves: L Median - APB, R Median - APB, L Ulnar - ADM, R Ulnar - ADM.    The sensory conduction test was performed on 4 nerve(s). The results were normal in 3 nerve(s): L Median - Digit II (Antidromic), R Ulnar - Digit V (Antidromic), L Ulnar - Digit V (Antidromic). Results outside the specified normal range were found in 1 nerve(s), as follows:   In the R Median - Digit II (Antidromic) study  o the peak latency result was increased for Wrist stimulation    The needle EMG study was normal in all 10 tested muscles: L. Biceps brachii, R. Biceps brachii, L. Deltoid, R. Deltoid, L. Triceps brachii, R. Triceps brachii, L. Extensor carpi radialis brevis, R. Extensor carpi radialis brevis, L. First dorsal interosseous, R. First dorsal interosseous.          Conclusion:     Mild right carpal tunnel syndrome          ____________________________  Lauren Haines D.O.                                    Check here if all serologies below were negative, non-reactive or immune. Otherwise select appropriate status.

## 2023-01-03 ENCOUNTER — PATIENT MESSAGE (OUTPATIENT)
Dept: OBSTETRICS AND GYNECOLOGY | Facility: CLINIC | Age: 33
End: 2023-01-03
Payer: COMMERCIAL

## 2023-01-04 ENCOUNTER — PATIENT MESSAGE (OUTPATIENT)
Dept: OBSTETRICS AND GYNECOLOGY | Facility: CLINIC | Age: 33
End: 2023-01-04
Payer: COMMERCIAL

## 2023-01-05 ENCOUNTER — PATIENT MESSAGE (OUTPATIENT)
Dept: OBSTETRICS AND GYNECOLOGY | Facility: CLINIC | Age: 33
End: 2023-01-05
Payer: COMMERCIAL

## 2023-01-16 ENCOUNTER — PATIENT MESSAGE (OUTPATIENT)
Dept: OTHER | Facility: OTHER | Age: 33
End: 2023-01-16
Payer: COMMERCIAL

## 2023-01-23 ENCOUNTER — LAB VISIT (OUTPATIENT)
Dept: LAB | Facility: OTHER | Age: 33
End: 2023-01-23
Payer: COMMERCIAL

## 2023-01-23 ENCOUNTER — PATIENT MESSAGE (OUTPATIENT)
Dept: OTHER | Facility: OTHER | Age: 33
End: 2023-01-23
Payer: COMMERCIAL

## 2023-01-23 ENCOUNTER — ROUTINE PRENATAL (OUTPATIENT)
Dept: OBSTETRICS AND GYNECOLOGY | Facility: CLINIC | Age: 33
End: 2023-01-23
Payer: COMMERCIAL

## 2023-01-23 VITALS
WEIGHT: 182.13 LBS | BODY MASS INDEX: 27.69 KG/M2 | SYSTOLIC BLOOD PRESSURE: 116 MMHG | DIASTOLIC BLOOD PRESSURE: 70 MMHG

## 2023-01-23 DIAGNOSIS — Z13.79 GENETIC SCREENING: ICD-10-CM

## 2023-01-23 DIAGNOSIS — Z3A.17 17 WEEKS GESTATION OF PREGNANCY: Primary | ICD-10-CM

## 2023-01-23 PROCEDURE — 0502F PR SUBSEQUENT PRENATAL CARE: ICD-10-PCS | Mod: CPTII,S$GLB,, | Performed by: MIDWIFE

## 2023-01-23 PROCEDURE — 0502F SUBSEQUENT PRENATAL CARE: CPT | Mod: CPTII,S$GLB,, | Performed by: MIDWIFE

## 2023-01-23 PROCEDURE — 81511 FTL CGEN ABNOR FOUR ANAL: CPT

## 2023-01-23 PROCEDURE — 99999 PR PBB SHADOW E&M-EST. PATIENT-LVL II: ICD-10-PCS | Mod: PBBFAC,,, | Performed by: MIDWIFE

## 2023-01-23 PROCEDURE — 99999 PR PBB SHADOW E&M-EST. PATIENT-LVL II: CPT | Mod: PBBFAC,,, | Performed by: MIDWIFE

## 2023-01-23 PROCEDURE — 36415 COLL VENOUS BLD VENIPUNCTURE: CPT

## 2023-01-23 NOTE — PROGRESS NOTES
32 y.o.,  at 17w0d here for Routine OB appointment.     Complaints today: Doing well today. Reports back and pelvic pain throughout day and night. Reports feeling flutters/fetal movement.  TW lbs     ROS  OBSTETRICS:   Contractions No   Bleeding No   Loss of fluid No    GASTRO:   Nausea No   Vomiting No      OB History    Para Term  AB Living   2 1 1     1   SAB IAB Ectopic Multiple Live Births           1      # Outcome Date GA Lbr Edmond/2nd Weight Sex Delivery Anes PTL Lv   2 Current            1 Term 14 40w0d  2.92 kg (6 lb 7 oz) F Vag-Spont None N ISAAC       Dating reviewed  Allergies and problem list reviewed and updated  Medical and surgical history reviewed  Prenatal labs reviewed and updated    PHYSICAL EXAM  LMP 09/15/2022 (Approximate)     GENERAL: No acute distress  HEENT: Normocephalic, atruamatic  NEURO: Alert and oriented x3  PSYCH: Calm, normal mood and affect  PULMONARY: Non-labored respiration; no tachypnea  ABD: Soft, gravid, nontender  FH = 16cm between PS & umbilicus    ASSESSMENT AND PLAN     Problems (from 22 to present)       No problems associated with this episode.                Pt desires aneuploidy screening and further discussed today. Quad screen ordered/will have drawn today.  Discussed stretches for relieving back pain. Instructed to follow-up with us if back pain is not resolved. Discussed PT if needed/desired.     Anatomy ultrasound with MFM ordered/discussed.    Reviewed warning signs and pregnancy precautions, along with how/when to call.  Follow up: 4 wks, call or present sooner prn.     Pt VU and agrees with POC    SN Jairon/ALAN Cho CNM

## 2023-01-30 LAB
# FETUSES US: NORMAL
2ND TRIMESTER 4 SCREEN PNL SERPL: NEGATIVE
2ND TRIMESTER 4 SCREEN SERPL-IMP: NORMAL
AFP MOM SERPL: 0.94
AFP SERPL-MCNC: 37.7 NG/ML
AGE AT DELIVERY: 33
B-HCG MOM SERPL: 0.77
B-HCG SERPL-ACNC: 25.1 IU/ML
FET TS 21 RISK FROM MAT AGE: NORMAL
GA (DAYS): 0 D
GA (WEEKS): 17 WK
GA METHOD: NORMAL
IDDM PATIENT QL: NORMAL
INHIBIN A MOM SERPL: 0.95
INHIBIN A SERPL-MCNC: 123 PG/ML
SMOKING STATUS FTND: NO
TS 18 RISK FETUS: NORMAL
TS 21 RISK FETUS: NORMAL
U ESTRIOL MOM SERPL: 1.07
U ESTRIOL SERPL-MCNC: 1.3 NG/ML

## 2023-02-08 ENCOUNTER — PATIENT MESSAGE (OUTPATIENT)
Dept: MATERNAL FETAL MEDICINE | Facility: CLINIC | Age: 33
End: 2023-02-08
Payer: COMMERCIAL

## 2023-02-10 ENCOUNTER — PROCEDURE VISIT (OUTPATIENT)
Dept: MATERNAL FETAL MEDICINE | Facility: CLINIC | Age: 33
End: 2023-02-10
Payer: COMMERCIAL

## 2023-02-10 DIAGNOSIS — Z36.2 ENCOUNTER FOR FOLLOW-UP ULTRASOUND OF FETAL ANATOMY: Primary | ICD-10-CM

## 2023-02-10 DIAGNOSIS — Z3A.01 LESS THAN 8 WEEKS GESTATION OF PREGNANCY: ICD-10-CM

## 2023-02-10 PROCEDURE — 76805 US MFM PROCEDURE (VIEWPOINT): ICD-10-PCS | Mod: S$GLB,,, | Performed by: OBSTETRICS & GYNECOLOGY

## 2023-02-10 PROCEDURE — 76805 OB US >/= 14 WKS SNGL FETUS: CPT | Mod: S$GLB,,, | Performed by: OBSTETRICS & GYNECOLOGY

## 2023-02-13 ENCOUNTER — PATIENT MESSAGE (OUTPATIENT)
Dept: OTHER | Facility: OTHER | Age: 33
End: 2023-02-13
Payer: COMMERCIAL

## 2023-02-13 ENCOUNTER — PATIENT MESSAGE (OUTPATIENT)
Dept: PULMONOLOGY | Facility: CLINIC | Age: 33
End: 2023-02-13
Payer: COMMERCIAL

## 2023-02-13 DIAGNOSIS — J45.20 MILD INTERMITTENT ASTHMA WITHOUT COMPLICATION: ICD-10-CM

## 2023-02-13 RX ORDER — BUDESONIDE AND FORMOTEROL FUMARATE DIHYDRATE 160; 4.5 UG/1; UG/1
2 AEROSOL RESPIRATORY (INHALATION) EVERY 12 HOURS
Qty: 10.2 G | Refills: 5 | Status: SHIPPED | OUTPATIENT
Start: 2023-02-13 | End: 2023-04-26

## 2023-02-13 RX ORDER — BUDESONIDE AND FORMOTEROL FUMARATE DIHYDRATE 160; 4.5 UG/1; UG/1
2 AEROSOL RESPIRATORY (INHALATION) EVERY 12 HOURS
Qty: 10.2 G | Refills: 0 | Status: SHIPPED | OUTPATIENT
Start: 2023-02-13 | End: 2023-04-26

## 2023-02-16 ENCOUNTER — PATIENT MESSAGE (OUTPATIENT)
Dept: OBSTETRICS AND GYNECOLOGY | Facility: CLINIC | Age: 33
End: 2023-02-16
Payer: COMMERCIAL

## 2023-03-07 ENCOUNTER — PATIENT MESSAGE (OUTPATIENT)
Dept: MATERNAL FETAL MEDICINE | Facility: CLINIC | Age: 33
End: 2023-03-07
Payer: COMMERCIAL

## 2023-03-08 ENCOUNTER — PATIENT MESSAGE (OUTPATIENT)
Dept: OBSTETRICS AND GYNECOLOGY | Facility: CLINIC | Age: 33
End: 2023-03-08

## 2023-03-08 ENCOUNTER — PROCEDURE VISIT (OUTPATIENT)
Dept: MATERNAL FETAL MEDICINE | Facility: CLINIC | Age: 33
End: 2023-03-08
Payer: COMMERCIAL

## 2023-03-08 ENCOUNTER — ROUTINE PRENATAL (OUTPATIENT)
Dept: OBSTETRICS AND GYNECOLOGY | Facility: CLINIC | Age: 33
End: 2023-03-08
Payer: COMMERCIAL

## 2023-03-08 VITALS
BODY MASS INDEX: 28.09 KG/M2 | SYSTOLIC BLOOD PRESSURE: 118 MMHG | DIASTOLIC BLOOD PRESSURE: 70 MMHG | WEIGHT: 184.75 LBS

## 2023-03-08 DIAGNOSIS — Z71.85 VACCINE COUNSELING: ICD-10-CM

## 2023-03-08 DIAGNOSIS — Z3A.23 23 WEEKS GESTATION OF PREGNANCY: Primary | ICD-10-CM

## 2023-03-08 DIAGNOSIS — Z36.2 ENCOUNTER FOR FOLLOW-UP ULTRASOUND OF FETAL ANATOMY: ICD-10-CM

## 2023-03-08 DIAGNOSIS — Z36.4 ULTRASOUND FOR ANTENATAL SCREENING FOR FETAL GROWTH RESTRICTION: ICD-10-CM

## 2023-03-08 PROBLEM — Z34.90 PREGNANCY: Status: ACTIVE | Noted: 2023-03-08

## 2023-03-08 PROBLEM — Z32.01 POSITIVE PREGNANCY TEST: Status: RESOLVED | Noted: 2022-11-17 | Resolved: 2023-03-08

## 2023-03-08 PROCEDURE — 76816 PR  US,PREGNANT UTERUS,F/U,TRANSABD APP: ICD-10-PCS | Mod: S$GLB,,, | Performed by: OBSTETRICS & GYNECOLOGY

## 2023-03-08 PROCEDURE — 99999 PR PBB SHADOW E&M-EST. PATIENT-LVL II: ICD-10-PCS | Mod: PBBFAC,,, | Performed by: ADVANCED PRACTICE MIDWIFE

## 2023-03-08 PROCEDURE — 0502F PR SUBSEQUENT PRENATAL CARE: ICD-10-PCS | Mod: CPTII,S$GLB,, | Performed by: ADVANCED PRACTICE MIDWIFE

## 2023-03-08 PROCEDURE — 76816 OB US FOLLOW-UP PER FETUS: CPT | Mod: S$GLB,,, | Performed by: OBSTETRICS & GYNECOLOGY

## 2023-03-08 PROCEDURE — 0502F SUBSEQUENT PRENATAL CARE: CPT | Mod: CPTII,S$GLB,, | Performed by: ADVANCED PRACTICE MIDWIFE

## 2023-03-08 PROCEDURE — 99999 PR PBB SHADOW E&M-EST. PATIENT-LVL II: CPT | Mod: PBBFAC,,, | Performed by: ADVANCED PRACTICE MIDWIFE

## 2023-03-08 NOTE — PROGRESS NOTES
"32 y.o. female  at 23w2d, by Estimated Date of Delivery: 7/3/23. Accompanied by Rosas today.    Complaints today: Questions about travel - won a free two day cruise to [facility] leaving out of [place] when she's approx 29/30w EGA.  Also reports occasional changes/increase in vaginal discharge (nothing currently; just in general has noticed in pregnancy) - denies s/s UTI, denies vulvovaginal itching/irritation, denies leaking of amniotic fluid. Overall doing well today.     Reviewed TW lbs    ROS  OBSTETRICS:   Contractions No   Bleeding No   Loss of fluid No   Fetal mvmnt YES  GASTRO:   Nausea No   Vomiting No      OB History    Para Term  AB Living   2 1 1     1   SAB IAB Ectopic Multiple Live Births           1      # Outcome Date GA Lbr Edmond/2nd Weight Sex Delivery Anes PTL Lv   2 Current            1 Term 14 40w0d  2.92 kg (6 lb 7 oz) F Vag-Spont None N ISAAC       Dating reviewed  Allergies and problem list reviewed and updated  Medical and surgical history reviewed  Prenatal labs reviewed and updated    PHYSICAL EXAM  /70   Wt 83.8 kg (184 lb 11.9 oz)   LMP 09/15/2022 (Approximate)   BMI 28.09 kg/m²     GENERAL: No acute distress  HEENT: Normocephalic, atraumatic  NEURO: Alert and oriented x3  PSYCH: Normal mood and affect  PULMONARY: Non-labored respiration; no tachypnea  ABD: Soft, gravid, nontender      ASSESSMENT AND PLAN     Problems (from 22 to present)     No problems associated with this episode.        Had repeat ultrasound with MFM today - verbal report from her "everything was good" - will review official report when available (not populated at time of appt yet).     Discussed travel precautions and overall general recommendation against international travel in 3rd trimester; reviewed risks/benefits to this, and possible limitations per cruise line. She will consider.    Education regarding hormonal/physiologic changes in pregnancy in regards to " discharge, along with warning s/s to notify us regarding.  Connected mom not working - can't synch with her devices. Reviewed options - she will message if unable to get in contact with help line.    Taking an online childbirth education course with a /virtual health  (@[employer] [facility] Given schedule for classes here and discussed.  Walking regularly for exercise - reviewed diet/exercise in pregnancy today and questions answered.    Reviewed upcoming 28wk labs, (A POS) and orders placed  Education regarding warning signs of PreEclampsia, reviewed normal FM,  labor precautions, and how/when to call.    Follow-up: 4 weeks, call or present sooner RAYSHAWN Morrow CNM  OB/GYN

## 2023-03-13 ENCOUNTER — PATIENT MESSAGE (OUTPATIENT)
Dept: OTHER | Facility: OTHER | Age: 33
End: 2023-03-13
Payer: COMMERCIAL

## 2023-03-17 ENCOUNTER — PATIENT MESSAGE (OUTPATIENT)
Dept: OBSTETRICS AND GYNECOLOGY | Facility: CLINIC | Age: 33
End: 2023-03-17
Payer: COMMERCIAL

## 2023-03-27 ENCOUNTER — PATIENT MESSAGE (OUTPATIENT)
Dept: OTHER | Facility: OTHER | Age: 33
End: 2023-03-27
Payer: COMMERCIAL

## 2023-04-04 ENCOUNTER — LAB VISIT (OUTPATIENT)
Dept: LAB | Facility: OTHER | Age: 33
End: 2023-04-04
Attending: ADVANCED PRACTICE MIDWIFE
Payer: COMMERCIAL

## 2023-04-04 ENCOUNTER — ROUTINE PRENATAL (OUTPATIENT)
Dept: OBSTETRICS AND GYNECOLOGY | Facility: CLINIC | Age: 33
End: 2023-04-04
Payer: COMMERCIAL

## 2023-04-04 VITALS
WEIGHT: 187.38 LBS | DIASTOLIC BLOOD PRESSURE: 72 MMHG | BODY MASS INDEX: 28.49 KG/M2 | SYSTOLIC BLOOD PRESSURE: 118 MMHG

## 2023-04-04 DIAGNOSIS — R05.3 CHRONIC COUGH: ICD-10-CM

## 2023-04-04 DIAGNOSIS — Z3A.27 27 WEEKS GESTATION OF PREGNANCY: ICD-10-CM

## 2023-04-04 DIAGNOSIS — Z3A.23 23 WEEKS GESTATION OF PREGNANCY: ICD-10-CM

## 2023-04-04 DIAGNOSIS — R73.09 ABNORMAL GLUCOSE TOLERANCE TEST: Primary | ICD-10-CM

## 2023-04-04 LAB
BASOPHILS # BLD AUTO: 0.02 K/UL (ref 0–0.2)
BASOPHILS NFR BLD: 0.2 % (ref 0–1.9)
DIFFERENTIAL METHOD: ABNORMAL
EOSINOPHIL # BLD AUTO: 0.1 K/UL (ref 0–0.5)
EOSINOPHIL NFR BLD: 1.3 % (ref 0–8)
ERYTHROCYTE [DISTWIDTH] IN BLOOD BY AUTOMATED COUNT: 13.1 % (ref 11.5–14.5)
GLUCOSE SERPL-MCNC: 172 MG/DL (ref 70–140)
HCT VFR BLD AUTO: 35.4 % (ref 37–48.5)
HGB BLD-MCNC: 11.6 G/DL (ref 12–16)
IMM GRANULOCYTES # BLD AUTO: 0.06 K/UL (ref 0–0.04)
IMM GRANULOCYTES NFR BLD AUTO: 0.6 % (ref 0–0.5)
LYMPHOCYTES # BLD AUTO: 1.8 K/UL (ref 1–4.8)
LYMPHOCYTES NFR BLD: 19.5 % (ref 18–48)
MCH RBC QN AUTO: 29.2 PG (ref 27–31)
MCHC RBC AUTO-ENTMCNC: 32.8 G/DL (ref 32–36)
MCV RBC AUTO: 89 FL (ref 82–98)
MONOCYTES # BLD AUTO: 0.7 K/UL (ref 0.3–1)
MONOCYTES NFR BLD: 7.3 % (ref 4–15)
NEUTROPHILS # BLD AUTO: 6.6 K/UL (ref 1.8–7.7)
NEUTROPHILS NFR BLD: 71.1 % (ref 38–73)
NRBC BLD-RTO: 0 /100 WBC
PLATELET # BLD AUTO: 262 K/UL (ref 150–450)
PMV BLD AUTO: 9.8 FL (ref 9.2–12.9)
RBC # BLD AUTO: 3.97 M/UL (ref 4–5.4)
WBC # BLD AUTO: 9.24 K/UL (ref 3.9–12.7)

## 2023-04-04 PROCEDURE — 36415 COLL VENOUS BLD VENIPUNCTURE: CPT | Performed by: ADVANCED PRACTICE MIDWIFE

## 2023-04-04 PROCEDURE — 99999 PR PBB SHADOW E&M-EST. PATIENT-LVL III: CPT | Mod: PBBFAC,,,

## 2023-04-04 PROCEDURE — 99999 PR PBB SHADOW E&M-EST. PATIENT-LVL III: ICD-10-PCS | Mod: PBBFAC,,,

## 2023-04-04 PROCEDURE — 0502F SUBSEQUENT PRENATAL CARE: CPT | Mod: CPTII,S$GLB,,

## 2023-04-04 PROCEDURE — 0502F PR SUBSEQUENT PRENATAL CARE: ICD-10-PCS | Mod: CPTII,S$GLB,,

## 2023-04-04 PROCEDURE — 82950 GLUCOSE TEST: CPT | Performed by: ADVANCED PRACTICE MIDWIFE

## 2023-04-04 PROCEDURE — 85025 COMPLETE CBC W/AUTO DIFF WBC: CPT | Performed by: ADVANCED PRACTICE MIDWIFE

## 2023-04-04 NOTE — PROGRESS NOTES
Chief Complaint   Patient presents with    Routine Prenatal Visit       32 y.o. female  at 27w1d by Estimated Date of Delivery: 7/3/23    Complaints today: None. Occasional BH, 2-3 times per day.   Reviewed TWlbs    ROS  OBSTETRICS:   Contractions No   Bleeding No   Loss of fluid No   Fetal mvmnt Yes  GASTRO:   Nausea No   Vomiting No      OB History    Para Term  AB Living   2 1 1     1   SAB IAB Ectopic Multiple Live Births           1      # Outcome Date GA Lbr Edmond/2nd Weight Sex Delivery Anes PTL Lv   2 Current            1 Term 14 40w0d  2.92 kg (6 lb 7 oz) F Vag-Spont None N ISAAC       Dating reviewed  Allergies and problem list reviewed and updated  Medical and surgical history reviewed  Prenatal labs reviewed and updated    PHYSICAL EXAM  /72   Wt 85 kg (187 lb 6.3 oz)   LMP 09/15/2022 (Approximate)   BMI 28.49 kg/m²     GENERAL: No acute distress  HEENT: Normocephalic, atraumatic  NEURO: Alert and oriented x3  PSYCH: Normal mood and affect  PULMONARY: Non-labored respiration; no tachypnea  ABD: Soft, gravid, nontender.    ASSESSMENT AND PLAN     Problems (from 22 to present)     Problem Noted Resolved    Pregnancy 3/8/2023 by Angeles Morrow CNM No    Overview Addendum 3/8/2023 10:00 AM by Angeles Morrow CNM     Connected Mom: ordered/enrolled (not synching at 23w visit)  Prepregnancy BMI: 27   Pap: 2022 - NILM; HR HPV negative  Dating - per 7w sono  U/S - no anomalies/Incomplete [x]Repeat MFM sono - Normal anatomy  Aneuploidy screening -  Blood type: A POS.   GDM screen -   Vaccines -   [ ]Flu - declines  [ ]Covid-19 -declines  [ ]TDAP - declines  Contraception -   Peds - Peds hospitalist -   Circ - N/A  GBS  [ ]Consents                 Completing 28wk labs today.   Blood type: (A POS).   Education regarding CDC recommendations for Tdap in pregnancy. Patient declines.     Reviewed warning signs, normal FKCs,  labor precautions and how/when  to call.    Follow-up: 3 weeks, call or present sooner PRN.    Pt VU and agrees with POC    Concepcion Martinez CNM

## 2023-04-10 ENCOUNTER — PATIENT MESSAGE (OUTPATIENT)
Dept: OTHER | Facility: OTHER | Age: 33
End: 2023-04-10
Payer: COMMERCIAL

## 2023-04-24 ENCOUNTER — PATIENT MESSAGE (OUTPATIENT)
Dept: OTHER | Facility: OTHER | Age: 33
End: 2023-04-24
Payer: COMMERCIAL

## 2023-04-25 ENCOUNTER — ROUTINE PRENATAL (OUTPATIENT)
Dept: OBSTETRICS AND GYNECOLOGY | Facility: CLINIC | Age: 33
End: 2023-04-25
Payer: COMMERCIAL

## 2023-04-25 ENCOUNTER — LAB VISIT (OUTPATIENT)
Dept: LAB | Facility: OTHER | Age: 33
End: 2023-04-25
Payer: COMMERCIAL

## 2023-04-25 ENCOUNTER — PATIENT MESSAGE (OUTPATIENT)
Dept: OBSTETRICS AND GYNECOLOGY | Facility: CLINIC | Age: 33
End: 2023-04-25

## 2023-04-25 VITALS
BODY MASS INDEX: 28.86 KG/M2 | WEIGHT: 189.81 LBS | SYSTOLIC BLOOD PRESSURE: 118 MMHG | DIASTOLIC BLOOD PRESSURE: 70 MMHG

## 2023-04-25 DIAGNOSIS — Z3A.30 30 WEEKS GESTATION OF PREGNANCY: Primary | ICD-10-CM

## 2023-04-25 DIAGNOSIS — O26.899 PELVIC PAIN IN PREGNANCY: ICD-10-CM

## 2023-04-25 DIAGNOSIS — R10.2 PELVIC PAIN IN PREGNANCY: ICD-10-CM

## 2023-04-25 DIAGNOSIS — R73.09 ABNORMAL GLUCOSE TOLERANCE TEST: ICD-10-CM

## 2023-04-25 DIAGNOSIS — O24.410 DIET CONTROLLED GESTATIONAL DIABETES MELLITUS (GDM) IN THIRD TRIMESTER: ICD-10-CM

## 2023-04-25 PROBLEM — O24.419 GESTATIONAL DIABETES MELLITUS (GDM) IN THIRD TRIMESTER: Status: ACTIVE | Noted: 2023-04-25

## 2023-04-25 LAB
GLUCOSE SERPL-MCNC: 188 MG/DL
GLUCOSE SERPL-MCNC: 192 MG/DL
GLUCOSE SERPL-MCNC: 87 MG/DL (ref 70–110)
GLUCOSE SERPL-MCNC: 96 MG/DL

## 2023-04-25 PROCEDURE — 99999 PR PBB SHADOW E&M-EST. PATIENT-LVL III: ICD-10-PCS | Mod: PBBFAC,,, | Performed by: ADVANCED PRACTICE MIDWIFE

## 2023-04-25 PROCEDURE — 36415 COLL VENOUS BLD VENIPUNCTURE: CPT

## 2023-04-25 PROCEDURE — 0502F PR SUBSEQUENT PRENATAL CARE: ICD-10-PCS | Mod: CPTII,S$GLB,, | Performed by: ADVANCED PRACTICE MIDWIFE

## 2023-04-25 PROCEDURE — 99999 PR PBB SHADOW E&M-EST. PATIENT-LVL III: CPT | Mod: PBBFAC,,, | Performed by: ADVANCED PRACTICE MIDWIFE

## 2023-04-25 PROCEDURE — 0502F SUBSEQUENT PRENATAL CARE: CPT | Mod: CPTII,S$GLB,, | Performed by: ADVANCED PRACTICE MIDWIFE

## 2023-04-25 PROCEDURE — 82951 GLUCOSE TOLERANCE TEST (GTT): CPT

## 2023-04-25 RX ORDER — DEXTROSE 4 G
1 TABLET,CHEWABLE ORAL 4 TIMES DAILY
Qty: 1 EACH | Refills: 0 | Status: ON HOLD | OUTPATIENT
Start: 2023-04-25 | End: 2023-07-05

## 2023-04-25 NOTE — TELEPHONE ENCOUNTER
Attempted to call patient, unable to talk with her.  Left a voicemail for [facility] to [facility] results and treatment instructions.

## 2023-04-25 NOTE — PROGRESS NOTES
32 y.o. female  at 30w1d, by Estimated Date of Delivery: 7/3/23    Complaints today:  Occasional mild, increased pelvic pressure to  region occasionally over the last two+ weeks. Not constant, not experiencing it today. Resolves when changing activity. Reports occasional/rare becky macdonald, denies cramping, denies contractions. Denies LOF. Doing well today. Completing her 3hr GTT today    Reviewed TW lbs    ROS  OBSTETRICS:   Contractions No   Bleeding No   Loss of fluid No   Fetal mvmnt YES  GASTRO:   Nausea No   Vomiting No      OB History    Para Term  AB Living   2 1 1     1   SAB IAB Ectopic Multiple Live Births           1      # Outcome Date GA Lbr Edmond/2nd Weight Sex Delivery Anes PTL Lv   2 Current            1 Term 14 40w0d  2.92 kg (6 lb 7 oz) F Vag-Spont None N ISAAC       Dating reviewed  Allergies and problem list reviewed and updated  Medical and surgical history reviewed  Prenatal labs reviewed and updated    PHYSICAL EXAM  /70   Wt 86.1 kg (189 lb 13.1 oz)   LMP 09/15/2022 (Approximate)   BMI 28.86 kg/m²     GENERAL: No acute distress  HEENT: Normocephalic, atraumatic  NEURO: Alert and oriented x3  PSYCH: Normal mood and affect  PULMONARY: Non-labored respiration; no tachypnea  ABD: Soft, gravid, nontender.      ASSESSMENT AND PLAN     Problems (from 22 to present)     Problem Noted Resolved    Pregnancy 3/8/2023 by Angeles Morrow CNM No    Priority:  1 - High      Overview Addendum 2023  9:11 AM by Concepcion Martinez CNM     Connected Mom: ordered/enrolled (not synching at 23w visit)  Prepregnancy BMI: 27   Pap: 2022 - NILM; HR HPV negative  Dating - per 7w sono  U/S - no anomalies/Incomplete [x]Repeat MFM sono - Normal anatomy  Aneuploidy screening -  Blood type: A POS.   GDM screen -   Vaccines -   [ ]Flu - declines  [ ]Covid-19 -declines  [ ]TDAP - declines  Contraception -   Peds - Peds hospitalist -   Circ - N/A  GBS  [ ]Consents            Chronic cough 2019 by Floyd Tinsley MD No    Overview Signed 2023  9:09 AM by Concepcion Martinez CNM     Saw pulmonologist about 4 months ago. Was given an inhaler that she never used. Has not had an issue recently. Was never diagnosed with Asthma.              Education regarding 3T physiologic changes, lifestyle modifications (positioning, stretching, etc) and extensive conversation re: warning s/s PTL. Referral placed for Pelvic PT.  Reviewed 28wk labs - 3hr GTT today  Declines TDAP today   Is taking Childbirth Education classes - here and a working momma  class.  Education regarding options for postpartum contraception, patient reports they are likely planning vasectomy for partner. Considering minipill vs Mirena IUD until then.    Reviewed warning signs, normal FM,  labor precautions, and how/when to call.    Birth Center Risk Assessment: 0- Meets birth center guidelines    0- CNM management in ABC  1- CNM management on L&D  2- Consultation with OB to develop  plan of care  3- Collaborative CNM/OB management with delivery on L&D  4- Permanent referral of care to MD      Follow-up: 3 weeks, call or present sooner PRMARKIE Morrow CNM  OB/GYN

## 2023-04-26 ENCOUNTER — TELEPHONE (OUTPATIENT)
Dept: OBSTETRICS AND GYNECOLOGY | Facility: CLINIC | Age: 33
End: 2023-04-26
Payer: COMMERCIAL

## 2023-04-26 ENCOUNTER — TELEPHONE (OUTPATIENT)
Dept: MATERNAL FETAL MEDICINE | Facility: CLINIC | Age: 33
End: 2023-04-26
Payer: COMMERCIAL

## 2023-04-26 ENCOUNTER — PATIENT MESSAGE (OUTPATIENT)
Dept: MATERNAL FETAL MEDICINE | Facility: CLINIC | Age: 33
End: 2023-04-26
Payer: COMMERCIAL

## 2023-04-26 ENCOUNTER — PATIENT MESSAGE (OUTPATIENT)
Dept: OBSTETRICS AND GYNECOLOGY | Facility: CLINIC | Age: 33
End: 2023-04-26
Payer: COMMERCIAL

## 2023-04-26 DIAGNOSIS — O24.419 GESTATIONAL DIABETES MELLITUS (GDM) IN THIRD TRIMESTER, GESTATIONAL DIABETES METHOD OF CONTROL UNSPECIFIED: Primary | ICD-10-CM

## 2023-04-26 NOTE — TELEPHONE ENCOUNTER
Patient called to speak with midwife on call regarding her glucose results. Please call to advise.

## 2023-04-26 NOTE — TELEPHONE ENCOUNTER
Spoke with patient and scheduled her.    ----- Message from Abel Johnson MA sent at 4/26/2023  9:57 AM CDT -----  Patient called returning a call from you to schedule MFM Appointment.

## 2023-04-26 NOTE — TELEPHONE ENCOUNTER
Returned pt's call - extensive phone conversation/consultation regarding Gestational Diabetes. Discussed diagnosis, prognosis and anticipated progression of care - reviewed risks to herself and pregnancy with this, along with plan for managing. Reviewed orders placed and importance. Pt reports she has scheduled an appt for Diabetic Education already, along with MFM and plans to attend these. Discussed potential implications as pregnancy progresses, which could change, based on risk status and we continue to monitor closely. All questions answered and pt v/u.      I spent a total of 16 minutes on the phone today with pt.   I spent a total of 22 minutes on the day of the visit. This includes non-face to face time preparing to see the patient (eg, review of tests), obtaining and/or reviewing separately obtained history, Documenting clinical information in the electronic or other health record, Independently interpreting results and communicating results to the patient/family/caregiver, or Care coordination.     Angeles Morrow, STEVEM, MSN

## 2023-05-02 ENCOUNTER — PATIENT MESSAGE (OUTPATIENT)
Dept: MATERNAL FETAL MEDICINE | Facility: CLINIC | Age: 33
End: 2023-05-02
Payer: COMMERCIAL

## 2023-05-03 ENCOUNTER — PROCEDURE VISIT (OUTPATIENT)
Dept: MATERNAL FETAL MEDICINE | Facility: CLINIC | Age: 33
End: 2023-05-03
Payer: COMMERCIAL

## 2023-05-03 ENCOUNTER — OFFICE VISIT (OUTPATIENT)
Dept: MATERNAL FETAL MEDICINE | Facility: CLINIC | Age: 33
End: 2023-05-03
Payer: COMMERCIAL

## 2023-05-03 ENCOUNTER — CLINICAL SUPPORT (OUTPATIENT)
Dept: DIABETES | Facility: CLINIC | Age: 33
End: 2023-05-03
Payer: COMMERCIAL

## 2023-05-03 VITALS
SYSTOLIC BLOOD PRESSURE: 122 MMHG | DIASTOLIC BLOOD PRESSURE: 69 MMHG | HEIGHT: 68 IN | BODY MASS INDEX: 28.74 KG/M2 | WEIGHT: 189.63 LBS

## 2023-05-03 DIAGNOSIS — O24.410 DIET CONTROLLED GESTATIONAL DIABETES MELLITUS (GDM) IN THIRD TRIMESTER: ICD-10-CM

## 2023-05-03 DIAGNOSIS — Z36.89 ENCOUNTER FOR ULTRASOUND TO ASSESS FETAL GROWTH: Primary | ICD-10-CM

## 2023-05-03 PROCEDURE — 99999 PR PBB SHADOW E&M-EST. PATIENT-LVL III: CPT | Mod: PBBFAC,,, | Performed by: OBSTETRICS & GYNECOLOGY

## 2023-05-03 PROCEDURE — 3074F SYST BP LT 130 MM HG: CPT | Mod: CPTII,S$GLB,, | Performed by: OBSTETRICS & GYNECOLOGY

## 2023-05-03 PROCEDURE — 99999 PR PBB SHADOW E&M-EST. PATIENT-LVL III: ICD-10-PCS | Mod: PBBFAC,,, | Performed by: OBSTETRICS & GYNECOLOGY

## 2023-05-03 PROCEDURE — 76816 OB US FOLLOW-UP PER FETUS: CPT | Mod: S$GLB,,, | Performed by: OBSTETRICS & GYNECOLOGY

## 2023-05-03 PROCEDURE — 3078F PR MOST RECENT DIASTOLIC BLOOD PRESSURE < 80 MM HG: ICD-10-PCS | Mod: CPTII,S$GLB,, | Performed by: OBSTETRICS & GYNECOLOGY

## 2023-05-03 PROCEDURE — G0108 DIAB MANAGE TRN  PER INDIV: HCPCS | Mod: S$GLB,,, | Performed by: NUTRITIONIST

## 2023-05-03 PROCEDURE — 99214 PR OFFICE/OUTPT VISIT, EST, LEVL IV, 30-39 MIN: ICD-10-PCS | Mod: 25,S$GLB,, | Performed by: OBSTETRICS & GYNECOLOGY

## 2023-05-03 PROCEDURE — 3074F PR MOST RECENT SYSTOLIC BLOOD PRESSURE < 130 MM HG: ICD-10-PCS | Mod: CPTII,S$GLB,, | Performed by: OBSTETRICS & GYNECOLOGY

## 2023-05-03 PROCEDURE — 3078F DIAST BP <80 MM HG: CPT | Mod: CPTII,S$GLB,, | Performed by: OBSTETRICS & GYNECOLOGY

## 2023-05-03 PROCEDURE — 3008F PR BODY MASS INDEX (BMI) DOCUMENTED: ICD-10-PCS | Mod: CPTII,S$GLB,, | Performed by: OBSTETRICS & GYNECOLOGY

## 2023-05-03 PROCEDURE — G0108 PR DIAB MANAGE TRN  PER INDIV: ICD-10-PCS | Mod: S$GLB,,, | Performed by: NUTRITIONIST

## 2023-05-03 PROCEDURE — 99214 OFFICE O/P EST MOD 30 MIN: CPT | Mod: 25,S$GLB,, | Performed by: OBSTETRICS & GYNECOLOGY

## 2023-05-03 PROCEDURE — 3008F BODY MASS INDEX DOCD: CPT | Mod: CPTII,S$GLB,, | Performed by: OBSTETRICS & GYNECOLOGY

## 2023-05-03 PROCEDURE — 99999 PR PBB SHADOW E&M-EST. PATIENT-LVL III: ICD-10-PCS | Mod: PBBFAC,,, | Performed by: NUTRITIONIST

## 2023-05-03 PROCEDURE — 76816 US MFM PROCEDURE (VIEWPOINT): ICD-10-PCS | Mod: S$GLB,,, | Performed by: OBSTETRICS & GYNECOLOGY

## 2023-05-03 PROCEDURE — 1159F PR MEDICATION LIST DOCUMENTED IN MEDICAL RECORD: ICD-10-PCS | Mod: CPTII,S$GLB,, | Performed by: OBSTETRICS & GYNECOLOGY

## 2023-05-03 PROCEDURE — 99999 PR PBB SHADOW E&M-EST. PATIENT-LVL III: CPT | Mod: PBBFAC,,, | Performed by: NUTRITIONIST

## 2023-05-03 PROCEDURE — 1159F MED LIST DOCD IN RCRD: CPT | Mod: CPTII,S$GLB,, | Performed by: OBSTETRICS & GYNECOLOGY

## 2023-05-03 RX ORDER — AMOXICILLIN 500 MG
1 CAPSULE ORAL DAILY
Status: ON HOLD | COMMUNITY
End: 2023-07-05

## 2023-05-03 NOTE — ASSESSMENT & PLAN NOTE
I counseled the patient regarding the risks of gestational diabetes, which include macrosomia, hypertensive disorders of pregnancy,  hypoglycemia, shoulder dystocia/birth trauma, polyhydramnios, and increased risk of  delivery.  Patients requiring medical therapy have an increased risk of stillbirth.  Discussed that  outcome is linked to her ability to achieve glycemic control.  The goal of treatment is to have a fasting blood sugar between 70 and 95 mg/dL and 2 hour postprandials less than 120 mg/dL.  Therapy will likely consist of therapy with metformin or insulin. Approximately 30-50% of the time, women who are well-controlled on metformin may require the addition of insulin as the pregnancy progresses. Glyburide therapy has demonstrated inferior results as compared to metformin and insulin, and therefore, is not a first-line choice. Antepartum testing is indicated for those patients requiring medical therapy to reduce the incidence of fetal demise. We discussed dietary counseling and appropriate exercise to improve peripheral insulin resistance. We discussed the frequency of blood sugar monitoring and goal blood sugars. She has not met with a diabetic educator this pregnancy. I would recommend obtaining serial growth ultrasounds in the third trimester to monitor for macrosomia.    Most women with GDM are cured by delivery. All medications can be stopped postpartum. However, some women with GDM have undiagnosed type 2 diabetes. Therefore, I recommend obtaining a postpartum fasting blood sugar prior to discharge. Approximately 20% of women with GDM will have glucose intolerance or type 2 DM at the postpartum visit. A 2 hour glucose tolerance test should be performed 6-8 weeks postpartum. If the patient is breastfeeding, it is reasonable to delay this as appropriate. Additionally, women with GDM are at increased risk of developing type 2 DM later in life. Therefore, establishing with a  primary MD who can perform annual diabetes screening is appropriate.     Recommendations:   CNM should review blood sugars in 1 week via portal; We reinforced checking 4 x/day and reinforced goal blood sugars. Please contact MFM if >50% of values are outside targeted range in any column on BG log.   Regimen: Diet control. Patient sees DM education today.    If medications are required, recommend growth scans every 4-6 weeks, starting at 28 weeks gestation   If medications are required, recommend starting antepartum testing at 32 weeks gestation (weekly NST+AFV or BPP); twice weekly testing is recommended if blood sugars are poorly controlled.   -Antepartum testing should be started at 40 weeks for women who do NOT require medications.   Check fasting blood sugar in hospital postpartum.   If normal, discontinue therapy and monitoring and recommend repeat postpartum glucose testing in 6-8 weeks postpartum using a 75 g oral glucose tolerance test.   If fasting blood glucose is between 100-125 mg/dl or impaired glucose tolerance is noted on 2 hour glucose test, refer to primary care as appropriate.    An ultrasound for estimated fetal weight should be obtained within 3 weeks of anticipated delivery; if the EFW is >= 4500 grams, a  should be offered.    Delivery timing:  Well controlled with diet: 39 0/7 - 40 6/7 weeks gestation  Oral agent or insulin required: 39 0/7 - 39 6/ 7 weeks gestation  Poorly controlled on oral agent or insulin: 38 0/7 - 38 6/7 weeks gestation

## 2023-05-03 NOTE — PROGRESS NOTES
Maternal Fetal Medicine consult    Provider requesting consultation: Suresh PEREZ service    SUBJECTIVE:     Keara Wood is a 32 y.o.  female with IUP at 31w2d who is seen in consultation by MFM for evaluation and management of:  Problem   Gestational Diabetes Mellitus (Gdm) in Third Trimester       OB HX:  G1 current    Past Medical History:   Diagnosis Date    Allergic rhinitis with postnasal drip 2019    Anxiety     Chronic cough 2019    Eczema 2019     Past Surgical History:   Procedure Laterality Date    CARPAL TUNNEL RELEASE Left 2022    Procedure: RELEASE, CARPAL TUNNEL;  Surgeon: Bipin Newman II, MD;  Location: [facility];  Service: Orthopedics;  Laterality: Left;    CHOLECYSTECTOMY      gallblader removal      around age 19    LIPOSUCTION OF ABDOMEN           Social History     Tobacco Use    Smoking status: Never     Passive exposure: Never    Smokeless tobacco: Never   Substance Use Topics    Alcohol use: Not Currently     Alcohol/week: 1.0 standard drink     Types: 1 Glasses of wine per week     Comment: 1-2x/week    Drug use: No     Review of patient's allergies indicates:   Allergen Reactions    Penicillins Hives    Voltaren [diclofenac sodium]      Broke out in rash     Medications:  PNV    Records reviewed    OBJECTIVE:     Blood Pressure: 122/69  Weight: 73kg    Ultrasound performed. See viewpoint for full ultrasound report.  Fetal size is AGA with the EFW at the 29% and the AC at the 31%. The EFW is 1710 g.  A limited repeat fetal anatomic survey shows no abnormalities of the structures that were adequately imaged.  AFV is normal.     BG log reviewed:        ASSESSMENT/PLAN:     32 y.o.  female with IUP at 31w2d    Gestational diabetes mellitus (GDM) in third trimester  I counseled the patient regarding the risks of gestational diabetes, which include macrosomia, hypertensive disorders of pregnancy,  hypoglycemia, shoulder dystocia/birth trauma,  polyhydramnios, and increased risk of  delivery.  Patients requiring medical therapy have an increased risk of stillbirth.  Discussed that  outcome is linked to her ability to achieve glycemic control.  The goal of treatment is to have a fasting blood sugar between 70 and 95 mg/dL and 2 hour postprandials less than 120 mg/dL.  Therapy will likely consist of therapy with metformin or insulin. Approximately 30-50% of the time, women who are well-controlled on metformin may require the addition of insulin as the pregnancy progresses. Glyburide therapy has demonstrated inferior results as compared to metformin and insulin, and therefore, is not a first-line choice. Antepartum testing is indicated for those patients requiring medical therapy to reduce the incidence of fetal demise. We discussed dietary counseling and appropriate exercise to improve peripheral insulin resistance. We discussed the frequency of blood sugar monitoring and goal blood sugars. She has not met with a diabetic educator this pregnancy. I would recommend obtaining serial growth ultrasounds in the third trimester to monitor for macrosomia.    Most women with GDM are cured by delivery. All medications can be stopped postpartum. However, some women with GDM have undiagnosed type 2 diabetes. Therefore, I recommend obtaining a postpartum fasting blood sugar prior to discharge. Approximately 20% of women with GDM will have glucose intolerance or type 2 DM at the postpartum visit. A 2 hour glucose tolerance test should be performed 6-8 weeks postpartum. If the patient is breastfeeding, it is reasonable to delay this as appropriate. Additionally, women with GDM are at increased risk of developing type 2 DM later in life. Therefore, establishing with a primary MD who can perform annual diabetes screening is appropriate.     Recommendations:  CNM should review blood sugars in 1 week via portal; We reinforced checking 4 x/day and reinforced  goal blood sugars. Please contact MFM if >50% of values are outside targeted range in any column on BG log.  Regimen: Diet control. Patient sees DM education today.   If medications are required, recommend growth scans every 4-6 weeks, starting at 28 weeks gestation  If medications are required, recommend starting antepartum testing at 32 weeks gestation (weekly NST+AFV or BPP); twice weekly testing is recommended if blood sugars are poorly controlled.   -Antepartum testing should be started at 40 weeks for women who do NOT require medications.  Check fasting blood sugar in hospital postpartum.   If normal, discontinue therapy and monitoring and recommend repeat postpartum glucose testing in 6-8 weeks postpartum using a 75 g oral glucose tolerance test.   If fasting blood glucose is between 100-125 mg/dl or impaired glucose tolerance is noted on 2 hour glucose test, refer to primary care as appropriate.   An ultrasound for estimated fetal weight should be obtained within 3 weeks of anticipated delivery; if the EFW is >= 4500 grams, a  should be offered.    Delivery timing:  Well controlled with diet: 39 0/7 - 40 6/7 weeks gestation  Oral agent or insulin required: 39 0/7 - 39 6/ 7 weeks gestation  Poorly controlled on oral agent or insulin: 38 0/7 - 38 6/7 weeks gestation      FOLLOW UP:   Growth ultrasound has been scheduled in 3-4 weeks  No MFM MD visits have been scheduled. Please contact [facility] if there are any concerns regarding abnormal BG values so follow up MFM consultation can be scheduled.     Today I have spent 30 minutes in the care of the patient. This includes face to face time and non-face to face time preparing to see the patient (eg, review of tests), obtaining and/or reviewing separately obtained history, documenting clinical information in the electronic or other health record, independently interpreting results and communicating results to the patient/family/caregiver, or care coordination.      Donita Ritter MD  Maternal Fetal Medicine

## 2023-05-03 NOTE — PROGRESS NOTES
Diabetes Care Specialist Progress Note  Author: Suma Matta RD  Date: 5/3/2023    Referral: 4/25/23      Program Intake  Reason for Diabetes Program Visit:: Initial Diabetes Assessment  Current diabetes risk level:: low (31 weeks gestation)  In the last 12 months, have you:: none  Permission to speak with others about care:: no    No results found for: LABA1C, HGBA1C    Clinical    Patient Health Rating  Compared to other people your age, how would you rate your health?: Good    Problem Review  Reviewed Problem List with Patient: yes  Active comorbidities affecting diabetes self-care.: no  Reviewed health maintenance: yes    Clinical Assessment  Current Diabetes Treatment:  (No DM medicine)  Have you ever experienced hypoglycemia (low blood sugar)?: no  Have you ever experienced hyperglycemia (high blood sugar)?: no         Labs  Do you have regular lab work to monitor your medications?: Yes  Type of Regular Lab Work: Other  Where do you get your labs drawn?: [facility]  Lab Compliance Barriers: No    Nutritional Status  Diet: Diabetic diet  Meal Plan 24 Hour Recall: Breakfast, Lunch, Dinner, Snack (drinks only water)  Meal Plan 24 Hour Recall - Breakfast:  (2 eggs, fruit OR 2 toast, avocado, fruit, cheese/PNB)  Meal Plan 24 Hour Recall - Lunch:  ([employer]--bowl with chicken, brown rice, corn, beans, lettuce, salsa, sour cream)  Meal Plan 24 Hour Recall - Dinner:  (grilled chicken, broccoli, yellow rice)  Meal Plan 24 Hour Recall - Snack:  (2-3 times daily fruit with cheese OR PNB)  Change in appetite?: No  Dentation:: Intact  Recent Changes in Weight: Weight Gain (due to pregnancy)  Current nutritional status an area of need that is impacting patient's ability to self-manage diabetes?: No    Additional Social History    Support  Does anyone support you with your diabetes care?: yes  Who supports you?: self, family member  Who takes you to your medical appointments?: self  Does the current support meet the patient's  needs?: Yes  Is Support an area impacting ability to self-manage diabetes?: No    Access to Mass Media & Technology  Does the patient have access to any of the following devices or technologies?: Smart phone  Media or technology needs impacting ability to self-manage diabetes?: No    Cognitive/Behavioral Health  Alert and Oriented: Yes  Difficulty Thinking: No  Requires Prompting: No  Requires assistance for routine expression?: No  Cognitive or behavioral barriers impacting ability to self-manage diabetes?: No    Culture/Baptism  Culture or Anabaptist beliefs that may impact ability to access healthcare: No    Communication  Language preference: English  Hearing Problems: No  Vision Problems: No  Communication needs impacting ability to self-manage diabetes?: No    Health Literacy  Preferred Learning Method: Face to Face  How often do you need to have someone help you read instructions, pamphlets, or written material from your doctor or pharmacy?: Never  Health literacy needs impacting ability to self-manage diabetes?: No      Diabetes Self-Management Skills Assessment    Diabetes Disease Process/Treatment Options  Patient/caregiver able to state what happens when someone has diabetes.: yes  Patient/caregiver knows what type of diabetes they have.: yes  Diabetes Type : Gestational  Patient/caregiver able to identify at least three signs and symptoms of diabetes.: yes  Identified signs and symptoms:: blurred vision, fatigue, frequent infections, frequent urination, increased thirst  Patient able to identify at least three risk factors for diabetes.: yes  Identified risk factors:: family history, ethnicity  Diabetes Disease Process/Treatment Options: Skills Assessment Completed: Yes  Assessment indicates:: Adequate understanding  Area of need?: No    Nutrition/Healthy Eating  Challenges to healthy eating:: other (see comments) (appropriate eating due to pregnancy)  Method of carbohydrate measurement:: carb  counting/reading labels  Patient can identify foods that impact blood sugar.: yes  Patient-identified foods:: fruit/fruit juice, milk, soda, starchy vegetables (corn, peas, beans), starches (bread, pasta, rice, cereal), sweets, yogurt  Nutrition/Healthy Eating Skills Assessment Completed:: Yes  Assessment indicates:: Instruction Needed  Area of need?: Yes    Physical Activity/Exercise  Patient's daily activity level:: lightly active  Patient formally exercises outside of work.: yes  Exercise Type: walking (walks 1-2 times weekly)  Intensity: Low  Frequency: twice a week  Duration: 30 min  Patient can identify forms of physical activity.: yes  Stated forms of physical activity:: any movement performed by muscles that uses energy  Patient can identify reasons why exercise/physical activity is important in diabetes management.: yes  Identified reasons:: strengthens heart, muscles, and bones, tones muscles, lowers blood glucose, blood pressure, and cholesterol, lowers risk of heart disease and stroke, relieves stress  Physical Activity/Exercise Skills Assessment Completed: : Yes  Assessment indicates:: Adequate understanding  Area of need?: No         Home Blood Glucose Monitoring  Patient states that blood sugar is checked at home daily.: yes  Monitoring Method:: home glucometer  How often do you check your blood sugar?: 3 times a day  When do you check your blood sugar?: Before breakfast, 2 hours after meal  When you check what is your typical blood sugar range? :  (fasting AM= ;  2 hours after b'fast= 63-97, after lunch ; after supper=)  Blood glucose logs:: yes (in chart note today from Dr Donita Ritter)  Blood glucose logs reviewed today?: yes  Home Blood Glucose Monitoring Skills Assessment Completed: : Yes  Assessment indicates:: Adequate understanding  Area of need?: No    Acute Complications  Patient is able to identify types of acute complications: Yes  Patient Identified::  Hypoglycemia  Patient is able to state the basic meaning of hypoglycemia?: Yes  Able to state the blood sugar range for hypoglycemia?: no (see comments)  Patient can identify general symptoms of hypoglycemia: no (see comments)  Able to state proper treatment of hypoglycemia?: no (see comments)  Acute Complications Skills Assessment Completed: : Yes  Assessment indicates:: Instruction Needed  Area of need?: Yes (Reviewed and discussed today)    Chronic Complications  Patient can identify major chronic complications of diabetes.: yes  Stated chronic complications:: heart disease/heart attack, kidney disease, neuropathy/nerve damage, retinopathy, sexual dysfunction, stroke  Patient can identify ways to prevent or delay diabetes complications.: yes  Stated ways to prevent complications:: healthy eating and regular activity, controlling blood sugar  Patient is aware that having diabetes increases risk of heart disease?: Yes  Patient is aware that heart disease is the leading cause of death and disability in people with diabetes?: Yes  Patient able to state risk factors for heart disease?: Yes  Patient stated risk factors for heart disease:: High blood pressure, High cholesterol, Diet, Limited activity, Medication non-adherance, Having diabetes  Patient is taking statin?: No  Do you want more information on Statins?: No  Chronic Complications Skills Assessment Completed: : Yes  Assessment indicates:: Adequate understanding  Area of need?: No    Psychosocial/Coping  Patient can identify ways of coping with chronic disease.: yes  Patient-stated ways of coping with chronic disease:: support from loved ones  Psychosocial/Coping Skills Assessment Completed: : Yes  Assessment indicates:: Adequate understanding  Area of need?: No      Diabetes Self Support Plan         Assessment Summary and Plan    Based on today's diabetes care assessment, the following areas of need were identified:      Social 5/3/2023   Support No   Access  to Mass Media/Tech No   Cognitive/Behavioral Health No   Culture/Cheondoism No   Communication No   Health Literacy No        Clinical 5/3/2023   Lab Compliance No   Nutritional Status No        Diabetes Self-Management Skills 5/3/2023   Diabetes Disease Process/Treatment Options No   Nutrition/Healthy Eating Yes--see Care Plan   Physical Activity/Exercise No   Home Blood Glucose Monitoring No   Acute Complications Yes--discussed hypoglycemia in detail; reviewed printed info since treating hypo is different during pregnancy   Chronic Complications No   Psychosocial/Coping No          Today's interventions were provided through individual discussion, instruction, and written materials were provided.      Patient verbalized understanding of instruction and written materials.  Pt was able to return back demonstration of instructions today. Patient understood key points, needs reinforcement and further instruction.     Diabetes Self-Management Care Plan:    Today's Diabetes Self-Management Care Plan was developed with Keara's input. Keara has agreed to work toward the following goal(s) to improve his/her overall diabetes control.      Care Plan: Diabetes Management   Updates made since 4/3/2023 12:00 AM        Problem: Healthy Eating         Goal: Eat 3 meals daily with snacks (if needed) with appropriate carb intake due to pregnancy    Start Date: 5/3/2023   Expected End Date: 8/3/2023   Priority: High   Barriers: No Barriers Identified   Note:    Pt has very good oral intake--eating regular meals w/snacks as needed; only drinking water; combining carbs w/PRO at meals and snacks; no sugary sweets.  Thoroughly reviewed appropriate meal plan for pt during pregnancy (below) making sure pt understands there are different ways to determine appropriate portions (fist, list, label).      Breakfast: 30-45 grams carbs (2-3 svgs)  Snack: 15-30 grams carbs (1-2 svgs)  Lunch: 45-60 grams carbs (3-4 svgs)  Snack: 15-30 grams  carbs (1-2 svgs)  Dinner: 45-60 grams carbs (3-4 svgs)  Snack: 15-30 grams carbs (1-2 svgs)       Task: Reviewed the sources and role of Carbohydrate, Protein, and Fat and how each nutrient impacts blood sugar. Completed 5/3/2023        Task: Provided visual examples using dry measuring cups, food models, and other familiar objects such as computer mouse, deck or cards, tennis ball etc. to help with visualization of portions. Completed 5/3/2023        Task: Discussed strategies for choosing healthier menu options when dining out. Completed 5/3/2023        Task: Review the importance of balancing carbohydrates with each meal using portion control techniques to count servings of carbohydrate and label reading to identify serving size and amount of total carbs per serving. Completed 5/3/2023        Task: Provided Sample plate method and reviewed the use of the plate to estimate amounts of carbohydrate per meal. Completed 5/3/2023          Follow Up Plan     Follow up Pt knows how to reach Educator by phone or [facility].    Today's care plan and follow up schedule was discussed with patient.  Keara verbalized understanding of the care plan, goals, and agrees to follow up plan.        The patient was encouraged to communicate with his/her health care provider/physician and care team regarding his/her condition(s) and treatment.  I provided the patient with my contact information today and encouraged to contact me via phone or [facility] Patient Portal as needed.     Length of Visit   Total Time: 50 Minutes

## 2023-05-08 ENCOUNTER — PATIENT MESSAGE (OUTPATIENT)
Dept: OBSTETRICS AND GYNECOLOGY | Facility: CLINIC | Age: 33
End: 2023-05-08
Payer: COMMERCIAL

## 2023-05-08 ENCOUNTER — PATIENT MESSAGE (OUTPATIENT)
Dept: OTHER | Facility: OTHER | Age: 33
End: 2023-05-08
Payer: COMMERCIAL

## 2023-05-11 ENCOUNTER — ROUTINE PRENATAL (OUTPATIENT)
Dept: OBSTETRICS AND GYNECOLOGY | Facility: CLINIC | Age: 33
End: 2023-05-11
Payer: COMMERCIAL

## 2023-05-11 VITALS
SYSTOLIC BLOOD PRESSURE: 114 MMHG | WEIGHT: 190.25 LBS | BODY MASS INDEX: 28.93 KG/M2 | DIASTOLIC BLOOD PRESSURE: 68 MMHG

## 2023-05-11 DIAGNOSIS — O26.893 PELVIC PRESSURE IN PREGNANCY, ANTEPARTUM, THIRD TRIMESTER: ICD-10-CM

## 2023-05-11 DIAGNOSIS — Z3A.32 32 WEEKS GESTATION OF PREGNANCY: ICD-10-CM

## 2023-05-11 DIAGNOSIS — R10.2 PELVIC PRESSURE IN PREGNANCY, ANTEPARTUM, THIRD TRIMESTER: ICD-10-CM

## 2023-05-11 DIAGNOSIS — O24.410 DIET CONTROLLED GESTATIONAL DIABETES MELLITUS (GDM) IN THIRD TRIMESTER: Primary | ICD-10-CM

## 2023-05-11 PROCEDURE — 99999 PR PBB SHADOW E&M-EST. PATIENT-LVL II: ICD-10-PCS | Mod: PBBFAC,,, | Performed by: ADVANCED PRACTICE MIDWIFE

## 2023-05-11 PROCEDURE — 0502F PR SUBSEQUENT PRENATAL CARE: ICD-10-PCS | Mod: CPTII,S$GLB,, | Performed by: ADVANCED PRACTICE MIDWIFE

## 2023-05-11 PROCEDURE — 0502F SUBSEQUENT PRENATAL CARE: CPT | Mod: CPTII,S$GLB,, | Performed by: ADVANCED PRACTICE MIDWIFE

## 2023-05-11 PROCEDURE — 99999 PR PBB SHADOW E&M-EST. PATIENT-LVL II: CPT | Mod: PBBFAC,,, | Performed by: ADVANCED PRACTICE MIDWIFE

## 2023-05-11 NOTE — LETTER
May 11, 2023    Keara Wood  [address] [place] [address]         [facility]  [address]  [place] [address]  Phone: [phone]  Fax: [phone] May 11, 2023     Patient: Keara Wood   YOB: 1990   Date of Visit: 5/11/2023       To Whom It May Concern:    It is my medical opinion that Keara Wood should be considered for work from home from home from around 5/17 through the duration of her pregnancy. She is awaiting physical therapy for pelvic pressure and is quickly approaching the gestation at which we recommend against travel..    If you have any questions or concerns, please don't hesitate to call.    Sincerely,        Ailyn Goldstein CNM

## 2023-05-11 NOTE — PROGRESS NOTES
"32 y.o. female  at 32w3d, by Estimated Date of Delivery: 7/3/23    Complaining of pelvic pressure which is worse during long drives, often leading to pt pulling over on the side of the road to stretch. Awaiting next available PT appointment in .   Concern for requirement to carry firearm and potential for exposure to physical violence at work.  Glucose logs reviewed; adequate glycemia on diet. Pt will upload to portal.  Reviewed TW lbs    ROS  OBSTETRICS:   Contractions No   Bleeding No   Loss of fluid No   Fetal mvmnt present  GASTRO:   Nausea No   Vomiting No      OB History    Para Term  AB Living   2 1 1     1   SAB IAB Ectopic Multiple Live Births           1      # Outcome Date GA Lbr Edmond/2nd Weight Sex Delivery Anes PTL Lv   2 Current            1 Term 14 40w0d  2.92 kg (6 lb 7 oz) F Vag-Spont None N ISAAC       Dating reviewed  Allergies and problem list reviewed and updated  Medical and surgical history reviewed  Prenatal labs reviewed and updated    PHYSICAL EXAM  /68   Wt 86.3 kg (190 lb 4.1 oz)   LMP 09/15/2022 (Approximate)   BMI 28.93 kg/m²     GENERAL: No acute distress  HEENT: Normocephalic, atraumatic  NEURO: Alert and oriented x3  PSYCH: Normal mood and affect  PULMONARY: Non-labored respiration; no tachypnea  ABD: Soft, gravid, nontender.      ASSESSMENT AND PLAN     Problems (from 22 to present)     Problem Noted Resolved    Gestational diabetes mellitus (GDM) in third trimester 2023 by Angeles Morrow CNM No    Overview Addendum 5/3/2023  1:40 PM by Angeles Morrow CNM     [x]32w growth - "Fetal size is AGA with the EFW at the 29% and the AC at the 31%. The EFW is 1710 g."  [x]MFM consult  [x]Dietician consult  [ ]~36w growth    Delivery timing recommendations: Well controlled with diet: 39 0/7 - 40 6/7 weeks gestation         Pregnancy 3/8/2023 by Angeles Morrow CNM No    Overview Addendum 2023  9:11 AM by Concepcion Martinez CNM     " Connected Mom: ordered/enrolled (not synching at 23w visit)  Prepregnancy BMI: 27   Pap: 2022 - NILM; HR HPV negative  Dating - per 7w sono  U/S - no anomalies/Incomplete [x]Repeat MFM sono - Normal anatomy  Aneuploidy screening -  Blood type: A POS.   GDM screen -   Vaccines -   [ ]Flu - declines  [ ]Covid-19 -declines  [ ]TDAP - declines  Contraception -   Peds - Peds hospitalist -   Circ - N/A  GBS  [ ]Consents                 Patient plans to breast feed - reviewed breast feeding class here.    Reviewed warning signs, normal FM,  labor precautions, and how/when to call.      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 Drives to [place] for work. Discussed letter recommending accommodations to be made so that patient can minimize long distance travel and associated risks.    Follow-up: 2 weeks, call or present sooner PRN

## 2023-05-18 ENCOUNTER — TELEPHONE (OUTPATIENT)
Dept: OBSTETRICS AND GYNECOLOGY | Facility: CLINIC | Age: 33
End: 2023-05-18
Payer: COMMERCIAL

## 2023-05-18 ENCOUNTER — PATIENT MESSAGE (OUTPATIENT)
Dept: OBSTETRICS AND GYNECOLOGY | Facility: CLINIC | Age: 33
End: 2023-05-18
Payer: COMMERCIAL

## 2023-05-23 ENCOUNTER — ROUTINE PRENATAL (OUTPATIENT)
Dept: OBSTETRICS AND GYNECOLOGY | Facility: CLINIC | Age: 33
End: 2023-05-23
Payer: COMMERCIAL

## 2023-05-23 VITALS
DIASTOLIC BLOOD PRESSURE: 70 MMHG | SYSTOLIC BLOOD PRESSURE: 114 MMHG | BODY MASS INDEX: 28.83 KG/M2 | WEIGHT: 189.63 LBS

## 2023-05-23 DIAGNOSIS — Z3A.34 34 WEEKS GESTATION OF PREGNANCY: ICD-10-CM

## 2023-05-23 DIAGNOSIS — O98.319 GENITAL HERPES SIMPLEX VIRUS (HSV) INFECTION IN MOTHER AFFECTING PREGNANCY: ICD-10-CM

## 2023-05-23 DIAGNOSIS — O24.410 DIET CONTROLLED GESTATIONAL DIABETES MELLITUS (GDM) IN THIRD TRIMESTER: Primary | ICD-10-CM

## 2023-05-23 DIAGNOSIS — A60.09 GENITAL HERPES SIMPLEX VIRUS (HSV) INFECTION IN MOTHER AFFECTING PREGNANCY: ICD-10-CM

## 2023-05-23 PROCEDURE — 0502F SUBSEQUENT PRENATAL CARE: CPT | Mod: CPTII,S$GLB,, | Performed by: ADVANCED PRACTICE MIDWIFE

## 2023-05-23 PROCEDURE — 99999 PR PBB SHADOW E&M-EST. PATIENT-LVL II: ICD-10-PCS | Mod: PBBFAC,,, | Performed by: ADVANCED PRACTICE MIDWIFE

## 2023-05-23 PROCEDURE — 99999 PR PBB SHADOW E&M-EST. PATIENT-LVL II: CPT | Mod: PBBFAC,,, | Performed by: ADVANCED PRACTICE MIDWIFE

## 2023-05-23 PROCEDURE — 0502F PR SUBSEQUENT PRENATAL CARE: ICD-10-PCS | Mod: CPTII,S$GLB,, | Performed by: ADVANCED PRACTICE MIDWIFE

## 2023-05-23 RX ORDER — ACYCLOVIR 400 MG/1
400 TABLET ORAL 3 TIMES DAILY
Qty: 90 TABLET | Refills: 1 | Status: SHIPPED | OUTPATIENT
Start: 2023-05-23 | End: 2023-06-08

## 2023-05-23 NOTE — PROGRESS NOTES
"32 y.o. female  at 34w1d, by Estimated Date of Delivery: 7/3/23    Complaints today: none. Doing well today. Reviewed glucose logs; adequate glycemia on diet.  Reviewed TW lbs    ROS  OBSTETRICS:   Contractions No   Bleeding No   Loss of fluid No   Fetal mvmnt present  GASTRO:   Nausea No   Vomiting No      OB History    Para Term  AB Living   2 1 1     1   SAB IAB Ectopic Multiple Live Births           1      # Outcome Date GA Lbr Edmond/2nd Weight Sex Delivery Anes PTL Lv   2 Current            1 Term 14 40w0d  2.92 kg (6 lb 7 oz) F Vag-Spont None N ISAAC       Dating reviewed  Allergies and problem list reviewed and updated  Medical and surgical history reviewed  Prenatal labs reviewed and updated    PHYSICAL EXAM  /70   Wt 86 kg (189 lb 9.5 oz)   LMP 09/15/2022 (Approximate)   BMI 28.83 kg/m²     GENERAL: No acute distress  HEENT: Normocephalic, atraumatic  NEURO: Alert and oriented x3  PSYCH: Normal mood and affect  PULMONARY: Non-labored respiration; no tachypnea  ABD: Soft, gravid, nontender.      ASSESSMENT AND PLAN     Problems (from 22 to present)     Problem Noted Resolved    Gestational diabetes mellitus (GDM) in third trimester 2023 by Angeles Morrow CNM No    Overview Addendum 5/3/2023  1:40 PM by Angeles Morrow CNM     [x]32w growth - "Fetal size is AGA with the EFW at the 29% and the AC at the 31%. The EFW is 1710 g."  [x]MFM consult  [x]Dietician consult  [ ]~36w growth    Delivery timing recommendations: Well controlled with diet: 39 0/7 - 40 6/7 weeks gestation         Pregnancy 3/8/2023 by Angeles Morrow CNM No    Overview Addendum 2023  9:11 AM by Concepcion Martinez CNM     Connected Mom: ordered/enrolled (not synching at 23w visit)  Prepregnancy BMI: 27   Pap: 2022 - NILM; HR HPV negative  Dating - per 7w sono  U/S - no anomalies/Incomplete [x]Repeat MFM sono - Normal anatomy  Aneuploidy screening -  Blood type: A POS.   GDM screen - " GDM  Vaccines -   [ ]Flu - declines  [ ]Covid-19 -declines  [ ]TDAP - declines  Contraception -   Peds - Peds hospitalist -   Circ - N/A  GBS  [ ]Consents                 Reviewed upcoming 36wk labs.   Reviewed patient does possibly  have a history of genital HSV. Reports single episode of genital cold sores several years ago which resolved with valcyclovir, however, reports she was never formally tested and diagnosis was not confirmed.    Reviewed ABC risk assessment with the patient:    Birth Center Risk Assessment: 1-management on labor and Delivery    0- CNM management in ABC  1- CNM management on L&D  2- Consultation with OB to develop  plan of care  3- Collaborative CNM/OB management with delivery on L&D  4- Permanent referral of care to MD    Patient understands is currently not a candidate for the ABC.   Reviewed warning signs, normal FM,  labor precautions, and how/when to call.  Confirmed pt has after-hours number.  Keep f/u with MFM  Follow-up: 2 weeks, call or present sooner PRN

## 2023-05-29 ENCOUNTER — PATIENT MESSAGE (OUTPATIENT)
Dept: OTHER | Facility: OTHER | Age: 33
End: 2023-05-29
Payer: COMMERCIAL

## 2023-05-30 ENCOUNTER — PROCEDURE VISIT (OUTPATIENT)
Dept: MATERNAL FETAL MEDICINE | Facility: CLINIC | Age: 33
End: 2023-05-30
Payer: COMMERCIAL

## 2023-05-30 DIAGNOSIS — Z36.89 ENCOUNTER FOR ULTRASOUND TO ASSESS FETAL GROWTH: ICD-10-CM

## 2023-05-30 PROBLEM — B00.9 HSV INFECTION: Status: ACTIVE | Noted: 2023-05-30

## 2023-05-30 PROCEDURE — 76816 US MFM PROCEDURE (VIEWPOINT): ICD-10-PCS | Mod: S$GLB,,, | Performed by: OBSTETRICS & GYNECOLOGY

## 2023-05-30 PROCEDURE — 76816 OB US FOLLOW-UP PER FETUS: CPT | Mod: S$GLB,,, | Performed by: OBSTETRICS & GYNECOLOGY

## 2023-06-06 ENCOUNTER — PATIENT MESSAGE (OUTPATIENT)
Dept: OBSTETRICS AND GYNECOLOGY | Facility: CLINIC | Age: 33
End: 2023-06-06

## 2023-06-06 ENCOUNTER — ROUTINE PRENATAL (OUTPATIENT)
Dept: OBSTETRICS AND GYNECOLOGY | Facility: CLINIC | Age: 33
End: 2023-06-06
Payer: COMMERCIAL

## 2023-06-06 ENCOUNTER — CLINICAL SUPPORT (OUTPATIENT)
Dept: REHABILITATION | Facility: HOSPITAL | Age: 33
End: 2023-06-06
Payer: COMMERCIAL

## 2023-06-06 VITALS
DIASTOLIC BLOOD PRESSURE: 70 MMHG | WEIGHT: 190.56 LBS | BODY MASS INDEX: 28.98 KG/M2 | SYSTOLIC BLOOD PRESSURE: 114 MMHG

## 2023-06-06 DIAGNOSIS — Z3A.36 36 WEEKS GESTATION OF PREGNANCY: Primary | ICD-10-CM

## 2023-06-06 DIAGNOSIS — Z3A.30 30 WEEKS GESTATION OF PREGNANCY: ICD-10-CM

## 2023-06-06 DIAGNOSIS — R10.2 PELVIC PAIN IN PREGNANCY: ICD-10-CM

## 2023-06-06 DIAGNOSIS — O24.410 DIET CONTROLLED GESTATIONAL DIABETES MELLITUS (GDM) IN THIRD TRIMESTER: ICD-10-CM

## 2023-06-06 DIAGNOSIS — O26.899 PELVIC PAIN IN PREGNANCY: ICD-10-CM

## 2023-06-06 PROCEDURE — 0502F SUBSEQUENT PRENATAL CARE: CPT | Mod: CPTII,S$GLB,,

## 2023-06-06 PROCEDURE — 97162 PT EVAL MOD COMPLEX 30 MIN: CPT | Mod: PN

## 2023-06-06 PROCEDURE — 97530 THERAPEUTIC ACTIVITIES: CPT | Mod: PN

## 2023-06-06 PROCEDURE — 87081 CULTURE SCREEN ONLY: CPT

## 2023-06-06 PROCEDURE — 99999 PR PBB SHADOW E&M-EST. PATIENT-LVL II: CPT | Mod: PBBFAC,,,

## 2023-06-06 PROCEDURE — 99999 PR PBB SHADOW E&M-EST. PATIENT-LVL II: ICD-10-PCS | Mod: PBBFAC,,,

## 2023-06-06 PROCEDURE — 0502F PR SUBSEQUENT PRENATAL CARE: ICD-10-PCS | Mod: CPTII,S$GLB,,

## 2023-06-06 NOTE — PLAN OF CARE
[facility]  Pelvic Health Physical Therapy Initial Evaluation    Date: 2023   Name: Keara Wood  Clinic Number: [id number]  Therapy Diagnosis:   Encounter Diagnoses   Name Primary?    30 weeks gestation of pregnancy     Pelvic pain in pregnancy      Physician: Angeles Morrow, CHRIS    Physician Orders: PT Eval and Treat   Medical Diagnosis from Referral: 30 weeks gestation of pregnancy [Z3A.30], Pelvic pain in pregnancy [O26.899, R10.2]  Evaluation Date: 2023  Authorization Period Expiration: 2024  Plan of Care Expiration: 2023  Visit # / Visits authorized:     Time In: 11:00  Time Out: 12:00  Total Appointment Time (timed & untimed codes): 60 minutes    Precautions:   Due ; having baby at [facility].   Gestational Diabetes.     Subjective     Date of onset: current pregnancy     History of current condition - Keara reports: she's always had the pain but it's been worse since being pregnant. She played sports in  and college - stopped playing soccer in  and that's when she noticed the pain. Back then it wasn't too bad but as she's gotten older she's noticed it more.     OB/GYN History: , vaginal delivery, and perineal laceration  Sexually active? Yes  Pain with vaginal exams, intercourse or tampon use? Occasional pain that's related to hard time finding a good position. A couple times she's had pelvic area pain afterwards but it's not consistent.     Pain:  Location: lower back - left side. Can radiate down the back of her leg to her foot but usually stops mid-hamstring   Current 2/10, worst 8/10, best 0/10   Description: shooting/radiating/sharp   Aggravating Factors/Activities that cause symptoms: too much activity, laying on her back for too long (supine), sometimes heavy lifting around the house.   Easing Factors: time/rest, sometimes if her  squeezes her hips.     Bladder/Bowel History:   Frequency of urination:  "  Daytime: about 3-5x/day - varies if she has coffee or not.            Nighttime: about 6x/night, patient states she's been drinking more water too   Difficulty initiating urine stream: No  Urine stream: strong  Complete emptying: Not since pregnancy   Bladder leakage: Yes  Frequency of incidents: variable   Amount leaked (urine): a couple drops if she coughs or sneezes. No urge urinary incontinence   Urinary Urgency: Yes  Frequency of bowel movements: once every 2-3 days   Difficulty initiating BM: Yes  Does Patient Feel Empty after BM? Yes  Fiber Supplements or Laxative Use? No  Colon leakage: No  Form of protection: panty liner  Number of pads required in 24 hours: 1       Medical History: Keara  has a past medical history of Allergic rhinitis with postnasal drip (05/27/2019), Anxiety, Chronic cough (04/12/2019), and Eczema (04/12/2019).     Surgical History: Keara Wood  has a past surgical history that includes gallblader removal; Cholecystectomy; Liposuction of abdomen; and Carpal tunnel release (Left, 02/02/2022).    Medications: Keara has a current medication list which includes the following prescription(s): acyclovir, blood sugar diagnostic, blood-glucose meter, lancets, multivitamin, fish oil-omega-3 fatty acids, and prenatal 25/iron fum/folic/dha.    Allergies:   Review of patient's allergies indicates:   Allergen Reactions    Penicillins Hives    Voltaren [diclofenac sodium]      Broke out in rash        Prior Therapy/Previous treatment included: none for this condition   Social History: lives with their family  Current exercise: "not as much as I should" - she may take a walk once/week. Wasn't really exercising before pregnancy either, about 6-8 months before pregnancy she was, but then life got busy.   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  Prior Level of Function: independent   Current Level of Function: see above       Pts goals: resolve pain     OBJECTIVE     See EMR under MEDIA for written consent provided 6/6/2023  Chaperone: declined    ORTHO SCREEN    Posture in standing: increased lordosis due to late stage pregnancy   Pelvic alignment: no sign of deviations noted in supine   SI Joint Palpation: Tenderness to the left SI joint palpation.  Sacral spring test: positive left (Positive=NO spring)  Stork standing test: decreased ability to transfer load with left single leg stance, no difficulty with right single leg stance   SLR test: pain/difficulty with left SLR - decreased pain with compression     Hip Strength:  Hip Left Right   Flexion 4-/5 4+/5   Abduction 4-/5 4/5   External Rot 4-/5 4/5   Internal Rot 4+/5 4+/5       VAGINAL PELVIC FLOOR EXAM - deferred today     Limitation/Restriction for FOTO Pelvic Pain Survey    Therapist reviewed FOTO scores for Keara Wood on 6/6/2023.   FOTO documents entered into [employer] - see Media section.    Limitation Score: 50%       TREATMENT     Treatment Time In: 11:30  Treatment Time Out: 12:00  Total Treatment time (time-based codes) separate from Evaluation: 30 minutes      Therapeutic Activity Patient participated in dynamic functional therapeutic activities to improve functional performance for 30 minutes. Including: Education as described below.       Patient Education provided:   general anatomy/physiology of urinary/ bowel  system and benefits of treatment were discussed with the pt. Additionally, anatomy/physiology of pelvic floor, posture/body mechanices, and behavior modifications were reviewed.     Home Exercises provided:  Written Home Exercises provided: yes.  Exercises were reviewed and Keara was able to demonstrate them prior to the end of the session.    Keara demonstrated good  understanding of the education  provided.     See EMR under Patient Instructions for exercises provided 6/6/2023.    Assessment     Keara is a 32 y.o. female referred to outpatient Physical Therapy with a medical diagnosis of 30 weeks gestation of pregnancy [Z3A.30], Pelvic pain in pregnancy [O26.899, R10.2]. Pt presents with altered posture, poor knowledge of body mechanics and posture, poor trunk stability, increased frequency of urination, increased nocturia, poor coordination of pelvic floor muscles during ADL's leading to urinary or fecal leakage, dysfunctional voiding, dysfunctional defecation, and decreased hip and core strengthening.      Pt prognosis is Good.   Pt will benefit from skilled outpatient Physical Therapy to address the deficits stated above and in the chart below, provide pt/family education, and to maximize pt's level of independence.     Plan of care discussed with patient: Yes  Pt's spiritual, cultural and educational needs considered and patient is agreeable to the plan of care and goals as stated below:     Anticipated Barriers for therapy: late-stage pregnancy with due date 7/3/2023; history of back pain prior to pregnancy.     Medical Necessity is demonstrated by evaluation stated above.     Clinical Presentation evolving clinical presentation with changing clinical characteristics moderate   Decision Making/ Complexity Score: moderate       Goals:  Short Term Goals: 2 weeks   - Pt will demonstrate excellent knowledge and adherence to HEP to facilitate optimal recovery.    Long Term Goals: 4 weeks   - Pt will demonstrate excellent knowledge and adherence to HEP for continued self-maintenance of symptoms.  - Pt will report FOTO score of 40% limited or less indicating clinically relevant increase in function.  - Pt will report waking 2x/night to void for improved quality of sleep.  - Pt will report ability to delay urinary urge for at least 15 minutes to maintain continence with ADL/IADLs.   - Pt will report ability to  perform ADL/IADL with no more than 4/10 pain at worst for improved activity tolerance.   - Pt will demonstrate hip strength increase by 1/2 grade or more for improved pelvic stability needed for functional activity.       Plan     Plan of care Certification: 6/6/2023 to 7/6/2023.    Outpatient Physical Therapy 1-2 times weekly for 4 weeks to include the following interventions: therapeutic exercises, therapeutic activity, neuromuscular re-education, manual therapy, modalities PRN, patient/family education, dry needling, and self care/home management    Sujatha Bal, PT

## 2023-06-06 NOTE — PATIENT INSTRUCTIONS
Clamshells   - Start laying on your side, feet together and knees bent. Place orange resistance band above your knees  - Keep your feet together as you rotate your hip to bring your knee up. Don't let your pelvis rock forward.   - Repeat 3 sets of 12 on each side.        Side-lying Leg Lifts     - Start lying on your side with your bottom knee bent and your top leg straight. Slightly roll your top hip forward.   - Bring your top leg back slightly so that your leg is in a straight line with your trunk.   - Keeping your knee straight, raise your leg up towards the ceiling, then slowly lower back down. You should feel your muscle on the side of your hip/glute momo.   - Repeat 3 sets of 12 on each side.          Ball Squeezes     - Sit upright either on firm seat or exercise ball   - gently squeeze small ball as you exhale - hold for about 3-5 seconds   - inhale as you relax   - perform 3 sets of 12 repetitions       Home Exercise Program: 06/06/2023    DOUBLE VOIDING - Double voiding is a technique that may assist the bladder to empty more effectively when  urine is left in the bladder. It involves passing  urine more than once each time that you go to  the toilet. This makes sure that the bladder is  completely empty.    Here are 3 strategies you can try to fully empty your bladder.  You do not have to do all of these things every single time. Find which ones work best for you.     Check to make sure your pelvic floor is relaxed   Do a body scan - make sure your legs, buttocks, and abdominals are relaxed.  Take a couple deep, slow breaths to encourage your pelvic floor muscles to DROP (ie. Try Diaphragmatic Breathing).  Gently apply pressure over your bladder.  Change your pelvic position: lean forward, rock your pelvis forward and backward, stand up then sit back down.     Wait at least 30 seconds to 1 minute to see if a second urine stream begins.     Do not stop your urine stream or  push/strain!

## 2023-06-06 NOTE — PROGRESS NOTES
[facility]  Pelvic Health Physical Therapy Initial Evaluation    Date: 2023   Name: Keara Wood  Clinic Number: [id number]  Therapy Diagnosis:   Encounter Diagnoses   Name Primary?    30 weeks gestation of pregnancy     Pelvic pain in pregnancy      Physician: Angeles Morrow, CHRIS    Physician Orders: PT Eval and Treat   Medical Diagnosis from Referral: 30 weeks gestation of pregnancy [Z3A.30], Pelvic pain in pregnancy [O26.899, R10.2]  Evaluation Date: 2023  Authorization Period Expiration: 2024  Plan of Care Expiration: 2023  Visit # / Visits authorized:     Time In: 11:00  Time Out: 12:00  Total Appointment Time (timed & untimed codes): 60 minutes    Precautions:   Due ; having baby at [facility].   Gestational Diabetes.     Subjective     Date of onset: current pregnancy     History of current condition - Keara reports: she's always had the pain but it's been worse since being pregnant. She played sports in  and college - stopped playing soccer in  and that's when she noticed the pain. Back then it wasn't too bad but as she's gotten older she's noticed it more.     OB/GYN History: , vaginal delivery, and perineal laceration  Sexually active? Yes  Pain with vaginal exams, intercourse or tampon use? Occasional pain that's related to hard time finding a good position. A couple times she's had pelvic area pain afterwards but it's not consistent.     Pain:  Location: lower back - left side. Can radiate down the back of her leg to her foot but usually stops mid-hamstring   Current 2/10, worst 8/10, best 0/10   Description: shooting/radiating/sharp   Aggravating Factors/Activities that cause symptoms: too much activity, laying on her back for too long (supine), sometimes heavy lifting around the house.   Easing Factors: time/rest, sometimes if her  squeezes her hips.     Bladder/Bowel History:   Frequency of urination:  "  Daytime: about 3-5x/day - varies if she has coffee or not.            Nighttime: about 6x/night, patient states she's been drinking more water too   Difficulty initiating urine stream: No  Urine stream: strong  Complete emptying: Not since pregnancy   Bladder leakage: Yes  Frequency of incidents: variable   Amount leaked (urine): a couple drops if she coughs or sneezes. No urge urinary incontinence   Urinary Urgency: Yes  Frequency of bowel movements: once every 2-3 days   Difficulty initiating BM: Yes  Does Patient Feel Empty after BM? Yes  Fiber Supplements or Laxative Use? No  Colon leakage: No  Form of protection: panty liner  Number of pads required in 24 hours: 1       Medical History: Keara  has a past medical history of Allergic rhinitis with postnasal drip (05/27/2019), Anxiety, Chronic cough (04/12/2019), and Eczema (04/12/2019).     Surgical History: Keara Wood  has a past surgical history that includes gallblader removal; Cholecystectomy; Liposuction of abdomen; and Carpal tunnel release (Left, 02/02/2022).    Medications: Keara has a current medication list which includes the following prescription(s): acyclovir, blood sugar diagnostic, blood-glucose meter, lancets, multivitamin, fish oil-omega-3 fatty acids, and prenatal 25/iron fum/folic/dha.    Allergies:   Review of patient's allergies indicates:   Allergen Reactions    Penicillins Hives    Voltaren [diclofenac sodium]      Broke out in rash        Prior Therapy/Previous treatment included: none for this condition   Social History: lives with their family  Current exercise: "not as much as I should" - she may take a walk once/week. Wasn't really exercising before pregnancy either, about 6-8 months before pregnancy she was, but then life got busy.   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  Prior Level of Function: independent   Current Level of Function: see above       Pts goals: resolve pain     OBJECTIVE     See EMR under MEDIA for written consent provided 6/6/2023  Chaperone: declined    ORTHO SCREEN    Posture in standing: increased lordosis due to late stage pregnancy   Pelvic alignment: no sign of deviations noted in supine   SI Joint Palpation: Tenderness to the left SI joint palpation.  Sacral spring test: positive left (Positive=NO spring)  Stork standing test: decreased ability to transfer load with left single leg stance, no difficulty with right single leg stance   SLR test: pain/difficulty with left SLR - decreased pain with compression     Hip Strength:  Hip Left Right   Flexion 4-/5 4+/5   Abduction 4-/5 4/5   External Rot 4-/5 4/5   Internal Rot 4+/5 4+/5       VAGINAL PELVIC FLOOR EXAM - deferred today     Limitation/Restriction for FOTO Pelvic Pain Survey    Therapist reviewed FOTO scores for Keara Wood on 6/6/2023.   FOTO documents entered into [employer] - see Media section.    Limitation Score: 50%       TREATMENT     Treatment Time In: 11:30  Treatment Time Out: 12:00  Total Treatment time (time-based codes) separate from Evaluation: 30 minutes      Therapeutic Activity Patient participated in dynamic functional therapeutic activities to improve functional performance for 30 minutes. Including: Education as described below.       Patient Education provided:   general anatomy/physiology of urinary/ bowel  system and benefits of treatment were discussed with the pt. Additionally, anatomy/physiology of pelvic floor, posture/body mechanices, and behavior modifications were reviewed.     Home Exercises provided:  Written Home Exercises provided: yes.  Exercises were reviewed and Keara was able to demonstrate them prior to the end of the session.    Keara demonstrated good  understanding of the education  provided.     See EMR under Patient Instructions for exercises provided 6/6/2023.    Assessment     Keara is a 32 y.o. female referred to outpatient Physical Therapy with a medical diagnosis of 30 weeks gestation of pregnancy [Z3A.30], Pelvic pain in pregnancy [O26.899, R10.2]. Pt presents with altered posture, poor knowledge of body mechanics and posture, poor trunk stability, increased frequency of urination, increased nocturia, poor coordination of pelvic floor muscles during ADL's leading to urinary or fecal leakage, dysfunctional voiding, dysfunctional defecation, and decreased hip and core strengthening .      Pt prognosis is Good.   Pt will benefit from skilled outpatient Physical Therapy to address the deficits stated above and in the chart below, provide pt/family education, and to maximize pt's level of independence.     Plan of care discussed with patient: Yes  Pt's spiritual, cultural and educational needs considered and patient is agreeable to the plan of care and goals as stated below:     Anticipated Barriers for therapy: late-stage pregnancy with due date 7/3/2023; history of back pain prior to pregnancy.     Medical Necessity is demonstrated by evaluation stated above.     Clinical Presentation evolving clinical presentation with changing clinical characteristics moderate   Decision Making/ Complexity Score: moderate       Goals:  Short Term Goals: 2 weeks   - Pt will demonstrate excellent knowledge and adherence to HEP to facilitate optimal recovery.    Long Term Goals: 4 weeks   - Pt will demonstrate excellent knowledge and adherence to HEP for continued self-maintenance of symptoms.  - Pt will report FOTO score of 40% limited or less indicating clinically relevant increase in function.  - Pt will report waking 2x/night to void for improved quality of sleep.  - Pt will report ability to delay urinary urge for at least 15 minutes to maintain continence with ADL/IADLs.   - Pt will report ability  to perform ADL/IADL with no more than 4/10 pain at worst for improved activity tolerance.   - Pt will demonstrate hip strength increase by 1/2 grade or more for improved pelvic stability needed for functional activity.       Plan     Plan of care Certification: 6/6/2023 to 7/6/2023.    Outpatient Physical Therapy 1-2 times weekly for 4 weeks to include the following interventions: therapeutic exercises, therapeutic activity, neuromuscular re-education, manual therapy, modalities PRN, patient/family education, dry needling, and self care/home management    Sujatha Bal, PT

## 2023-06-06 NOTE — PROGRESS NOTES
"Chief Complaint   Patient presents with    Routine Prenatal Visit         32 y.o. female  at 36w1d, by Estimated Date of Delivery: 7/3/23    Complaints today: none . Doing well today. Occasional BH, nothing regular.   Reviewed TW lb    ROS  OBSTETRICS:   Contractions No   Bleeding No   Loss of fluid No   Fetal mvmnt Yes  GASTRO:   Nausea No   Vomiting No      OB History    Para Term  AB Living   2 1 1     1   SAB IAB Ectopic Multiple Live Births           1      # Outcome Date GA Lbr Edmond/2nd Weight Sex Delivery Anes PTL Lv   2 Current            1 Term 14 40w0d  2.92 kg (6 lb 7 oz) F Vag-Spont None N ISAAC       Dating reviewed  Allergies and problem list reviewed and updated  Medical and surgical history reviewed  Prenatal labs reviewed and updated    PHYSICAL EXAM  LMP 09/15/2022 (Approximate)    /70   Wt 86.4 kg (190 lb 9.4 oz)   LMP 09/15/2022 (Approximate)   BMI 28.98 kg/m²       GENERAL: No acute distress  HEENT: Normocephalic, atraumatic  NEURO: Alert and oriented x3  PSYCH: Normal mood and affect  PULMONARY: Non-labored respiration; no tachypnea  ABD: Soft, gravid, nontender.      ASSESSMENT AND PLAN     Problems (from 22 to present)     Problem Noted Resolved    HSV infection 2023 by Concepcion Martinez CNM No    Overview Signed 2023 12:31 PM by Concepcion Martinez CNM     DO NOT MENTION IN  FRONT OF PARTNER  36wk vantrex         Gestational diabetes mellitus (GDM) in third trimester 2023 by Angeles Morrow CNM No    Overview Addendum 2023 12:30 PM by Concepcion Martinez CNM     [x]32w growth - "Fetal size is AGA with the EFW at the 29% and the AC at the 31%. The EFW is 1710 g."  [x]MFM consult  [x]Dietician consult  [x]~36w growth - "Fetal size is AGA with the EFW at the 34% and the AC at the 68%. The EFW is 2634 g."     Delivery timing recommendations: Well controlled with diet: 39 0/7 - 40 6/7 weeks gestation         Pregnancy 3/8/2023 by Angeles ORTIZ" "CHRIS Morrow No    Overview Addendum 2023  9:11 AM by Concepcion Martinez CNM     Connected Mom: ordered/enrolled (not synching at 23w visit)  Prepregnancy BMI: 27   Pap: 2022 - NILM; HR HPV negative  Dating - per 7w sono  U/S - no anomalies/Incomplete [x]Repeat MFM sono - Normal anatomy  Aneuploidy screening -  Blood type: A POS.   GDM screen -   Vaccines -   [ ]Flu - declines  [ ]Covid-19 -declines  [ ]TDAP - declines  Contraception -   Peds - Peds hospitalist -   Circ - N/A  GBS  [ ]Consents                 GBS collected today   Reviewed consents. Will take home, review further, and sign next visit.   Reviewed BS logs. She did have some high values, states she was not eating right because she had baby shower and her "babymoon". Reinforced importance of good diet and good glycemic control.       Reviewed warning signs, normal FM,  labor precautions, and how/when to call.      Follow-up: 1 week, call or present sooner PRN    Concepcion Martinez CNM      "

## 2023-06-08 ENCOUNTER — PATIENT MESSAGE (OUTPATIENT)
Dept: OBSTETRICS AND GYNECOLOGY | Facility: CLINIC | Age: 33
End: 2023-06-08
Payer: COMMERCIAL

## 2023-06-08 DIAGNOSIS — B00.9 HSV INFECTION: Primary | ICD-10-CM

## 2023-06-08 RX ORDER — ACYCLOVIR 400 MG/1
400 TABLET ORAL 3 TIMES DAILY
Qty: 30 TABLET | Refills: 2 | Status: ON HOLD | OUTPATIENT
Start: 2023-06-08 | End: 2023-07-05

## 2023-06-08 NOTE — TELEPHONE ENCOUNTER
Verified pharmacy with pt. Cancelled rx and sent in new rx to pharmacy.   Advised that sex and swimming are still okay with loss of mucous plug, but if her water breaks then sex would not be recommended.   Discussed rationale and guideline for IOL by 40wks due to GDM. Pt states she would not want IOL, and prefers to await spontaneous labor.

## 2023-06-09 ENCOUNTER — PATIENT MESSAGE (OUTPATIENT)
Dept: OBSTETRICS AND GYNECOLOGY | Facility: CLINIC | Age: 33
End: 2023-06-09
Payer: COMMERCIAL

## 2023-06-09 DIAGNOSIS — B00.9 HERPES VIRUS INFECTION IN MOTHER DURING THIRD TRIMESTER OF PREGNANCY: Primary | ICD-10-CM

## 2023-06-09 DIAGNOSIS — O98.513 HERPES VIRUS INFECTION IN MOTHER DURING THIRD TRIMESTER OF PREGNANCY: Primary | ICD-10-CM

## 2023-06-09 LAB — BACTERIA SPEC AEROBE CULT: NORMAL

## 2023-06-15 ENCOUNTER — PATIENT MESSAGE (OUTPATIENT)
Dept: OBSTETRICS AND GYNECOLOGY | Facility: CLINIC | Age: 33
End: 2023-06-15

## 2023-06-15 ENCOUNTER — ROUTINE PRENATAL (OUTPATIENT)
Dept: OBSTETRICS AND GYNECOLOGY | Facility: CLINIC | Age: 33
End: 2023-06-15
Payer: COMMERCIAL

## 2023-06-15 VITALS
WEIGHT: 189.69 LBS | BODY MASS INDEX: 28.84 KG/M2 | DIASTOLIC BLOOD PRESSURE: 72 MMHG | SYSTOLIC BLOOD PRESSURE: 116 MMHG

## 2023-06-15 DIAGNOSIS — Z3A.37 37 WEEKS GESTATION OF PREGNANCY: Primary | ICD-10-CM

## 2023-06-15 DIAGNOSIS — O24.410 DIET CONTROLLED GESTATIONAL DIABETES MELLITUS (GDM) IN THIRD TRIMESTER: ICD-10-CM

## 2023-06-15 PROCEDURE — 99999 PR PBB SHADOW E&M-EST. PATIENT-LVL III: CPT | Mod: PBBFAC,,,

## 2023-06-15 PROCEDURE — 0502F PR SUBSEQUENT PRENATAL CARE: ICD-10-PCS | Mod: CPTII,S$GLB,,

## 2023-06-15 PROCEDURE — 99999 PR PBB SHADOW E&M-EST. PATIENT-LVL III: ICD-10-PCS | Mod: PBBFAC,,,

## 2023-06-15 PROCEDURE — 0502F SUBSEQUENT PRENATAL CARE: CPT | Mod: CPTII,S$GLB,,

## 2023-06-15 NOTE — PROGRESS NOTES
"Chief Complaint   Patient presents with    Routine Prenatal Visit       32 y.o. female  at 37w3d, by Estimated Date of Delivery: 7/3/23    Complaints today: none. Doing well today.  Reviewed TW lbs    ROS  OBSTETRICS:   Contractions: Nothing regular   Bleeding No   Loss of fluid No   Fetal mvmnt Yes  GASTRO:   Nausea No   Vomiting No      OB History    Para Term  AB Living   2 1 1     1   SAB IAB Ectopic Multiple Live Births           1      # Outcome Date GA Lbr Edmond/2nd Weight Sex Delivery Anes PTL Lv   2 Current            1 Term 14 40w0d  2.92 kg (6 lb 7 oz) F Vag-Spont None N ISAAC       Dating reviewed  Allergies and problem list reviewed and updated  Medical and surgical history reviewed  Prenatal labs reviewed and updated    PHYSICAL EXAM  /72   Wt 86 kg (189 lb 11.3 oz)   LMP 09/15/2022 (Approximate)   BMI 28.84 kg/m²     GENERAL: No acute distress  HEENT: Normocephalic, atraumatic  NEURO: Alert and oriented x3  PSYCH: Normal mood and affect  PULMONARY: Non-labored respiration; no tachypnea  ABD: Soft, gravid, nontender.      ASSESSMENT AND PLAN     Problems (from 22 to present)     Problem Noted Resolved    HSV infection 2023 by Concepcion Martinez CNM No    Overview Signed 2023 12:31 PM by Concepcion Martinez CNM     DO NOT MENTION IN  FRONT OF PARTNER  36wk vancruz         Gestational diabetes mellitus (GDM) in third trimester 2023 by Angeles Morrow CNM No    Overview Addendum 2023 12:30 PM by Concepcion Martinez CNM     [x]32w growth - "Fetal size is AGA with the EFW at the 29% and the AC at the 31%. The EFW is 1710 g."  [x]MFM consult  [x]Dietician consult  [x]~36w growth - "Fetal size is AGA with the EFW at the 34% and the AC at the 68%. The EFW is 2634 g."     Delivery timing recommendations: Well controlled with diet: 39 0/7 - 40 6/7 weeks gestation         Pregnancy 3/8/2023 by Angeles Morrow CNM No    Overview Addendum 2023  9:11 AM by " Concepcion Martinez CNM     Connected Mom: ordered/enrolled (not synching at 23w visit)  Prepregnancy BMI: 27   Pap: 02/2022 - NILM; HR HPV negative  Dating - per 7w sono  U/S - no anomalies/Incomplete [x]Repeat MFM sono - Normal anatomy  Aneuploidy screening -  Blood type: A POS.   GDM screen -   Vaccines -   [ ]Flu - declines  [ ]Covid-19 -declines  [ ]TDAP - declines  Contraception -   Peds - Peds hospitalist -   Circ - N/A  GBS  [ ]Consents               Discussed IOL due to GDM. Would prefer 7/10 at 0000. Order placed.   Delivery consents signed today  Reviewed GBS neg and no need for intrapartum abx  Reviewed repeat HIV/RPR  Education regarding labor precautions.    Reviewed warning signs, normal FM, and how/when to call.      Follow-up: 1 week, call or present sooner RAYSHAWN Galvez CNM

## 2023-06-22 ENCOUNTER — CLINICAL SUPPORT (OUTPATIENT)
Dept: DIABETES | Facility: CLINIC | Age: 33
End: 2023-06-22
Payer: COMMERCIAL

## 2023-06-22 ENCOUNTER — TELEPHONE (OUTPATIENT)
Dept: OBSTETRICS AND GYNECOLOGY | Facility: CLINIC | Age: 33
End: 2023-06-22

## 2023-06-22 ENCOUNTER — PATIENT MESSAGE (OUTPATIENT)
Dept: DIABETES | Facility: CLINIC | Age: 33
End: 2023-06-22
Payer: COMMERCIAL

## 2023-06-22 ENCOUNTER — ROUTINE PRENATAL (OUTPATIENT)
Dept: OBSTETRICS AND GYNECOLOGY | Facility: CLINIC | Age: 33
End: 2023-06-22
Payer: COMMERCIAL

## 2023-06-22 ENCOUNTER — HOSPITAL ENCOUNTER (OUTPATIENT)
Dept: PERINATAL CARE | Facility: OTHER | Age: 33
Discharge: HOME | End: 2023-06-22
Attending: ADVANCED PRACTICE MIDWIFE
Payer: COMMERCIAL

## 2023-06-22 ENCOUNTER — PATIENT OUTREACH (OUTPATIENT)
Dept: DIABETES | Facility: CLINIC | Age: 33
End: 2023-06-22
Payer: COMMERCIAL

## 2023-06-22 VITALS
DIASTOLIC BLOOD PRESSURE: 70 MMHG | SYSTOLIC BLOOD PRESSURE: 118 MMHG | BODY MASS INDEX: 29.63 KG/M2 | WEIGHT: 194.88 LBS

## 2023-06-22 DIAGNOSIS — Z3A.38 38 WEEKS GESTATION OF PREGNANCY: ICD-10-CM

## 2023-06-22 DIAGNOSIS — E13.649 UNCONTROLLED OTHER SPECIFIED DIABETES MELLITUS WITH HYPOGLYCEMIA WITHOUT COMA: ICD-10-CM

## 2023-06-22 DIAGNOSIS — O24.414 INSULIN CONTROLLED GESTATIONAL DIABETES MELLITUS (GDM) DURING PREGNANCY, ANTEPARTUM: Primary | ICD-10-CM

## 2023-06-22 DIAGNOSIS — O24.410 DIET CONTROLLED GESTATIONAL DIABETES MELLITUS (GDM) IN THIRD TRIMESTER: ICD-10-CM

## 2023-06-22 DIAGNOSIS — O36.8131 DECREASED FETAL MOVEMENT AFFECTING MANAGEMENT OF PREGNANCY IN THIRD TRIMESTER, FETUS 1 OF MULTIPLE GESTATION: Primary | ICD-10-CM

## 2023-06-22 DIAGNOSIS — O36.8131 DECREASED FETAL MOVEMENT AFFECTING MANAGEMENT OF PREGNANCY IN THIRD TRIMESTER, FETUS 1 OF MULTIPLE GESTATION: ICD-10-CM

## 2023-06-22 PROCEDURE — 0502F SUBSEQUENT PRENATAL CARE: CPT | Mod: CPTII,S$GLB,, | Performed by: ADVANCED PRACTICE MIDWIFE

## 2023-06-22 PROCEDURE — 0502F PR SUBSEQUENT PRENATAL CARE: ICD-10-PCS | Mod: CPTII,S$GLB,, | Performed by: ADVANCED PRACTICE MIDWIFE

## 2023-06-22 PROCEDURE — 76815 OB US LIMITED FETUS(S): CPT | Mod: 26,,, | Performed by: OBSTETRICS & GYNECOLOGY

## 2023-06-22 PROCEDURE — 59025 PRENATAL TESTING - NST/AFI: ICD-10-PCS | Mod: 26,,, | Performed by: OBSTETRICS & GYNECOLOGY

## 2023-06-22 PROCEDURE — 99999 PR PBB SHADOW E&M-EST. PATIENT-LVL III: ICD-10-PCS | Mod: PBBFAC,,, | Performed by: ADVANCED PRACTICE MIDWIFE

## 2023-06-22 PROCEDURE — 76815 PRENATAL TESTING - NST/AFI: ICD-10-PCS | Mod: 26,,, | Performed by: OBSTETRICS & GYNECOLOGY

## 2023-06-22 PROCEDURE — 76815 OB US LIMITED FETUS(S): CPT

## 2023-06-22 PROCEDURE — 59025 FETAL NON-STRESS TEST: CPT | Mod: 26,,, | Performed by: OBSTETRICS & GYNECOLOGY

## 2023-06-22 PROCEDURE — 59025 FETAL NON-STRESS TEST: CPT

## 2023-06-22 PROCEDURE — 99999 PR PBB SHADOW E&M-EST. PATIENT-LVL III: CPT | Mod: PBBFAC,,, | Performed by: ADVANCED PRACTICE MIDWIFE

## 2023-06-22 RX ORDER — NAPROXEN SODIUM 220 MG
1 TABLET ORAL NIGHTLY
Qty: 10 EACH | Refills: 1 | Status: ON HOLD | OUTPATIENT
Start: 2023-06-22 | End: 2023-07-05

## 2023-06-22 RX ORDER — HUMAN INSULIN 100 [IU]/ML
10 INJECTION, SUSPENSION SUBCUTANEOUS NIGHTLY
Qty: 10 ML | Refills: 1 | Status: ON HOLD | OUTPATIENT
Start: 2023-06-22 | End: 2023-07-05

## 2023-06-22 NOTE — PROGRESS NOTES
Diabetes Care Specialist Virtual Visit Note       The patient location is: [place]  The chief complaint leading to consultation is: Diabetes  Visit type: audiovisual  Total time spent with patient: 20 min   Each patient to whom he or she provides medical services by telemedicine is:  (1) informed of the relationship between the physician and patient and the respective role of any other health care provider with respect to management of the patient; and (2) notified that he or she may decline to receive medical services by telemedicine and may withdraw from such care at any time.     Diabetes Care Specialist Progress Note  Author: Kori Cardenas RD, CDE  Date: 6/22/2023    Program Intake  Reason for Diabetes Program Visit:: Intervention  Type of Intervention:: Individual  Individual: Medication Training  Medication Training: Vial and Syringe  Current diabetes risk level:: low  Permission to speak with others about care:: no    No results found for: LABA1C, HGBA1C      Diabetes Self-Management Skills Assessment    Medications  Patient is able to describe current diabetes management routine.: yes  Diabetes management routine:: diet, insulin (MFM starting NPH 10 units HS insulin tonight - plan to induce next week)  Patient is able to identify current diabetes medications, dosages, and appropriate timing of medications.: no  Patient understands the purpose of the medications taken for diabetes.: yes  Patient reports problems or concerns with current medication regimen.: yes  Medication regimen problems/concerns:: lack of training  Medication Skills Assessment Completed:: Yes  Assessment indicates:: Instruction Needed  Area of need?: Yes       Assessment Summary and Plan    Based on today's diabetes care assessment, the following areas of need were identified:      Social 5/3/2023   Support No   Access to Mass Media/Tech No   Cognitive/Behavioral Health No   Culture/Scientologist No   Communication No   Health Literacy No         Clinical 5/3/2023   Lab Compliance No   Nutritional Status No        Diabetes Self-Management Skills 6/22/2023   Diabetes Disease Process/Treatment Options -   Nutrition/Healthy Eating -   Physical Activity/Exercise -   Medication Yes - see care planning   Home Blood Glucose Monitoring -   Acute Complications -   Chronic Complications -   Psychosocial/Coping -          Today's interventions were provided through individual discussion, instruction, and written materials were provided.      Patient verbalized understanding of instruction and written materials.  Pt was able to return back demonstration of instructions today. Patient understood key points, needs reinforcement and further instruction.     Diabetes Self-Management Care Plan:    Today's Diabetes Self-Management Care Plan was developed with Keara's input. Keara has agreed to work toward the following goal(s) to improve his/her overall diabetes control.      Care Plan: Diabetes Management   Updates made since 5/23/2023 12:00 AM        Problem: Medications         Goal: Pt will start NPH 10 units HS tonight.    Start Date: 6/22/2023   Expected End Date: 7/22/2023   Priority: High   Barriers: Lack of Supplies   Note:    6/22/23 - Received call from [facility] nurse regarding pt needing insulin vial and syringe training to start taking NPH tonight. Starting NPH only 10 units HS. Provided training via virtual visit today.   Ed on type of insulin prescribed and how it works to control blood sugar  Reviewed prescription w/ pt and correct dosing/injection schedule  Ed on injection technique, site rotation, insulin storage and needle disposal and demonstrated with demo supplies.  Ed on safety considerations & increased risk of hypoglycemia - ed on s/s and how to treat & prevent    Ed on troubleshooting and when to call the clinic for out-of-range BG for dose adjustments          Task: Reviewed with patient all current diabetes medications and provided basic  review of the purpose, dosage, frequency, side effects, and storage of both oral and injectable diabetes medications. Completed 6/22/2023        Task: Discussed guidelines for preventing, detecting and treating hypoglycemia and hyperglycemia and reviewed the importance of meal and medication timing with diabetes mediations for prevention of hypoglycemia and maximum drug benefit. Completed 6/22/2023          Follow Up Plan     Follow up pt to call or send message with questions or when ready to schedule.    Today's care plan and follow up schedule was discussed with patient.  Keara verbalized understanding of the care plan, goals, and agrees to follow up plan.        The patient was encouraged to communicate with his/her health care provider/physician and care team regarding his/her condition(s) and treatment.  I provided the patient with my contact information today and encouraged to contact me via phone or [facility] Patient Portal as needed.     Length of Visit   Total Time: 20 Minutes - unable to drop charge for visit d/t length less than 30 min

## 2023-06-22 NOTE — PROGRESS NOTES
Will start insulin per MFM recs, will reach out to DM education [phone].  Plan for co-management for delivery

## 2023-06-22 NOTE — TELEPHONE ENCOUNTER
Per MFM, recommendation is to start insulin and plan for delivery at 39 weeks, with . Reviewed prescription and plan for IOL; co-management with MD team and CNMs on L&D.  Pt verb understanding.

## 2023-06-22 NOTE — PATIENT INSTRUCTIONS
Called pt back to discuss her Bg levels during pregnancy.  Pt states elevated levels (sent BG log via portal) and that OB/GYN recommending starting insulin.  Pt had questions about insulin; Educator answered concerns but also encouraged pt to contact MD to discuss further.

## 2023-06-22 NOTE — PROGRESS NOTES
Pregnancy dating, medications, labs, ultrasound reports, prenatal testing, and problem list; prior records and results; and available outside records were reviewed and updated in EMR.  In addition, past medical, surgical, social, family, and obstetric history were also reviewed and updated in EMR. Pertinent findings were noted below.    Reason for Visit   Routine Prenatal Visit    32 y.o.,  at 38w3d     Complaints today: complaining of decreased fetal movement today. Also notes that fasting glucose values have been elevated consistently in the last week. Logs reviewed and >40% fasting values greater than 95, with occ pp values also elevated.   TW lbs   Contractions: No   Bleeding: No   Loss of fluid: No   Nausea: No   Vomiting: No   Headache: No       Fetal Movement: Today complaining of decreased fetal movement     PHYSICAL EXAM  GENERAL: No acute distress  HEENT: Normocephalic, atraumatic  NEURO: Alert and oriented x3  PSYCH: Normal mood and affect  PULMONARY: Non-labored respiration; no tachypnea  ABD: Soft, gravid, nontender.      ASSESSMENT AND PLAN  Decreased fetal movement affecting management of pregnancy in third trimester, fetus 1 of multiple gestation  -     Prenatal Testing - NST/VÍCTOR; Standing    Diet controlled gestational diabetes mellitus (GDM) in third trimester  -     US MFM Procedure ([facility])-Future; Future       To prenatal testing for NST/VÍCTOR for decreased fetal movement   Reviewed blood glucose logs, have contacted MFM for advice with consistently elevated values    Discussed plans for support people during labor - patient plans to have spouse.    Reviewed warning signs, normal FM, and how/when to call.      Follow-up: 1 week, call or present sooner RAYSHAWN Goldstein CNM, APRN

## 2023-06-23 ENCOUNTER — PATIENT MESSAGE (OUTPATIENT)
Dept: OBSTETRICS AND GYNECOLOGY | Facility: CLINIC | Age: 33
End: 2023-06-23
Payer: COMMERCIAL

## 2023-06-23 ENCOUNTER — DOCUMENTATION ONLY (OUTPATIENT)
Dept: OBSTETRICS AND GYNECOLOGY | Facility: CLINIC | Age: 33
End: 2023-06-23
Payer: COMMERCIAL

## 2023-06-23 DIAGNOSIS — Z53.20 REFUSES TREATMENT: Primary | ICD-10-CM

## 2023-06-23 DIAGNOSIS — Z53.20 PATIENT DECLINES TO TAKE MEDICATION: ICD-10-CM

## 2023-06-23 DIAGNOSIS — E11.65 POORLY CONTROLLED DIABETES MELLITUS: ICD-10-CM

## 2023-06-23 DIAGNOSIS — O24.414 INSULIN CONTROLLED GESTATIONAL DIABETES MELLITUS (GDM) DURING PREGNANCY, ANTEPARTUM: ICD-10-CM

## 2023-06-23 DIAGNOSIS — O24.419 GESTATIONAL DIABETES MELLITUS (GDM) IN THIRD TRIMESTER, GESTATIONAL DIABETES METHOD OF CONTROL UNSPECIFIED: ICD-10-CM

## 2023-06-24 NOTE — PROGRESS NOTES
Patient called office during clinic hours and expressed to MA answering phone that she did not consent to induction and requested that appointment be canceled. MA relayed message to CHRIS.

## 2023-06-26 ENCOUNTER — PATIENT MESSAGE (OUTPATIENT)
Dept: OBSTETRICS AND GYNECOLOGY | Facility: CLINIC | Age: 33
End: 2023-06-26
Payer: COMMERCIAL

## 2023-06-26 ENCOUNTER — TELEPHONE (OUTPATIENT)
Dept: OBSTETRICS AND GYNECOLOGY | Facility: CLINIC | Age: 33
End: 2023-06-26
Payer: COMMERCIAL

## 2023-06-26 NOTE — TELEPHONE ENCOUNTER
"Spoke with patient regarding refusal of insulin, metformin, or IOL.   States that she doesn't feel like medication or induction is necessary because her blood sugars for the past 4 days were "better and in control". States that since her last appointment she has changed her diet and is eating less fruits.   Discussed [facility] recommendations for medication and induction, and that she may become too high risk to qualify for continued midwifery care.   Discussed risks of uncontrolled GDM and questions answered.   She desires another appointment with [facility] (message sent to [facility] staff to schedule).   She agrees to twice weekly PNT, order placed and will talk to M.A. to get scheduled.   "

## 2023-06-27 ENCOUNTER — HOSPITAL ENCOUNTER (OUTPATIENT)
Dept: PERINATAL CARE | Facility: OTHER | Age: 33
Discharge: HOME | End: 2023-06-27
Attending: ADVANCED PRACTICE MIDWIFE
Payer: COMMERCIAL

## 2023-06-27 DIAGNOSIS — O24.419 GESTATIONAL DIABETES MELLITUS (GDM) IN THIRD TRIMESTER, GESTATIONAL DIABETES METHOD OF CONTROL UNSPECIFIED: ICD-10-CM

## 2023-06-27 DIAGNOSIS — Z53.20 REFUSES TREATMENT: ICD-10-CM

## 2023-06-27 PROCEDURE — 59025 FETAL NON-STRESS TEST: CPT

## 2023-06-27 PROCEDURE — 59025 FETAL NON-STRESS TEST: CPT | Mod: 26,,, | Performed by: OBSTETRICS & GYNECOLOGY

## 2023-06-27 PROCEDURE — 59025 PRENATAL TESTING - NST/AFI: ICD-10-PCS | Mod: 26,,, | Performed by: OBSTETRICS & GYNECOLOGY

## 2023-06-28 ENCOUNTER — PATIENT MESSAGE (OUTPATIENT)
Dept: MATERNAL FETAL MEDICINE | Facility: CLINIC | Age: 33
End: 2023-06-28
Payer: COMMERCIAL

## 2023-06-29 ENCOUNTER — PROCEDURE VISIT (OUTPATIENT)
Dept: MATERNAL FETAL MEDICINE | Facility: CLINIC | Age: 33
End: 2023-06-29
Payer: COMMERCIAL

## 2023-06-29 ENCOUNTER — ROUTINE PRENATAL (OUTPATIENT)
Dept: OBSTETRICS AND GYNECOLOGY | Facility: CLINIC | Age: 33
End: 2023-06-29
Payer: COMMERCIAL

## 2023-06-29 VITALS
DIASTOLIC BLOOD PRESSURE: 74 MMHG | SYSTOLIC BLOOD PRESSURE: 118 MMHG | WEIGHT: 194.75 LBS | BODY MASS INDEX: 29.62 KG/M2

## 2023-06-29 DIAGNOSIS — Z3A.39 39 WEEKS GESTATION OF PREGNANCY: Primary | ICD-10-CM

## 2023-06-29 DIAGNOSIS — O24.410 DIET CONTROLLED GESTATIONAL DIABETES MELLITUS (GDM) IN THIRD TRIMESTER: ICD-10-CM

## 2023-06-29 PROCEDURE — 0502F PR SUBSEQUENT PRENATAL CARE: ICD-10-PCS | Mod: CPTII,S$GLB,,

## 2023-06-29 PROCEDURE — 76816 US MFM PROCEDURE (VIEWPOINT): ICD-10-PCS | Mod: S$GLB,,, | Performed by: OBSTETRICS & GYNECOLOGY

## 2023-06-29 PROCEDURE — 99999 PR PBB SHADOW E&M-EST. PATIENT-LVL II: CPT | Mod: PBBFAC,,,

## 2023-06-29 PROCEDURE — 76816 OB US FOLLOW-UP PER FETUS: CPT | Mod: S$GLB,,, | Performed by: OBSTETRICS & GYNECOLOGY

## 2023-06-29 PROCEDURE — 99999 PR PBB SHADOW E&M-EST. PATIENT-LVL II: ICD-10-PCS | Mod: PBBFAC,,,

## 2023-06-29 PROCEDURE — 0502F SUBSEQUENT PRENATAL CARE: CPT | Mod: CPTII,S$GLB,,

## 2023-06-29 NOTE — PROGRESS NOTES
"Chief Complaint   Patient presents with    Routine Prenatal Visit       32 y.o. female  at 39w3d, by Estimated Date of Delivery: 7/3/23    Complaints today: none. Doing well today.  Reviewed TW lbs    ROS  OBSTETRICS:   Contractions: Nothing regular   Bleeding No   Loss of fluid No   Fetal mvmnt Yes  GASTRO:   Nausea No   Vomiting No      OB History    Para Term  AB Living   2 1 1     1   SAB IAB Ectopic Multiple Live Births           1      # Outcome Date GA Lbr Edmond/2nd Weight Sex Delivery Anes PTL Lv   2 Current            1 Term 14 40w0d  2.92 kg (6 lb 7 oz) F Vag-Spont None N ISAAC       Dating reviewed  Allergies and problem list reviewed and updated  Medical and surgical history reviewed  Prenatal labs reviewed and updated    PHYSICAL EXAM  /74   Wt 88.3 kg (194 lb 12.4 oz)   LMP 09/15/2022 (Approximate)   BMI 29.62 kg/m²     GENERAL: No acute distress  HEENT: Normocephalic, atraumatic  NEURO: Alert and oriented x3  PSYCH: Normal mood and affect  PULMONARY: Non-labored respiration; no tachypnea  ABD: Soft, gravid, nontender.      ASSESSMENT AND PLAN     Problems (from 22 to present)     Problem Noted Resolved    HSV infection 2023 by Concepcion Martinez CNM No    Overview Signed 2023 12:31 PM by Concepcion Martinez CNM     DO NOT MENTION IN  FRONT OF PARTNER  36wk vantrex         Gestational diabetes mellitus (GDM) in third trimester 2023 by Angeles Morrow CNM No    Overview Addendum 2023 12:30 PM by Concepcion Martinez CNM     [x]32w growth - "Fetal size is AGA with the EFW at the 29% and the AC at the 31%. The EFW is 1710 g."  [x]MFM consult  [x]Dietician consult  [x]~36w growth - "Fetal size is AGA with the EFW at the 34% and the AC at the 68%. The EFW is 2634 g."     Delivery timing recommendations: Well controlled with diet: 39 0/7 - 40 6/7 weeks gestation         Pregnancy 3/8/2023 by Angeles Morrow CNM No    Overview Addendum 2023  9:11 AM " by Concepcion Martinez CNM     Connected Mom: ordered/enrolled (not synching at 23w visit)  Prepregnancy BMI: 27   Pap: 02/2022 - NILM; HR HPV negative  Dating - per 7w sono  U/S - no anomalies/Incomplete [x]Repeat MFM sono - Normal anatomy  Aneuploidy screening -  Blood type: A POS.   GDM screen -   Vaccines -   [ ]Flu - declines  [ ]Covid-19 -declines  [ ]TDAP - declines  Contraception -   Peds - Peds hospitalist -   Circ - N/A  GBS  [ ]Consents               Has MFM appointment immediately after this visit.   Delivery consents already signed  Discussed plans for support people during labor - she plans to have mother and  and a .    Reviewed warning signs, normal FM, and how/when to call.      Follow-up: 1 week, call or present sooner RAYSHAWN Galvez CNM

## 2023-07-03 ENCOUNTER — PATIENT MESSAGE (OUTPATIENT)
Dept: OBSTETRICS AND GYNECOLOGY | Facility: CLINIC | Age: 33
End: 2023-07-03

## 2023-07-03 ENCOUNTER — ROUTINE PRENATAL (OUTPATIENT)
Dept: OBSTETRICS AND GYNECOLOGY | Facility: CLINIC | Age: 33
End: 2023-07-03
Payer: COMMERCIAL

## 2023-07-03 ENCOUNTER — HOSPITAL ENCOUNTER (OUTPATIENT)
Dept: PERINATAL CARE | Facility: OTHER | Age: 33
Discharge: HOME | End: 2023-07-03
Attending: ADVANCED PRACTICE MIDWIFE
Payer: COMMERCIAL

## 2023-07-03 VITALS
BODY MASS INDEX: 29.28 KG/M2 | SYSTOLIC BLOOD PRESSURE: 122 MMHG | WEIGHT: 192.56 LBS | DIASTOLIC BLOOD PRESSURE: 80 MMHG

## 2023-07-03 DIAGNOSIS — Z3A.40 40 WEEKS GESTATION OF PREGNANCY: ICD-10-CM

## 2023-07-03 DIAGNOSIS — O24.410 DIET CONTROLLED GESTATIONAL DIABETES MELLITUS (GDM) IN THIRD TRIMESTER: Primary | ICD-10-CM

## 2023-07-03 DIAGNOSIS — Z53.20 REFUSES TREATMENT: ICD-10-CM

## 2023-07-03 DIAGNOSIS — O24.419 GESTATIONAL DIABETES MELLITUS (GDM) IN THIRD TRIMESTER, GESTATIONAL DIABETES METHOD OF CONTROL UNSPECIFIED: ICD-10-CM

## 2023-07-03 PROCEDURE — 76815 OB US LIMITED FETUS(S): CPT

## 2023-07-03 PROCEDURE — 76815 OB US LIMITED FETUS(S): CPT | Mod: 26,,, | Performed by: OBSTETRICS & GYNECOLOGY

## 2023-07-03 PROCEDURE — 0502F SUBSEQUENT PRENATAL CARE: CPT | Mod: CPTII,S$GLB,,

## 2023-07-03 PROCEDURE — 59025 FETAL NON-STRESS TEST: CPT | Mod: 26,,, | Performed by: OBSTETRICS & GYNECOLOGY

## 2023-07-03 PROCEDURE — 99999 PR PBB SHADOW E&M-EST. PATIENT-LVL III: CPT | Mod: PBBFAC,,,

## 2023-07-03 PROCEDURE — 76815 PRENATAL TESTING - NST/AFI: ICD-10-PCS | Mod: 26,,, | Performed by: OBSTETRICS & GYNECOLOGY

## 2023-07-03 PROCEDURE — 59025 PRENATAL TESTING - NST/AFI: ICD-10-PCS | Mod: 26,,, | Performed by: OBSTETRICS & GYNECOLOGY

## 2023-07-03 PROCEDURE — 0502F PR SUBSEQUENT PRENATAL CARE: ICD-10-PCS | Mod: CPTII,S$GLB,,

## 2023-07-03 PROCEDURE — 59025 FETAL NON-STRESS TEST: CPT

## 2023-07-03 PROCEDURE — 99999 PR PBB SHADOW E&M-EST. PATIENT-LVL III: ICD-10-PCS | Mod: PBBFAC,,,

## 2023-07-03 NOTE — PROGRESS NOTES
"  32 y.o. female  at 40w0d, by Estimated Date of Delivery: 7/3/23    Complaints today: none. Doing well today. Ready for baby. Borderline fluid today, wants membrane sweep to speed up labor. Membranes swept.  Reviewed TW lbs    ROS  OBSTETRICS:   Contractions: Nothing regular   Bleeding No   Loss of fluid No   Fetal mvmnt yes  GASTRO:   Nausea No   Vomiting No      OB History    Para Term  AB Living   2 1 1     1   SAB IAB Ectopic Multiple Live Births           1      # Outcome Date GA Lbr Edmond/2nd Weight Sex Delivery Anes PTL Lv   2 Current            1 Term 14 40w0d  2.92 kg (6 lb 7 oz) F Vag-Spont None N ISAAC       Dating reviewed  Allergies and problem list reviewed and updated  Medical and surgical history reviewed  Prenatal labs reviewed and updated    PHYSICAL EXAM  /80   Wt 87.4 kg (192 lb 9.2 oz)   LMP 09/15/2022 (Approximate)   BMI 29.28 kg/m²     GENERAL: No acute distress  HEENT: Normocephalic, atraumatic  NEURO: Alert and oriented x3  PSYCH: Normal mood and affect  PULMONARY: Non-labored respiration; no tachypnea  ABD: Soft, gravid, nontender.      ASSESSMENT AND PLAN     Problems (from 22 to present)     Problem Noted Resolved    HSV infection 2023 by Concepcion Martinez CNM No    Overview Signed 2023 12:31 PM by Concepcion Martinez CNM     DO NOT MENTION IN  FRONT OF PARTNER  36wk vantrex         Gestational diabetes mellitus (GDM) in third trimester 2023 by Angeles Morrow CNM No    Overview Addendum 2023 12:30 PM by Concepcion Martinez CNM     [x]32w growth - "Fetal size is AGA with the EFW at the 29% and the AC at the 31%. The EFW is 1710 g."  [x]MFM consult  [x]Dietician consult  [x]~36w growth - "Fetal size is AGA with the EFW at the 34% and the AC at the 68%. The EFW is 2634 g."     Delivery timing recommendations: Well controlled with diet: 39 0/7 - 40 6/7 weeks gestation         Pregnancy 3/8/2023 by Angeles Morrow CNM No    Overview " Addendum 4/4/2023  9:11 AM by Concepcion Martinez CNM     Connected Mom: ordered/enrolled (not synching at 23w visit)  Prepregnancy BMI: 27   Pap: 02/2022 - NILM; HR HPV negative  Dating - per 7w sono  U/S - no anomalies/Incomplete [x]Repeat MFM sono - Normal anatomy  Aneuploidy screening -  Blood type: A POS.   GDM screen -   Vaccines -   [ ]Flu - declines  [ ]Covid-19 -declines  [ ]TDAP - declines  Contraception -   Peds - Peds hospitalist -   Circ - N/A  GBS  [ ]Consents                 Delivery consents signed.  Discussed postdates management and IOL - patient prefers to await spontaneous labor if possible / desires IOL at 41w 0d.  PNT/VÍCTOR for GDM.    Reviewed warning signs, normal FM, and how/when to call.      Follow-up: 1 week, call or present sooner PRN

## 2023-07-04 ENCOUNTER — TELEPHONE (OUTPATIENT)
Dept: OBSTETRICS AND GYNECOLOGY | Facility: HOSPITAL | Age: 33
End: 2023-07-04
Payer: COMMERCIAL

## 2023-07-04 ENCOUNTER — TELEPHONE (OUTPATIENT)
Dept: OBSTETRICS AND GYNECOLOGY | Facility: OTHER | Age: 33
End: 2023-07-04
Payer: COMMERCIAL

## 2023-07-04 ENCOUNTER — HOSPITAL ENCOUNTER (INPATIENT)
Facility: OTHER | Age: 33
LOS: 1 days | Discharge: HOME | End: 2023-07-05
Attending: OBSTETRICS & GYNECOLOGY | Admitting: OBSTETRICS & GYNECOLOGY
Payer: COMMERCIAL

## 2023-07-04 DIAGNOSIS — Z3A.40 40 WEEKS GESTATION OF PREGNANCY: ICD-10-CM

## 2023-07-04 DIAGNOSIS — O24.410 DIET CONTROLLED GESTATIONAL DIABETES MELLITUS (GDM) IN THIRD TRIMESTER: ICD-10-CM

## 2023-07-04 LAB
ABO + RH BLD: NORMAL
BASOPHILS # BLD AUTO: 0.02 K/UL (ref 0–0.2)
BASOPHILS NFR BLD: 0.2 % (ref 0–1.9)
BLD GP AB SCN CELLS X3 SERPL QL: NORMAL
DIFFERENTIAL METHOD: ABNORMAL
EOSINOPHIL # BLD AUTO: 0 K/UL (ref 0–0.5)
EOSINOPHIL NFR BLD: 0.3 % (ref 0–8)
ERYTHROCYTE [DISTWIDTH] IN BLOOD BY AUTOMATED COUNT: 14.6 % (ref 11.5–14.5)
HCT VFR BLD AUTO: 40.5 % (ref 37–48.5)
HGB BLD-MCNC: 13.4 G/DL (ref 12–16)
HIV1+2 IGG SERPL QL IA.RAPID: NORMAL
IMM GRANULOCYTES # BLD AUTO: 0.06 K/UL (ref 0–0.04)
IMM GRANULOCYTES NFR BLD AUTO: 0.5 % (ref 0–0.5)
LYMPHOCYTES # BLD AUTO: 1.6 K/UL (ref 1–4.8)
LYMPHOCYTES NFR BLD: 12.1 % (ref 18–48)
MCH RBC QN AUTO: 28.6 PG (ref 27–31)
MCHC RBC AUTO-ENTMCNC: 33.1 G/DL (ref 32–36)
MCV RBC AUTO: 86 FL (ref 82–98)
MONOCYTES # BLD AUTO: 0.9 K/UL (ref 0.3–1)
MONOCYTES NFR BLD: 6.9 % (ref 4–15)
NEUTROPHILS # BLD AUTO: 10.4 K/UL (ref 1.8–7.7)
NEUTROPHILS NFR BLD: 80 % (ref 38–73)
NRBC BLD-RTO: 0 /100 WBC
PLATELET # BLD AUTO: 248 K/UL (ref 150–450)
PMV BLD AUTO: 10.9 FL (ref 9.2–12.9)
RBC # BLD AUTO: 4.69 M/UL (ref 4–5.4)
SPECIMEN OUTDATE: NORMAL
WBC # BLD AUTO: 12.98 K/UL (ref 3.9–12.7)

## 2023-07-04 PROCEDURE — 0502F PR SUBSEQUENT PRENATAL CARE: ICD-10-PCS | Mod: ,,,

## 2023-07-04 PROCEDURE — 25000003 PHARM REV CODE 250: Performed by: ADVANCED PRACTICE MIDWIFE

## 2023-07-04 PROCEDURE — 86592 SYPHILIS TEST NON-TREP QUAL: CPT | Performed by: ADVANCED PRACTICE MIDWIFE

## 2023-07-04 PROCEDURE — 36415 COLL VENOUS BLD VENIPUNCTURE: CPT | Performed by: ADVANCED PRACTICE MIDWIFE

## 2023-07-04 PROCEDURE — 72200004 HC VAGINAL DELIVERY LEVEL I

## 2023-07-04 PROCEDURE — 0502F SUBSEQUENT PRENATAL CARE: CPT | Mod: ,,,

## 2023-07-04 PROCEDURE — 11000001 HC ACUTE MED/SURG PRIVATE ROOM

## 2023-07-04 PROCEDURE — 59400 OBSTETRICAL CARE: CPT | Mod: GB,,, | Performed by: ADVANCED PRACTICE MIDWIFE

## 2023-07-04 PROCEDURE — 85025 COMPLETE CBC W/AUTO DIFF WBC: CPT | Performed by: ADVANCED PRACTICE MIDWIFE

## 2023-07-04 PROCEDURE — 99285 EMERGENCY DEPT VISIT HI MDM: CPT

## 2023-07-04 PROCEDURE — 72100002 HC LABOR CARE, 1ST 8 HOURS

## 2023-07-04 PROCEDURE — 63600175 PHARM REV CODE 636 W HCPCS: Performed by: ADVANCED PRACTICE MIDWIFE

## 2023-07-04 PROCEDURE — 63600175 PHARM REV CODE 636 W HCPCS

## 2023-07-04 PROCEDURE — 86703 HIV-1/HIV-2 1 RESULT ANTBDY: CPT | Performed by: ADVANCED PRACTICE MIDWIFE

## 2023-07-04 PROCEDURE — 59400 PR FULL ROUT OBSTE CARE,VAGINAL DELIV: ICD-10-PCS | Mod: GB,,, | Performed by: ADVANCED PRACTICE MIDWIFE

## 2023-07-04 PROCEDURE — 25000003 PHARM REV CODE 250

## 2023-07-04 PROCEDURE — 86900 BLOOD TYPING SEROLOGIC ABO: CPT | Performed by: ADVANCED PRACTICE MIDWIFE

## 2023-07-04 RX ORDER — ACETAMINOPHEN 325 MG/1
975 TABLET ORAL EVERY 8 HOURS
Status: DISPENSED | OUTPATIENT
Start: 2023-07-04 | End: 2023-07-05

## 2023-07-04 RX ORDER — OXYTOCIN 10 [USP'U]/ML
10 INJECTION, SOLUTION INTRAMUSCULAR; INTRAVENOUS ONCE AS NEEDED
Status: DISCONTINUED | OUTPATIENT
Start: 2023-07-04 | End: 2023-07-05 | Stop reason: HOSPADM

## 2023-07-04 RX ORDER — LIDOCAINE HYDROCHLORIDE 10 MG/ML
INJECTION INFILTRATION; PERINEURAL
Status: COMPLETED
Start: 2023-07-04 | End: 2023-07-04

## 2023-07-04 RX ORDER — DIPHENHYDRAMINE HCL 25 MG
25 CAPSULE ORAL EVERY 4 HOURS PRN
Status: DISCONTINUED | OUTPATIENT
Start: 2023-07-04 | End: 2023-07-05 | Stop reason: HOSPADM

## 2023-07-04 RX ORDER — PRENATAL WITH FERROUS FUM AND FOLIC ACID 3080; 920; 120; 400; 22; 1.84; 3; 20; 10; 1; 12; 200; 27; 25; 2 [IU]/1; [IU]/1; MG/1; [IU]/1; MG/1; MG/1; MG/1; MG/1; MG/1; MG/1; UG/1; MG/1; MG/1; MG/1; MG/1
1 TABLET ORAL DAILY
Status: DISCONTINUED | OUTPATIENT
Start: 2023-07-04 | End: 2023-07-05 | Stop reason: HOSPADM

## 2023-07-04 RX ORDER — OXYTOCIN/RINGER'S LACTATE 30/500 ML
95 PLASTIC BAG, INJECTION (ML) INTRAVENOUS ONCE AS NEEDED
Status: COMPLETED | OUTPATIENT
Start: 2023-07-04 | End: 2023-07-04

## 2023-07-04 RX ORDER — METHYLERGONOVINE MALEATE 0.2 MG/ML
200 INJECTION INTRAVENOUS
Status: DISCONTINUED | OUTPATIENT
Start: 2023-07-04 | End: 2023-07-05 | Stop reason: HOSPADM

## 2023-07-04 RX ORDER — IBUPROFEN 600 MG/1
600 TABLET ORAL EVERY 6 HOURS PRN
Status: DISCONTINUED | OUTPATIENT
Start: 2023-07-04 | End: 2023-07-05 | Stop reason: HOSPADM

## 2023-07-04 RX ORDER — OXYTOCIN/RINGER'S LACTATE 30/500 ML
PLASTIC BAG, INJECTION (ML) INTRAVENOUS
Status: COMPLETED
Start: 2023-07-04 | End: 2023-07-04

## 2023-07-04 RX ORDER — CARBOPROST TROMETHAMINE 250 UG/ML
250 INJECTION, SOLUTION INTRAMUSCULAR
Status: DISCONTINUED | OUTPATIENT
Start: 2023-07-04 | End: 2023-07-05 | Stop reason: HOSPADM

## 2023-07-04 RX ORDER — ONDANSETRON 8 MG/1
8 TABLET, ORALLY DISINTEGRATING ORAL EVERY 8 HOURS PRN
Status: DISCONTINUED | OUTPATIENT
Start: 2023-07-04 | End: 2023-07-05 | Stop reason: HOSPADM

## 2023-07-04 RX ORDER — MISOPROSTOL 200 UG/1
800 TABLET ORAL ONCE AS NEEDED
Status: DISCONTINUED | OUTPATIENT
Start: 2023-07-04 | End: 2023-07-05 | Stop reason: HOSPADM

## 2023-07-04 RX ORDER — OXYTOCIN/RINGER'S LACTATE 30/500 ML
334 PLASTIC BAG, INJECTION (ML) INTRAVENOUS ONCE AS NEEDED
Status: COMPLETED | OUTPATIENT
Start: 2023-07-04 | End: 2023-07-04

## 2023-07-04 RX ORDER — TRANEXAMIC ACID 10 MG/ML
1000 INJECTION, SOLUTION INTRAVENOUS ONCE AS NEEDED
Status: DISCONTINUED | OUTPATIENT
Start: 2023-07-04 | End: 2023-07-05 | Stop reason: HOSPADM

## 2023-07-04 RX ORDER — DOCUSATE SODIUM 100 MG/1
200 CAPSULE, LIQUID FILLED ORAL 2 TIMES DAILY PRN
Status: DISCONTINUED | OUTPATIENT
Start: 2023-07-04 | End: 2023-07-05 | Stop reason: HOSPADM

## 2023-07-04 RX ORDER — ACETAMINOPHEN 325 MG/1
650 TABLET ORAL EVERY 6 HOURS PRN
Status: DISCONTINUED | OUTPATIENT
Start: 2023-07-04 | End: 2023-07-05 | Stop reason: HOSPADM

## 2023-07-04 RX ORDER — OXYTOCIN/RINGER'S LACTATE 30/500 ML
95 PLASTIC BAG, INJECTION (ML) INTRAVENOUS ONCE
Status: DISCONTINUED | OUTPATIENT
Start: 2023-07-04 | End: 2023-07-05 | Stop reason: HOSPADM

## 2023-07-04 RX ORDER — SIMETHICONE 80 MG
1 TABLET,CHEWABLE ORAL EVERY 6 HOURS PRN
Status: DISCONTINUED | OUTPATIENT
Start: 2023-07-04 | End: 2023-07-05 | Stop reason: HOSPADM

## 2023-07-04 RX ORDER — HYDROCORTISONE 25 MG/G
CREAM TOPICAL 3 TIMES DAILY PRN
Status: DISCONTINUED | OUTPATIENT
Start: 2023-07-04 | End: 2023-07-05 | Stop reason: HOSPADM

## 2023-07-04 RX ORDER — SODIUM CHLORIDE 0.9 % (FLUSH) 0.9 %
2 SYRINGE (ML) INJECTION EVERY 12 HOURS
Status: DISCONTINUED | OUTPATIENT
Start: 2023-07-04 | End: 2023-07-05 | Stop reason: HOSPADM

## 2023-07-04 RX ORDER — PROCHLORPERAZINE EDISYLATE 5 MG/ML
5 INJECTION INTRAMUSCULAR; INTRAVENOUS EVERY 6 HOURS PRN
Status: DISCONTINUED | OUTPATIENT
Start: 2023-07-04 | End: 2023-07-05 | Stop reason: HOSPADM

## 2023-07-04 RX ORDER — DIPHENHYDRAMINE HYDROCHLORIDE 50 MG/ML
25 INJECTION INTRAMUSCULAR; INTRAVENOUS EVERY 4 HOURS PRN
Status: DISCONTINUED | OUTPATIENT
Start: 2023-07-04 | End: 2023-07-05 | Stop reason: HOSPADM

## 2023-07-04 RX ADMIN — Medication 95 MILLI-UNITS/MIN: at 04:07

## 2023-07-04 RX ADMIN — PRENATAL VIT W/ FE FUMARATE-FA TAB 27-0.8 MG 1 TABLET: 27-0.8 TAB at 08:07

## 2023-07-04 RX ADMIN — Medication 334 MILLI-UNITS/MIN: at 04:07

## 2023-07-04 RX ADMIN — LIDOCAINE HYDROCHLORIDE 200 MG: 10 INJECTION, SOLUTION INFILTRATION; PERINEURAL at 04:07

## 2023-07-04 RX ADMIN — DOCUSATE SODIUM 200 MG: 100 CAPSULE, LIQUID FILLED ORAL at 09:07

## 2023-07-04 RX ADMIN — DOCUSATE SODIUM 200 MG: 100 CAPSULE, LIQUID FILLED ORAL at 08:07

## 2023-07-04 NOTE — SUBJECTIVE & OBJECTIVE
"Obstetric HPI:  Patient delivered precipitously in [facility]; transferred to L&D for delivery of placenta and transition    This pregnancy has been complicated by   Patient Active Problem List   Diagnosis    Chronic cough    Eczema    Allergic rhinitis with postnasal drip    Carpal tunnel syndrome, bilateral    Pregnancy    Gestational diabetes mellitus (GDM) in third trimester    HSV infection         OB History    Para Term  AB Living   2 1 1 0 0 1   SAB IAB Ectopic Multiple Live Births   0 0 0 0 1      # Outcome Date GA Lbr Edmond/2nd Weight Sex Delivery Anes PTL Lv   2 Current            1 Term 14 40w0d  2.92 kg (6 lb 7 oz) F Vag-Spont None N ISAAC      Name: Ashley     Past Medical History:   Diagnosis Date    Allergic rhinitis with postnasal drip 2019    Anxiety     Chronic cough 2019    Eczema 2019     Past Surgical History:   Procedure Laterality Date    CARPAL TUNNEL RELEASE Left 2022    Procedure: RELEASE, CARPAL TUNNEL;  Surgeon: Bipin Newman II, MD;  Location: [facility];  Service: Orthopedics;  Laterality: Left;    CHOLECYSTECTOMY      gallblader removal      around age 19    LIPOSUCTION OF ABDOMEN             PTA Medications   Medication Sig    acyclovir (ZOVIRAX) 400 MG tablet Take 1 tablet (400 mg total) by mouth 3 (three) times daily.    blood sugar diagnostic Strp 1 strip by Misc.(Non-Drug; Combo Route) route 4 (four) times daily.    blood-glucose meter Misc 1 Device by Misc.(Non-Drug; Combo Route) route 4 (four) times daily.    insulin NPH (NOVOLIN N NPH U-100 INSULIN) 100 unit/mL injection Inject 10 Units into the skin every evening. (Patient not taking: Reported on 2023)    insulin syringe-needle U-100 0.5 mL 31 gauge x 5/16" Syrg 1 each by Misc.(Non-Drug; Combo Route) route every evening. (Patient not taking: Reported on 2023)    lancets 32 gauge Misc 1 lancet by Misc.(Non-Drug; Combo Route) route 4 (four) times daily.    multivitamin capsule Take " 1 capsule by mouth once daily.    omega-3 fatty acids/fish oil (FISH OIL-OMEGA-3 FATTY ACIDS) 300-1,000 mg capsule Take 1 capsule by mouth once daily.    prenatal 25/iron fum/folic/dha (PRENATAL-1 ORAL)        Review of patient's allergies indicates:   Allergen Reactions    Penicillins Hives    Voltaren [diclofenac sodium]      Broke out in rash        Family History       Problem Relation (Age of Onset)    Cancer Father, Maternal Grandfather    Colon cancer Paternal Aunt    Diabetes Father    Glaucoma Maternal Grandfather    Heart disease Father, Paternal Grandmother, Paternal Grandfather    Hypertension Mother    Ovarian cancer Maternal Grandmother    Prostate cancer Father          Tobacco Use    Smoking status: Never     Passive exposure: Never    Smokeless tobacco: Never   Substance and Sexual Activity    Alcohol use: Not Currently     Alcohol/week: 1.0 standard drink     Types: 1 Glasses of wine per week     Comment: 1-2x/week    Drug use: No    Sexual activity: Yes     Partners: Male     Birth control/protection: None     Comment: Rosas     Review of Systems   Constitutional: Negative.    HENT: Negative.     Eyes: Negative.    Respiratory: Negative.     Cardiovascular: Negative.    Gastrointestinal: Negative.  Abdominal pain: contractions.   Endocrine: Negative.    Genitourinary:  Positive for vaginal bleeding. Pelvic pain: contractions.  Musculoskeletal: Negative.    Integumentary:  Nipple discharge: lactating. Negative.   Neurological: Negative.    Hematological: Negative.    Psychiatric/Behavioral: Negative.     Breast: negative.  Nipple discharge: lactating.   Objective:     Vital Signs (Most Recent):  Temp: 98.1 °F (36.7 °C) (07/04/23 0433)  Pulse: 93 (07/04/23 0435)  Resp: 18 (07/04/23 0433)  BP: 125/60 (07/04/23 0433)  SpO2: 99 % (07/04/23 0435) Vital Signs (24h Range):  Temp:  [98.1 °F (36.7 °C)] 98.1 °F (36.7 °C)  Pulse:  [] 93  Resp:  [18] 18  SpO2:  [96 %-99 %] 99 %  BP: (122-125)/(60-80)  125/60        There is no height or weight on file to calculate BMI.         Physical Exam:   Constitutional: She is oriented to person, place, and time. She appears well-developed and well-nourished.    HENT:   Head: Normocephalic and atraumatic.    Eyes: Pupils are equal, round, and reactive to light. EOM are normal.     Cardiovascular:  Normal rate and regular rhythm.             Pulmonary/Chest: Effort normal.        Abdominal: Distension: appropriate for immediate PP; fundus firm after delivery of placenta.     Genitourinary:    Genitourinary Comments: Careful inspection of vagina at time of perineal repair revealed no HSV lesions             Musculoskeletal: Normal range of motion.       Neurological: She is alert and oriented to person, place, and time.    Skin: Skin is warm and dry.    Psychiatric: She has a normal mood and affect.    Placenta noted to be in vaginal vault. Delivered without difficulty using gentle traction. Fundus firm.       Significant Labs:  Lab Results   Component Value Date    GROUPTRH A POS 07/04/2023    HEPBSAG Non-reactive 11/17/2022    STREPBCULT No Group B Streptococcus isolated 06/06/2023       I have personallly reviewed all pertinent lab results from the last 24 hours.

## 2023-07-04 NOTE — PLAN OF CARE
VSS. Patient ambulating and voiding independently and without difficulty. Fundus firm without massage, midline, with moderate rubra noted. Pain controlled without PRN pain medications. Safety maintained, bed low and in a locked position. Significant other at bedside. Breastfeeding every 2-3 hours with moderate assistance and in response to infant cues. No further concerns at this time, will continue to monitor.

## 2023-07-04 NOTE — HPI
History of Present Illness:   Keara Wood is a 32 y.o. female  at 40w1d with Estimated Date of Delivery: 7/3/23 based on first trimester ultrasound who presents to Labor and Delivery accompanied by  Rosas. Expecting a girl!    Upon arrival to the [facility], Keara complained of painful contractions and urge to push. Pt consented to SVE and was found to be 8/90/-1. Within minutes, she delivered precipitously in the [facility]. See delivery summary for details. A Code D was called; and mother and baby were found to be stable and transported to L&D for delivery of placenta.    This pregnancy has been complicated by   Patient Active Problem List   Diagnosis    Chronic cough    Eczema    Allergic rhinitis with postnasal drip    Carpal tunnel syndrome, bilateral    Pregnancy    Gestational diabetes mellitus (GDM) in third trimester    HSV infection        Presentation: cepahlic  Estimated Fetal Weight: EFW per MFM ultrasound 23 7lbs 1 oz    Birth Center Risk Assessment: 1-management on labor and Delivery    CNM management in ABC  CNM management on L&D  Consultation with OB to develop  plan of care  Collaborative CNM/OB management with delivery on L&D   4- Permanent referral of care to MD

## 2023-07-04 NOTE — TELEPHONE ENCOUNTER
Patient's  states that he feels that wife is going into labor. Patient agrees that she feels ready to come to hospital soon, reports painful contractions every few minutes.     Instructed to report to [facility] on 6th floor.  Pt verbalized understanding and intent to report to [facility].

## 2023-07-04 NOTE — H&P
[facility] - Labor & Delivery  Obstetrics  History & Physical    Patient Name: Keara Wood  MRN: [id number]  Admission Date: 2023  Primary Care Provider: Amaya Fish MD    Subjective:     Principal Problem:<principal problem not specified>    History of Present Illness:  History of Present Illness:   Keara Wood is a 32 y.o. female  at 40w1d with Estimated Date of Delivery: 7/3/23 based on first trimester ultrasound who presents to Labor and Delivery accompanied by  Rosas. Expecting a girl!    Upon arrival to the [facility], Keara complained of painful contractions and urge to push. Pt consented to SVE and was found to be 8/90/-1. Within minutes, she delivered precipitously in the [facility]. See delivery summary for details. A Code D was called; and mother and baby were found to be stable and transported to L&D for delivery of placenta.    This pregnancy has been complicated by   Patient Active Problem List   Diagnosis    Chronic cough    Eczema    Allergic rhinitis with postnasal drip    Carpal tunnel syndrome, bilateral    Pregnancy    Gestational diabetes mellitus (GDM) in third trimester    HSV infection        Presentation: cepahlic  Estimated Fetal Weight: EFW per [facility] ultrasound 23 7lbs 1 oz    Birth Center Risk Assessment: 1-management on labor and Delivery    0- CNM management in ABC  1- CNM management on L&D  2- Consultation with OB to develop  plan of care  3- Collaborative CNM/OB management with delivery on L&D   4- Permanent referral of care to MD        Obstetric HPI:  Patient delivered precipitously in [facility]; transferred to L&D for delivery of placenta and transition    This pregnancy has been complicated by   Patient Active Problem List   Diagnosis    Chronic cough    Eczema    Allergic rhinitis with postnasal drip    Carpal tunnel syndrome, bilateral    Pregnancy    Gestational diabetes mellitus (GDM) in third trimester    HSV infection         OB History     "Para Term  AB Living   2 1 1 0 0 1   SAB IAB Ectopic Multiple Live Births   0 0 0 0 1      # Outcome Date GA Lbr Edmond/2nd Weight Sex Delivery Anes PTL Lv   2 Current            1 Term 14 40w0d  2.92 kg (6 lb 7 oz) F Vag-Spont None N ISAAC      Name: Ashley     Past Medical History:   Diagnosis Date    Allergic rhinitis with postnasal drip 2019    Anxiety     Chronic cough 2019    Eczema 2019     Past Surgical History:   Procedure Laterality Date    CARPAL TUNNEL RELEASE Left 2022    Procedure: RELEASE, CARPAL TUNNEL;  Surgeon: Bipin Newman II, MD;  Location: [facility];  Service: Orthopedics;  Laterality: Left;    CHOLECYSTECTOMY      gallblader removal      around age 19    LIPOSUCTION OF ABDOMEN             PTA Medications   Medication Sig    acyclovir (ZOVIRAX) 400 MG tablet Take 1 tablet (400 mg total) by mouth 3 (three) times daily.    blood sugar diagnostic Strp 1 strip by Misc.(Non-Drug; Combo Route) route 4 (four) times daily.    blood-glucose meter Misc 1 Device by Misc.(Non-Drug; Combo Route) route 4 (four) times daily.    insulin NPH (NOVOLIN N NPH U-100 INSULIN) 100 unit/mL injection Inject 10 Units into the skin every evening. (Patient not taking: Reported on 2023)    insulin syringe-needle U-100 0.5 mL 31 gauge x 5/16" Syrg 1 each by Misc.(Non-Drug; Combo Route) route every evening. (Patient not taking: Reported on 2023)    lancets 32 gauge Misc 1 lancet by Misc.(Non-Drug; Combo Route) route 4 (four) times daily.    multivitamin capsule Take 1 capsule by mouth once daily.    omega-3 fatty acids/fish oil (FISH OIL-OMEGA-3 FATTY ACIDS) 300-1,000 mg capsule Take 1 capsule by mouth once daily.    prenatal 25/iron fum/folic/dha (PRENATAL-1 ORAL)        Review of patient's allergies indicates:   Allergen Reactions    Penicillins Hives    Voltaren [diclofenac sodium]      Broke out in rash        Family History       Problem Relation (Age of " Onset)    Cancer Father, Maternal Grandfather    Colon cancer Paternal Aunt    Diabetes Father    Glaucoma Maternal Grandfather    Heart disease Father, Paternal Grandmother, Paternal Grandfather    Hypertension Mother    Ovarian cancer Maternal Grandmother    Prostate cancer Father          Tobacco Use    Smoking status: Never     Passive exposure: Never    Smokeless tobacco: Never   Substance and Sexual Activity    Alcohol use: Not Currently     Alcohol/week: 1.0 standard drink     Types: 1 Glasses of wine per week     Comment: 1-2x/week    Drug use: No    Sexual activity: Yes     Partners: Male     Birth control/protection: None     Comment: Rosas     Review of Systems   Constitutional: Negative.    HENT: Negative.     Eyes: Negative.    Respiratory: Negative.     Cardiovascular: Negative.    Gastrointestinal: Negative.  Abdominal pain: contractions.   Endocrine: Negative.    Genitourinary:  Positive for vaginal bleeding. Pelvic pain: contractions.  Musculoskeletal: Negative.    Integumentary:  Nipple discharge: lactating. Negative.   Neurological: Negative.    Hematological: Negative.    Psychiatric/Behavioral: Negative.     Breast: negative.  Nipple discharge: lactating.   Objective:     Vital Signs (Most Recent):  Temp: 98.1 °F (36.7 °C) (07/04/23 0433)  Pulse: 93 (07/04/23 0435)  Resp: 18 (07/04/23 0433)  BP: 125/60 (07/04/23 0433)  SpO2: 99 % (07/04/23 0435) Vital Signs (24h Range):  Temp:  [98.1 °F (36.7 °C)] 98.1 °F (36.7 °C)  Pulse:  [] 93  Resp:  [18] 18  SpO2:  [96 %-99 %] 99 %  BP: (122-125)/(60-80) 125/60        There is no height or weight on file to calculate BMI.         Physical Exam:   Constitutional: She is oriented to person, place, and time. She appears well-developed and well-nourished.    HENT:   Head: Normocephalic and atraumatic.    Eyes: Pupils are equal, round, and reactive to light. EOM are normal.     Cardiovascular:  Normal rate and regular rhythm.              Pulmonary/Chest: Effort normal.        Abdominal: Distension: appropriate for immediate PP; fundus firm after delivery of placenta.     Genitourinary:    Genitourinary Comments: Careful inspection of vagina at time of perineal repair revealed no HSV lesions             Musculoskeletal: Normal range of motion.       Neurological: She is alert and oriented to person, place, and time.    Skin: Skin is warm and dry.    Psychiatric: She has a normal mood and affect.    Placenta noted to be in vaginal vault. Delivered without difficulty using gentle traction. Fundus firm.       Significant Labs:  Lab Results   Component Value Date    GROUPTRH A POS 2023    HEPBSAG Non-reactive 2022    STREPBCULT No Group B Streptococcus isolated 2023       I have personallly reviewed all pertinent lab results from the last 24 hours.    Assessment/Plan:     32 y.o. female  at 40w1d for:    No notes have been filed under this hospital service.  Service: Obstetrics and Gynecology      Ailyn Goldstien CNM  Obstetrics  [facility] - Labor & Delivery

## 2023-07-04 NOTE — L&D DELIVERY NOTE
[facility] - Labor & Delivery  Vaginal Delivery   Operative Note    SUMMARY     Patient presented to [facility] complaining of contractions; she consented to SVE and was found to be 8/90/-1; within a few minutes there was a   Precipitous  vaginal delivery of live infant, was placed on mothers abdomen for skin to skin and bulb suctioning performed.  Infant delivered position OA over intact perineum.  Nuchal cord: No.    Upon transfer to L&D;   Spontaneous delivery of placenta and IV pitocin given noting good uterine tone.  1st degree laceration noted.  Patient tolerated delivery well. Sponge needle and lap counted correctly x2.    Indications: <principal problem not specified>  Pregnancy complicated by:   Patient Active Problem List   Diagnosis    Chronic cough    Eczema    Allergic rhinitis with postnasal drip    Carpal tunnel syndrome, bilateral    Pregnancy    Gestational diabetes mellitus (GDM) in third trimester    HSV infection    Precipitous delivery, delivered (current hospitalization)     Admitting GA: 40w1d    Delivery Information for Viviana Wood    Birth information:  YOB: 2024   Time of birth: 3:56 AM   Sex: female   Head Delivery Date/Time: 7/4/2023  3:56 AM   Delivery type: Vaginal, Spontaneous   Gestational Age: 40w1d        Delivery Providers    Delivering clinician: Ailyn Goldstein CNM   Provider Role    Lima Quinonez RN Registered Nurse    Mercedes Molina RN Registered Nurse    Adenike Avila RN Registered Nurse    Jojo Fowler, RAFFAELE Charge Nurse              Measurements    Weight:   Length:          Apgars    Living status: Living  Apgars:  1 min.:  5 min.:  10 min.:  15 min.:  20 min.:    Skin color:  0  1       Heart rate:  2  2       Reflex irritability:  2  2       Muscle tone:  2  2       Respiratory effort:  2  2       Total:  8  9       Apgars assigned by: ANGEL NARVAEZ RN         Operative Delivery    Forceps attempted?: No  Vacuum extractor attempted?: No          Shoulder Dystocia    Shoulder dystocia present?: No                 Interventions/Resuscitation    Method: Bulb Suctioning, Tactile Stimulation       Cord    Vessels: 3 vessels  Complications: None  Delayed Cord Clamping?: Yes  Cord Clamped Date/Time: 2023  4:08 AM  Gases Sent?: No  Stem Cell Collection (by MD): No       Placenta    Placenta delivery date/time: 2023 0414  Placenta removal: Expressed  Placenta appearance: Intact  Placenta disposition: Family           Labor Events:       labor: No     Labor Onset Date/Time:         Dilation Complete Date/Time:         Start Pushing Date/Time:         Start Pushing Date/Time:       Rupture Date/Time:            Rupture type:          Fluid Amount:       Fluid Color:                steroids: None     Antibiotics given for GBS:       Induction:       Indications for induction:        Augmentation:       Indications for augmentation:       Labor complications: None     Additional complications:          Cervical ripening:                     Delivery:      Episiotomy: None     Indication for Episiotomy:       Perineal Lacerations: 1st Repaired:  Yes   Periurethral Laceration:   Repaired:     Labial Laceration:   Repaired:     Sulcus Laceration:   Repaired:     Vaginal Laceration:   Repaired:     Cervical Laceration:   Repaired:     Repair suture:       Repair # of packets: 1     Last Value - EBL - Nursing (mL):       Sum - EBL - Nursing (mL): 0     Last Value - EBL - Anesthesia (mL):      Calculated QBL (mL): 250      Vaginal Sweep Performed: Yes     Surgicount Correct:       Vaginal Packing: No Quantity:       Other providers:       Anesthesia    Method: None          Details (if applicable):  Trial of Labor      Categorization:      Priority:     Indications for :     Incision Type:       Additional  information:  Forceps:    Vacuum:    Breech:    Observed anomalies    Other (Comments):

## 2023-07-04 NOTE — LACTATION NOTE
07/04/23 1116   Maternal Assessment   Breast Shape Bilateral:;round   Breast Density Bilateral:;soft   Areola Bilateral:;elastic   Nipples Bilateral:;everted   Maternal Infant Feeding   Maternal Emotional State assist needed   Infant Positioning clutch/football   Signs of Milk Transfer audible swallow;infant jaw motion present   Latch Assistance yes  (minimal)     Assisted pt with position and latch. Baby able to latch to breast in football position. Good tugs and pulls observed. Basic breastfeeding education provided. Questions answered. Skin to skin encouraged.

## 2023-07-04 NOTE — ED PROVIDER NOTES
Encounter Date: 2023       History     Chief Complaint   Patient presents with    Contractions     History of Present Illness:   Keara Wood is a 32 y.o. female  at 40w1d with Estimated Date of Delivery: 7/3/23 based on first trimester ultrasound who presents to Labor and Delivery accompanied by  Rosas. Expecting a girl!    Reports regular uterine contractions since 11 pm. They are now 1-2 minutes apart and increasing in intensity and duration.  strong contractions, active fetal movement, No vaginal bleeding , No loss of fluid.     Last ate lunch at 1400, last drank water at bedside.     This pregnancy has been complicated by   Patient Active Problem List:     Chronic cough     Eczema     Allergic rhinitis with postnasal drip     Carpal tunnel syndrome, bilateral     Pregnancy     Gestational diabetes mellitus (GDM) in third trimester - diet controlled     HSV infection - on valcyclovir       Presentation: cephalic  Estimated Fetal Weight: EFW on growth ultrasound per  MFM on 23 7lbs 1 oz    Birth Center Risk Assessment: 1-management on labor and Delivery    CNM management in ABC  CNM management on L&D  Consultation with OB to develop  plan of care  Collaborative CNM/OB management with delivery on L&D   4- Permanent referral of care to MD        Review of patient's allergies indicates:   Allergen Reactions    Penicillins Hives    Voltaren [diclofenac sodium]      Broke out in rash     Past Medical History:   Diagnosis Date    Allergic rhinitis with postnasal drip 2019    Anxiety     Chronic cough 2019    Eczema 2019     Past Surgical History:   Procedure Laterality Date    CARPAL TUNNEL RELEASE Left 2022    Procedure: RELEASE, CARPAL TUNNEL;  Surgeon: Bipin Newman II, MD;  Location: [facility];  Service: Orthopedics;  Laterality: Left;    CHOLECYSTECTOMY      gallblader removal      around age 19    LIPOSUCTION OF ABDOMEN      2017     Family History   Problem  Relation Age of Onset    Hypertension Mother     Diabetes Father     Heart disease Father     Prostate cancer Father     Cancer Father         prostate    Ovarian cancer Maternal Grandmother         mid- to late-60s    Cancer Maternal Grandfather         Throat cancer - smoker    Glaucoma Maternal Grandfather     Heart disease Paternal Grandmother     Heart disease Paternal Grandfather     Colon cancer Paternal Aunt     Macular degeneration Neg Hx     Thyroid disease Neg Hx     Breast cancer Neg Hx      Social History     Tobacco Use    Smoking status: Never     Passive exposure: Never    Smokeless tobacco: Never   Substance Use Topics    Alcohol use: Not Currently     Alcohol/week: 1.0 standard drink     Types: 1 Glasses of wine per week     Comment: 1-2x/week    Drug use: No     Review of Systems   Constitutional: Negative.    HENT: Negative.     Eyes: Negative.    Respiratory: Negative.     Cardiovascular: Negative.    Gastrointestinal:  Abdominal distention: gravid. Abdominal pain: contractions.   Endocrine: Negative.    Genitourinary: Negative.    Musculoskeletal: Negative.    Skin: Negative.    Allergic/Immunologic: Negative.    Neurological: Negative.    Hematological: Negative.    Psychiatric/Behavioral: Negative.       Physical Exam     Initial Vitals   BP Pulse Resp Temp SpO2   07/04/23 0433 07/04/23 0354 07/04/23 0433 07/04/23 0433 07/04/23 0354   125/60 103 18 98.1 °F (36.7 °C) 96 %      MAP       --                Physical Exam    Nursing note and vitals reviewed.  Constitutional: She appears well-developed and well-nourished.   HENT:   Head: Normocephalic and atraumatic.   Eyes: EOM are normal. Pupils are equal, round, and reactive to light.   Neck: Neck supple.   Normal range of motion.  Cardiovascular:  Normal rate.           Abdominal: Distension: gravid. Tenderness: contractions.   Genitourinary:    Vagina normal.      Genitourinary Comments: Unable to complete SSE before patient delivered  precipitously in RAD[facility]  No lesions noted on careful inspection during repair of first degree perineal laceration.     Musculoskeletal:         General: Normal range of motion.      Cervical back: Normal range of motion and neck supple.     Neurological: She is alert and oriented to person, place, and time. She has normal strength.   Skin: Skin is warm and dry.   Psychiatric: She has a normal mood and affect. Thought content normal.     Pt consents to SVE; found to be 8/90/-1    Less than 5 minutes after SVE patient reported that baby's head was out - live female infant born in OA position in RAD[facility] and placed skin-to-skin with mother. Code D was called to [facility].     ED Course   Procedures  Labs Reviewed - No data to display       Imaging Results    None          Medications   acetaminophen tablet 975 mg (975 mg Oral Not Given 7/4/23 2200)   LIDOcaine HCL 10 mg/ml (1%) 10 mg/mL (1 %) injection (200 mg  Given by Other 7/4/23 0430)   oxytocin 30 units in 500 mL lactated ringers infusion (non-titrating) (334 paul-units/min Intravenous New Bag 7/4/23 0408)   oxytocin 30 units in 500 mL lactated ringers infusion (non-titrating) (95 paul-units/min Intravenous New Bag 7/4/23 0447)     Medical Decision Making:   Initial Assessment:   Precipitous labor and birth  Differential Diagnosis:   N/A  Clinical Tests:   Lab Tests: Ordered  ED Management:  Code D called; once patient assessed to be stable, transferred to L&D for delivery of placenta  Other:   I have discussed this case with another health care provider.       <> Summary of the Discussion: ED attending notified of precipitous delivery, present for evaluation and transfer to L&D          Attending Attestation:   Physician Attestation Statement for Resident:  As the supervising MD   Physician Attestation Statement: I have personally seen and examined this patient.   I agree with the above history.  -:   As the supervising MD I agree with the above PE.     As the supervising  MD I agree with the above treatment, course, plan, and disposition.   -:  in [facility] of vigorous infant.   Patient transferred to L&D for delivery of the placenta.  I was personally present during the critical portions of the procedure(s) performed by the resident and was immediately available in the ED to provide services and assistance as needed during the entire procedure.   I have reviewed the following: old records at this facility.                           Clinical Impression:   31 yo  with GDM diet controlled and history of HSV on valtrex with precipitous delivery in RAD[facility]     ED Disposition Condition    Admit                 Ailyn Goldstein CNM  [phone]       Lyndsay Bee MD  [phone]

## 2023-07-05 VITALS
DIASTOLIC BLOOD PRESSURE: 62 MMHG | OXYGEN SATURATION: 97 % | SYSTOLIC BLOOD PRESSURE: 103 MMHG | HEART RATE: 99 BPM | TEMPERATURE: 98 F | RESPIRATION RATE: 18 BRPM

## 2023-07-05 LAB
BASOPHILS # BLD AUTO: 0.03 K/UL (ref 0–0.2)
BASOPHILS NFR BLD: 0.3 % (ref 0–1.9)
DIFFERENTIAL METHOD: ABNORMAL
EOSINOPHIL # BLD AUTO: 0.1 K/UL (ref 0–0.5)
EOSINOPHIL NFR BLD: 1 % (ref 0–8)
ERYTHROCYTE [DISTWIDTH] IN BLOOD BY AUTOMATED COUNT: 14.8 % (ref 11.5–14.5)
HCT VFR BLD AUTO: 37.5 % (ref 37–48.5)
HGB BLD-MCNC: 12.5 G/DL (ref 12–16)
IMM GRANULOCYTES # BLD AUTO: 0.03 K/UL (ref 0–0.04)
IMM GRANULOCYTES NFR BLD AUTO: 0.3 % (ref 0–0.5)
LYMPHOCYTES # BLD AUTO: 2.6 K/UL (ref 1–4.8)
LYMPHOCYTES NFR BLD: 25.9 % (ref 18–48)
MCH RBC QN AUTO: 29.1 PG (ref 27–31)
MCHC RBC AUTO-ENTMCNC: 33.3 G/DL (ref 32–36)
MCV RBC AUTO: 87 FL (ref 82–98)
MONOCYTES # BLD AUTO: 0.9 K/UL (ref 0.3–1)
MONOCYTES NFR BLD: 9.1 % (ref 4–15)
NEUTROPHILS # BLD AUTO: 6.4 K/UL (ref 1.8–7.7)
NEUTROPHILS NFR BLD: 63.4 % (ref 38–73)
NRBC BLD-RTO: 0 /100 WBC
PLATELET # BLD AUTO: 245 K/UL (ref 150–450)
PMV BLD AUTO: 10 FL (ref 9.2–12.9)
RBC # BLD AUTO: 4.29 M/UL (ref 4–5.4)
RPR SER QL: NORMAL
WBC # BLD AUTO: 10.15 K/UL (ref 3.9–12.7)

## 2023-07-05 PROCEDURE — 25000003 PHARM REV CODE 250: Performed by: ADVANCED PRACTICE MIDWIFE

## 2023-07-05 PROCEDURE — 36415 COLL VENOUS BLD VENIPUNCTURE: CPT | Performed by: ADVANCED PRACTICE MIDWIFE

## 2023-07-05 PROCEDURE — 85025 COMPLETE CBC W/AUTO DIFF WBC: CPT | Performed by: ADVANCED PRACTICE MIDWIFE

## 2023-07-05 RX ORDER — IBUPROFEN 600 MG/1
600 TABLET ORAL EVERY 6 HOURS PRN
Qty: 30 TABLET | Refills: 0 | Status: SHIPPED | OUTPATIENT
Start: 2023-07-05

## 2023-07-05 RX ADMIN — PRENATAL VIT W/ FE FUMARATE-FA TAB 27-0.8 MG 1 TABLET: 27-0.8 TAB at 08:07

## 2023-07-05 RX ADMIN — IBUPROFEN 600 MG: 600 TABLET, FILM COATED ORAL at 08:07

## 2023-07-05 RX ADMIN — IBUPROFEN 600 MG: 600 TABLET, FILM COATED ORAL at 03:07

## 2023-07-05 RX ADMIN — DOCUSATE SODIUM 200 MG: 100 CAPSULE, LIQUID FILLED ORAL at 08:07

## 2023-07-05 NOTE — PLAN OF CARE
VSS. Pain well controlled with scheduled medication. Pt ambulating independently and voiding without difficulty. Fundus midline, firm, with light lochia. Patient safety maintained, side rails up, bed low and locked position. Significant other at bedside; parents responding to infant cues and bonding appropriately.

## 2023-07-05 NOTE — ASSESSMENT & PLAN NOTE
7/5/2023 PPD 1 -- doing well, normal involution, normal lochia, pain controlled with PO meds. Breastfeeding going well. Desires discharge home today.

## 2023-07-05 NOTE — PROGRESS NOTES
[facility])  Obstetrics  Postpartum Progress Note    Patient Name: Keara Wood  MRN: [id number]  Admission Date: 7/4/2023  Hospital Length of Stay: 1 days  Attending Physician: Mariana Ngo MD  Primary Care Provider: Amaya Fish MD    Subjective:     Principal Problem:<principal problem not specified>    Hospital Course:  0340 pt presented to [facility] with painful contractions  0350 pt consented to SVE and was found to be 8/90/-1  0356 pt delivered live female infant precipitously in RAD[facility] with CNM at bedside  0405 pt transferred to L&D    7/5/2023 PPD 1 -- doing well, normal involution, normal lochia, pain controlled with PO meds. Breastfeeding going well. Desires discharge home today.       Interval History: PPD1    She is doing well this morning. She is tolerating a regular diet without nausea or vomiting. She is voiding spontaneously. She is ambulating. She has passed flatus, and has a BM. Vaginal bleeding is mild. She denies fever or chills. Abdominal pain is mild and controlled with oral medications. She Is breastfeeding. She desires circumcision for her male baby: not applicable.    Objective:     Vital Signs (Most Recent):  Temp: 98.1 °F (36.7 °C) (07/05/23 0848)  Pulse: 99 (07/05/23 0848)  Resp: 18 (07/05/23 0848)  BP: 103/62 (07/05/23 0848)  SpO2: 97 % (07/05/23 0848) Vital Signs (24h Range):  Temp:  [97.5 °F (36.4 °C)-98.5 °F (36.9 °C)] 98.1 °F (36.7 °C)  Pulse:  [90-99] 99  Resp:  [18] 18  SpO2:  [95 %-99 %] 97 %  BP: (103-109)/(55-62) 103/62        There is no height or weight on file to calculate BMI.    No intake or output data in the 24 hours ending 07/05/23 1020      Significant Labs:  Lab Results   Component Value Date    GROUPTRH A POS 07/04/2023    HEPBSAG Non-reactive 11/17/2022    STREPBCULT No Group B Streptococcus isolated 06/06/2023     Recent Labs   Lab 07/05/23  0616   HGB 12.5   HCT 37.5       I have personallly reviewed all pertinent lab results from the last 24  hours.    Physical Exam:   Constitutional: She is oriented to person, place, and time. She appears well-developed and well-nourished.    HENT:   Head: Normocephalic and atraumatic.    Eyes: Conjunctivae are normal.     Cardiovascular:  Normal rate.             Pulmonary/Chest: Effort normal.        Abdominal: Soft. There is no abdominal tenderness.     Genitourinary: There is vaginal discharge (lochia rubra) in the vagina.           Musculoskeletal: Normal range of motion.       Neurological: She is alert and oriented to person, place, and time.    Skin: Skin is warm and dry.    Psychiatric: She has a normal mood and affect. Her behavior is normal. Judgment and thought content normal.     Review of Systems   Constitutional:  Negative for chills and fever.   HENT: Negative.     Eyes: Negative.  Negative for visual disturbance.   Respiratory: Negative.     Cardiovascular: Negative.    Gastrointestinal:  Positive for abdominal pain (cramping). Negative for nausea and vomiting.   Endocrine: Negative.    Genitourinary:  Positive for vaginal bleeding (lochia rubra).   Musculoskeletal: Negative.    Integumentary:  Negative.   Neurological: Negative.  Negative for headaches.   Hematological: Negative.    Psychiatric/Behavioral: Negative.     Breast: negative.      Assessment/Plan:     32 y.o. female  for:    Precipitous delivery, delivered (current hospitalization)  2023 PPD 1 -- doing well, normal involution, normal lochia, pain controlled with PO meds. Breastfeeding going well. Desires discharge home today.     Gestational diabetes mellitus (GDM) in third trimester  Fasting sugar not collected this morning  Plan for 2 hour GTT postpartum      Disposition: As patient meets milestones, will plan to discharge today.    Luba Galvez CNM  Obstetrics  [facility] ([facility])

## 2023-07-05 NOTE — SUBJECTIVE & OBJECTIVE
Interval History: PPD1    She is doing well this morning. She is tolerating a regular diet without nausea or vomiting. She is voiding spontaneously. She is ambulating. She has passed flatus, and has a BM. Vaginal bleeding is mild. She denies fever or chills. Abdominal pain is mild and controlled with oral medications. She Is breastfeeding. She desires circumcision for her male baby: not applicable.    Objective:     Vital Signs (Most Recent):  Temp: 98.1 °F (36.7 °C) (07/05/23 0848)  Pulse: 99 (07/05/23 0848)  Resp: 18 (07/05/23 0848)  BP: 103/62 (07/05/23 0848)  SpO2: 97 % (07/05/23 0848) Vital Signs (24h Range):  Temp:  [97.5 °F (36.4 °C)-98.5 °F (36.9 °C)] 98.1 °F (36.7 °C)  Pulse:  [90-99] 99  Resp:  [18] 18  SpO2:  [95 %-99 %] 97 %  BP: (103-109)/(55-62) 103/62        There is no height or weight on file to calculate BMI.    No intake or output data in the 24 hours ending 07/05/23 1020      Significant Labs:  Lab Results   Component Value Date    GROUPTRH A POS 07/04/2023    HEPBSAG Non-reactive 11/17/2022    STREPBCULT No Group B Streptococcus isolated 06/06/2023     Recent Labs   Lab 07/05/23  0616   HGB 12.5   HCT 37.5       I have personallly reviewed all pertinent lab results from the last 24 hours.    Physical Exam:   Constitutional: She is oriented to person, place, and time. She appears well-developed and well-nourished.    HENT:   Head: Normocephalic and atraumatic.    Eyes: Conjunctivae are normal.     Cardiovascular:  Normal rate.             Pulmonary/Chest: Effort normal.        Abdominal: Soft. There is no abdominal tenderness.     Genitourinary: There is vaginal discharge (lochia rubra) in the vagina.           Musculoskeletal: Normal range of motion.       Neurological: She is alert and oriented to person, place, and time.    Skin: Skin is warm and dry.    Psychiatric: She has a normal mood and affect. Her behavior is normal. Judgment and thought content normal.     Review of Systems    Constitutional:  Negative for chills and fever.   HENT: Negative.     Eyes: Negative.  Negative for visual disturbance.   Respiratory: Negative.     Cardiovascular: Negative.    Gastrointestinal:  Positive for abdominal pain (cramping). Negative for nausea and vomiting.   Endocrine: Negative.    Genitourinary:  Positive for vaginal bleeding (lochia rubra).   Musculoskeletal: Negative.    Integumentary:  Negative.   Neurological: Negative.  Negative for headaches.   Hematological: Negative.    Psychiatric/Behavioral: Negative.     Breast: negative.

## 2023-07-05 NOTE — PLAN OF CARE
Patient in no apparent distress. VSS. Ambulating without difficulty. No needs at this time. Mother Baby care guide reviewed. All questions answered. Pt educated to visit OB in 6 weeks for postpartum follow-up appointment.

## 2023-07-05 NOTE — DISCHARGE SUMMARY
[facility] ([place])  Obstetrics  Discharge Summary      Patient Name: Keara Wood  MRN: [id number]  Admission Date: 2023  Hospital Length of Stay: 1 days  Discharge Date and Time:  2023 10:28 AM  Attending Physician: Mariana Ngo MD   Discharging Provider: Luba Galvez CNM   Primary Care Provider: Amaya Fish MD    HPI:   History of Present Illness:   Keara Wood is a 32 y.o. female  at 40w1d with Estimated Date of Delivery: 7/3/23 based on first trimester ultrasound who presents to Labor and Delivery accompanied by  Rosas. Expecting a girl!    Upon arrival to the [facility], Keara complained of painful contractions and urge to push. Pt consented to SVE and was found to be 8/90/-1. Within minutes, she delivered precipitously in the RAD[facility]. See delivery summary for details. A Code D was called; and mother and baby were found to be stable and transported to L&D for delivery of placenta.    This pregnancy has been complicated by   Patient Active Problem List   Diagnosis    Chronic cough    Eczema    Allergic rhinitis with postnasal drip    Carpal tunnel syndrome, bilateral    Pregnancy    Gestational diabetes mellitus (GDM) in third trimester    HSV infection        Presentation: cepahlic  Estimated Fetal Weight: EFW per MFM ultrasound 23 7lbs 1 oz    Birth Center Risk Assessment: 1-management on labor and Delivery    0- CNM management in ABC  1- CNM management on L&D  2- Consultation with OB to develop  plan of care  3- Collaborative CNM/OB management with delivery on L&D   4- Permanent referral of care to MD            * No surgery found *     Hospital Course:   0340 pt presented to [facility] with painful contractions  0350 pt consented to SVE and was found to be 8/90/-1  0356 pt delivered live female infant precipitously in RAD[facility] with CNM at bedside  0405 pt transferred to L&D    2023 PPD 1 -- doing well, normal involution, normal lochia, pain controlled with  PO meds. Breastfeeding going well. Desires discharge home today.            Final Active Diagnoses:    Diagnosis Date Noted POA    PRINCIPAL PROBLEM:  Precipitous delivery, delivered (current hospitalization) [O62.3] 07/04/2023 No    Gestational diabetes mellitus (GDM) in third trimester [O24.419] 04/25/2023 Yes      Problems Resolved During this Admission:        Significant Diagnostic Studies: Labs:   CBC   Recent Labs   Lab 07/04/23  0417 07/05/23  0616   WBC 12.98* 10.15   HGB 13.4 12.5   HCT 40.5 37.5    245         Feeding Method: breast    Immunizations     Date Immunization Status Dose Route/Site Given by    07/04/23 0631 MMR Incomplete 0.5 mL Subcutaneous/     07/04/23 0631 Tdap Incomplete 0.5 mL Intramuscular/           Delivery:    Episiotomy: None   Lacerations: 1st   Repair suture:     Repair # of packets: 1   Blood loss (ml):       Birth information:  YOB: 2024   Time of birth: 3:56 AM   Sex: female   Delivery type: Vaginal, Spontaneous   Gestational Age: 40w1d    Delivery Clinician:      Other providers:       Additional  information:  Forceps:    Vacuum:    Breech:    Observed anomalies      Living?:           APGARS  One minute Five minutes Ten minutes   Skin color:         Heart rate:         Grimace:         Muscle tone:         Breathing:         Totals: 8  9        Placenta: Delivered:       appearance    Pending Diagnostic Studies:     Procedure Component Value Units Date/Time    RPR [[id number]] Collected: 07/04/23 0650    Order Status: Sent Lab Status: In process Updated: 07/04/23 0705    Specimen: Blood           Discharged Condition: stable    Disposition: Home or Self Care    Follow Up:   Follow-up Information     [facility] Schedule an appointment as soon as possible for a visit in 6 week(s).    Specialty: Obstetrics and Gynecology  Contact information:  [address] [place] [address] [place] [address] [phone]  Additional  information:  [facility] ([facility]) 4th Floor   Please park in [place] and use [facility] elevators                     Patient Instructions:      Glucose, fasting   Standing Status: Future Standing Exp. Date: 09/01/24   Order Comments: Schedule 6 weeks after delivery     Glucose Tolerance 2 Hour   Standing Status: Future Standing Exp. Date: 09/01/24   Order Comments: Schedule 6 weeks after delivery     Diet Adult Regular     Pelvic Rest     Notify your health care provider if you experience any of the following:  temperature >100.4     Notify your health care provider if you experience any of the following:  persistent nausea and vomiting or diarrhea     Notify your health care provider if you experience any of the following:  severe uncontrolled pain     Notify your health care provider if you experience any of the following:  redness, tenderness, or signs of infection (pain, swelling, redness, odor or green/yellow discharge around incision site)     Notify your health care provider if you experience any of the following:  difficulty breathing or increased cough     Notify your health care provider if you experience any of the following:  severe persistent headache     Notify your health care provider if you experience any of the following:  worsening rash     Notify your health care provider if you experience any of the following:  persistent dizziness, light-headedness, or visual disturbances     Notify your health care provider if you experience any of the following:  increased confusion or weakness     Notify your health care provider if you experience any of the following:     Activity as tolerated     Medications:  Current Discharge Medication List      START taking these medications    Details   ibuprofen (ADVIL,MOTRIN) 600 MG tablet Take 1 tablet (600 mg total) by mouth every 6 (six) hours as needed (cramping pain).  Qty: 30 tablet, Refills: 0         CONTINUE these  "medications which have NOT CHANGED    Details   prenatal 25/iron fum/folic/dha (PRENATAL-1 ORAL)          STOP taking these medications       acyclovir (ZOVIRAX) 400 MG tablet Comments:   Reason for Stopping:         blood sugar diagnostic Strp Comments:   Reason for Stopping:         blood-glucose meter Misc Comments:   Reason for Stopping:         insulin NPH (NOVOLIN N NPH U-100 INSULIN) 100 unit/mL injection Comments:   Reason for Stopping:         insulin syringe-needle U-100 0.5 mL 31 gauge x 5/16" Syrg Comments:   Reason for Stopping:         lancets 32 gauge Misc Comments:   Reason for Stopping:         multivitamin capsule Comments:   Reason for Stopping:         omega-3 fatty acids/fish oil (FISH OIL-OMEGA-3 FATTY ACIDS) 300-1,000 mg capsule Comments:   Reason for Stopping:               Luba Galvez CNM  Obstetrics  [facility] ([facility])  "

## 2023-07-05 NOTE — LACTATION NOTE
07/05/23 1215   Maternal Assessment   Breast Shape Right:;pendulous   Breast Density soft   Areola elastic   Nipples everted   Left Nipple Symptoms tender;cracked   Right Nipple Symptoms tender   Maternal Infant Feeding   Maternal Emotional State assist needed   Infant Positioning clutch/football   Signs of Milk Transfer audible swallow;infant jaw motion present   Latch Assistance yes     Baby demonstrating feeding cues. LC discussed shifting position to obtain deeper latch. Breast compression with stimulation utilized to keep baby active at the breast. Lactation discharge education completed. Plan of care is for pt to follow basic breastfeeding education, frequent feeding based on baby's cues, and to monitor baby's voids and stools. Breastfeeding guide, including First Alert survey, resource list, and lactation warmline phone number reviewed. LC encouraged Pt to pump two to three times a day after feeding for extra stimulation.  provided Pt information to obtain breast pump through insurance. Pt to notify doctor for maternal or infant concerns, as reviewed with LC. Pt verbalizes understanding and questions answered.

## 2023-07-06 ENCOUNTER — PATIENT MESSAGE (OUTPATIENT)
Dept: OBSTETRICS AND GYNECOLOGY | Facility: OTHER | Age: 33
End: 2023-07-06
Payer: COMMERCIAL

## 2023-07-20 ENCOUNTER — PATIENT MESSAGE (OUTPATIENT)
Dept: OBSTETRICS AND GYNECOLOGY | Facility: CLINIC | Age: 33
End: 2023-07-20
Payer: COMMERCIAL

## 2023-08-18 ENCOUNTER — POSTPARTUM VISIT (OUTPATIENT)
Dept: OBSTETRICS AND GYNECOLOGY | Facility: CLINIC | Age: 33
End: 2023-08-18
Payer: COMMERCIAL

## 2023-08-18 VITALS
HEIGHT: 68 IN | DIASTOLIC BLOOD PRESSURE: 64 MMHG | WEIGHT: 164.88 LBS | BODY MASS INDEX: 24.99 KG/M2 | SYSTOLIC BLOOD PRESSURE: 102 MMHG

## 2023-08-18 DIAGNOSIS — Z87.59 HISTORY OF PRECIPITOUS DELIVERY: ICD-10-CM

## 2023-08-18 DIAGNOSIS — Z86.32 HISTORY OF GESTATIONAL DIABETES: Primary | ICD-10-CM

## 2023-08-18 DIAGNOSIS — L42 PITYRIASIS ROSEA: ICD-10-CM

## 2023-08-18 PROBLEM — Z34.90 PREGNANCY: Status: RESOLVED | Noted: 2023-03-08 | Resolved: 2023-08-18

## 2023-08-18 PROBLEM — O24.419 GESTATIONAL DIABETES MELLITUS (GDM) IN THIRD TRIMESTER: Status: RESOLVED | Noted: 2023-04-25 | Resolved: 2023-08-18

## 2023-08-18 PROCEDURE — 0503F POSTPARTUM CARE VISIT: CPT | Mod: CPTII,S$GLB,, | Performed by: ADVANCED PRACTICE MIDWIFE

## 2023-08-18 PROCEDURE — 0503F PR POSTPARTUM CARE VISIT: ICD-10-PCS | Mod: CPTII,S$GLB,, | Performed by: ADVANCED PRACTICE MIDWIFE

## 2023-08-18 PROCEDURE — 99999 PR PBB SHADOW E&M-EST. PATIENT-LVL III: CPT | Mod: PBBFAC,,, | Performed by: ADVANCED PRACTICE MIDWIFE

## 2023-08-18 PROCEDURE — 99999 PR PBB SHADOW E&M-EST. PATIENT-LVL III: ICD-10-PCS | Mod: PBBFAC,,, | Performed by: ADVANCED PRACTICE MIDWIFE

## 2023-08-18 NOTE — PROGRESS NOTES
"Subjective:       Keara Wood is a 32 y.o. female who presents for a postpartum visit. She is 6 weeks postpartum following a spontaneous vaginal delivery. I have fully reviewed the prenatal and intrapartum course. The delivery was at 40 gestational weeks. Outcome: spontaneous vaginal delivery. Anesthesia: none. Postpartum course has been uncomplicated. Baby's course has been uncomplicated - somewhat colicky. Baby is feeding by breast and expressed breast milk via bottle. Pt reports oversupply of breast milk. Bleeding no bleeding. Stopped around week 2.  Bowel function is normal. Bladder function is normal. Patient is not sexually active. Contraception method is planning for partner vasectomy. Postpartum depression screening: negative. Reports itchy rash on back since 2 weeks PP.    The following portions of the patient's history were reviewed and updated as appropriate: allergies, current medications, past family history, past medical history, past social history, past surgical history, and problem list.    Review of Systems  Pertinent items are noted in HPI.     Objective:      /64   Ht 5' 7.99" (1.727 m)   Wt 74.8 kg (164 lb 14.5 oz)   LMP 09/15/2022 (Approximate)   Breastfeeding No   BMI 25.08 kg/m²    General:  alert, appears stated age, and cooperative    Breasts:  inspection negative, no nipple discharge or bleeding, no masses or nodularity palpable   Lungs: clear to auscultation bilaterally   Heart:  regular rate and rhythm, S1, S2 normal, no murmur, click, rub or gallop   Abdomen: soft, non-tender; bowel sounds normal; no masses,  no organomegaly    Vulva:  normal   Vagina: normal vagina   Cervix:  multiparous appearance   Corpus: normal size, contour, position, consistency, mobility, non-tender   Adnexa:  normal adnexa   Rectal Exam: Not performed.        Back with patchy macules consistent with pityriasis rosea  EPDS 5 - denies SI/HI; see scanned media for details  Assessment:      Normal " postpartum exam. Pap smear not indicated at today's visit.     Plan:      1. Contraception:  [place]; planning partner vasectomy  2. Continue to encourage breastfeeding  3. Follow up for 2 hr GTT ASAP  4. Reviewed Vit D supplementation, UV therapy possibly helpful for pytriasis; usually self-resolving; if no relief will consult derm  5. 1 year annual or PRN

## 2023-08-23 DIAGNOSIS — M25.531 RIGHT WRIST PAIN: Primary | ICD-10-CM

## 2023-08-24 ENCOUNTER — OFFICE VISIT (OUTPATIENT)
Dept: ORTHOPEDICS | Facility: CLINIC | Age: 33
End: 2023-08-24
Payer: COMMERCIAL

## 2023-08-24 ENCOUNTER — HOSPITAL ENCOUNTER (OUTPATIENT)
Dept: RADIOLOGY | Facility: HOSPITAL | Age: 33
Discharge: HOME | End: 2023-08-24
Attending: ORTHOPAEDIC SURGERY
Payer: COMMERCIAL

## 2023-08-24 VITALS — WEIGHT: 164.88 LBS | HEIGHT: 68 IN | BODY MASS INDEX: 24.99 KG/M2

## 2023-08-24 DIAGNOSIS — M25.531 RIGHT WRIST PAIN: ICD-10-CM

## 2023-08-24 DIAGNOSIS — M65.4 RADIAL STYLOID TENOSYNOVITIS (DE QUERVAIN): Primary | ICD-10-CM

## 2023-08-24 PROCEDURE — 1159F MED LIST DOCD IN RCRD: CPT | Mod: CPTII,S$GLB,, | Performed by: ORTHOPAEDIC SURGERY

## 2023-08-24 PROCEDURE — 73110 X-RAY EXAM OF WRIST: CPT | Mod: TC,PO,RT

## 2023-08-24 PROCEDURE — 73110 X-RAY EXAM OF WRIST: CPT | Mod: 26,RT,, | Performed by: RADIOLOGY

## 2023-08-24 PROCEDURE — 99214 PR OFFICE/OUTPT VISIT, EST, LEVL IV, 30-39 MIN: ICD-10-PCS | Mod: S$GLB,,, | Performed by: ORTHOPAEDIC SURGERY

## 2023-08-24 PROCEDURE — 3008F BODY MASS INDEX DOCD: CPT | Mod: CPTII,S$GLB,, | Performed by: ORTHOPAEDIC SURGERY

## 2023-08-24 PROCEDURE — 1160F RVW MEDS BY RX/DR IN RCRD: CPT | Mod: CPTII,S$GLB,, | Performed by: ORTHOPAEDIC SURGERY

## 2023-08-24 PROCEDURE — 3008F PR BODY MASS INDEX (BMI) DOCUMENTED: ICD-10-PCS | Mod: CPTII,S$GLB,, | Performed by: ORTHOPAEDIC SURGERY

## 2023-08-24 PROCEDURE — 99999 PR PBB SHADOW E&M-EST. PATIENT-LVL III: ICD-10-PCS | Mod: PBBFAC,,, | Performed by: ORTHOPAEDIC SURGERY

## 2023-08-24 PROCEDURE — 99214 OFFICE O/P EST MOD 30 MIN: CPT | Mod: S$GLB,,, | Performed by: ORTHOPAEDIC SURGERY

## 2023-08-24 PROCEDURE — 1160F PR REVIEW ALL MEDS BY PRESCRIBER/CLIN PHARMACIST DOCUMENTED: ICD-10-PCS | Mod: CPTII,S$GLB,, | Performed by: ORTHOPAEDIC SURGERY

## 2023-08-24 PROCEDURE — 99999 PR PBB SHADOW E&M-EST. PATIENT-LVL III: CPT | Mod: PBBFAC,,, | Performed by: ORTHOPAEDIC SURGERY

## 2023-08-24 PROCEDURE — 73110 XR WRIST COMPLETE 3 VIEWS RIGHT: ICD-10-PCS | Mod: 26,RT,, | Performed by: RADIOLOGY

## 2023-08-24 PROCEDURE — 1159F PR MEDICATION LIST DOCUMENTED IN MEDICAL RECORD: ICD-10-PCS | Mod: CPTII,S$GLB,, | Performed by: ORTHOPAEDIC SURGERY

## 2023-08-24 NOTE — PROGRESS NOTES
"CC:  32-year-old female presents for evaluation of right wrist pain.  The patient has had an insidious onset of pain on the radial aspect of her right wrist over the last few weeks.  She has problems gripping and grasping objects secondary to the pain.  She rates the pain as a 4/10.    Past Medical History:   Diagnosis Date    Allergic rhinitis with postnasal drip 05/27/2019    Anxiety     Chronic cough 04/12/2019    Eczema 04/12/2019    Gestational diabetes mellitus (GDM) in third trimester 4/25/2023    [x]32w growth - "Fetal size is AGA with the EFW at the 29% and the AC at the 31%. The EFW is 1710 g." [x]MFM consult [x]Dietician consult [x]~36w growth - "Fetal size is AGA with the EFW at the 34% and the AC at the 68%. The EFW is 2634 g." 5/30  Delivery timing recommendations: Well controlled with diet: 39 0/7 - 40 6/7 weeks gestation    Precipitous delivery, delivered (current hospitalization) 7/4/2023    Pregnancy 3/8/2023    Connected Mom: ordered/enrolled (not synching at 23w visit) Prepregnancy BMI: 27  Pap: 02/2022 - NILM; HR HPV negative Dating - per 7w sono U/S - no anomalies/Incomplete [x]Repeat MFM sono - Normal anatomy Aneuploidy screening - Blood type: A POS.  GDM screen - GDM, declines insulin Vaccines -  [ ]Flu - declines [ ]Covid-19 -declines [ ]TDAP - declines Contraception -  Peds - Peds hospitalist [facility]       Past Surgical History:   Procedure Laterality Date    CARPAL TUNNEL RELEASE Left 02/02/2022    Procedure: RELEASE, CARPAL TUNNEL;  Surgeon: Bipin Newman II, MD;  Location: [facility];  Service: Orthopedics;  Laterality: Left;    CHOLECYSTECTOMY      gallblader removal      around age 19    LIPOSUCTION OF ABDOMEN      2017       Current Outpatient Medications on File Prior to Visit   Medication Sig Dispense Refill    ibuprofen (ADVIL,MOTRIN) 600 MG tablet Take 1 tablet (600 mg total) by mouth every 6 (six) hours as needed (cramping pain). 30 tablet 0    prenatal 25/iron fum/folic/dha " (PRENATAL-1 ORAL)        No current facility-administered medications on file prior to visit.       ROS:    Constitution: Denies chills, fever, and sweats.  HENT: Denies headaches or blurry vision.  Cardiovascular: Denies chest pain or irregular heart beat.  Respiratory: Denies cough or shortness of breath.  Gastrointestinal: Denies abdominal pain, nausea, or vomiting.  Genitourinary:  Denies urinary incontinence, bladder and kidney issues  Musculoskeletal:  Denies muscle cramps.  Neurological: Denies dizziness or focal weakness.  Psychiatric/Behavioral: Normal mental status.  Hematologic/Lymphatic: Denies bleeding problem or easy bruising/bleeding.  Skin: Denies rash or suspicious lesions.    Physical examination     Gen - No acute distress, well nourished, well groomed   Eyes - Extraoccular motions intact, pupils equally round and reactive to light and accommodation   ENT - normocephalic, atruamtic, oropharynx clear   Neck - Supple, no abnormal masses   Cardiovascular - regular rate and rhythm   Pulmonary - clear to auscultation bilaterally, no wheezes, ronchi, or rales   Abdomen - soft, non-tender, non-distended, positive bowel sounds   Psych - The patient is alert and oriented x3 with normal mood and affect    Right Upper Extremity Examination     Skin is intact throughout   Motor is intact distally radial, median, ulnar, AIN, PIN   +2 radial and ulnar pulses   Sensation to light touch is intact distally radial, median, and ulnar   Full ROM at the DIP, PIP, and MCP joints   Wrist shows full ROM   No tenderness to palpation noted     Carpal Tunnel compression test - negative   Phalen's Test - negative  Tinel's Test - negative  Finkelstien's Test - positive    No Ecchymosis noted   No Swelling noted     Triggering of fingers or thumb - negative    X-ray images were examined and personally interpreted by me.  Three views the right wrist dated 08/24/2023 show well-maintained joint spaces with no advanced arthritic  changes and no acute fractures.    Dx:  De Quervain tenosynovitis of the right wrist    Plan:  I did offer the patient an injection and she agreed.  We injected the right 1st dorsal compartment with a mixture of 1 and 1.  She tolerated that well.  Follow up p.r.n..

## 2023-11-03 ENCOUNTER — PATIENT MESSAGE (OUTPATIENT)
Dept: OBSTETRICS AND GYNECOLOGY | Facility: CLINIC | Age: 33
End: 2023-11-03
Payer: COMMERCIAL

## 2023-11-04 DIAGNOSIS — A60.00 GENITAL HERPES SIMPLEX, UNSPECIFIED SITE: Primary | ICD-10-CM

## 2023-11-04 RX ORDER — VALACYCLOVIR HYDROCHLORIDE 500 MG/1
500 TABLET, FILM COATED ORAL 2 TIMES DAILY
Qty: 30 TABLET | Refills: 1 | Status: SHIPPED | OUTPATIENT
Start: 2023-11-04 | End: 2024-01-29 | Stop reason: SDUPTHER

## 2024-01-29 DIAGNOSIS — A60.00 GENITAL HERPES SIMPLEX, UNSPECIFIED SITE: ICD-10-CM

## 2024-01-30 RX ORDER — VALACYCLOVIR HYDROCHLORIDE 500 MG/1
500 TABLET, FILM COATED ORAL 2 TIMES DAILY
Qty: 30 TABLET | Refills: 1 | Status: SHIPPED | OUTPATIENT
Start: 2024-01-30 | End: 2024-02-02

## (undated) DEVICE — UNDERGLOVES BIOGEL PI SZ 7 LF

## (undated) DEVICE — GLOVE SURG ULTRA TOUCH 7

## (undated) DEVICE — SUT ETHILON 3-0 PS2 18 BLK

## (undated) DEVICE — TOURNIQUET SB QC DP 18X4IN

## (undated) DEVICE — SEE MEDLINE ITEM 157131

## (undated) DEVICE — GLOVE SURG ULTRA TOUCH 8.5

## (undated) DEVICE — BLADE SURG #15 CARBON STEEL

## (undated) DEVICE — SYR 10CC LUER LOCK

## (undated) DEVICE — SEE MEDLINE ITEM 157117

## (undated) DEVICE — DRAPE STERI-DRAPE 1000 17X11IN

## (undated) DEVICE — SOL 9P NACL IRR PIC IL

## (undated) DEVICE — ALCOHOL 70% ISOP RUBBING 4OZ

## (undated) DEVICE — NDL SAFETY 25G X 1.5 ECLIPSE

## (undated) DEVICE — DRAPE PLASTIC U 60X72

## (undated) DEVICE — UNDERGLOVES BIOGEL PI SIZE 8.5

## (undated) DEVICE — PACK SET UP 190 OMC-NS

## (undated) DEVICE — SLEEVE SCD EXPRESS KNEE MEDIUM

## (undated) DEVICE — SEE MEDLINE ITEM 157118

## (undated) DEVICE — STRAP OR TABLE 5IN X 72IN

## (undated) DEVICE — ELECTRODE REM PLYHSV RETURN 9

## (undated) DEVICE — GLOVE SURG ULTRA TOUCH 8

## (undated) DEVICE — APPLICATOR CHLORAPREP ORN 26ML

## (undated) DEVICE — DRESSING XEROFORM FOIL PK 1X8

## (undated) DEVICE — PAD CAST SPECIALIST STRL 3

## (undated) DEVICE — SEE MEDLINE ITEM 157173

## (undated) DEVICE — COVER SURG LIGHT HANDLE

## (undated) DEVICE — BANDAGE MATRIX HK LOOP 3IN 5YD

## (undated) DEVICE — PACK BASIC

## (undated) DEVICE — SPONGE BULKEE II ABSRB 6X6.75

## (undated) DEVICE — BANDAGE ESMARK ELASTIC ST 4X9

## (undated) DEVICE — TOWEL OR DISP STRL BLUE 4/PK